# Patient Record
Sex: FEMALE | Race: BLACK OR AFRICAN AMERICAN | Employment: OTHER | URBAN - METROPOLITAN AREA
[De-identification: names, ages, dates, MRNs, and addresses within clinical notes are randomized per-mention and may not be internally consistent; named-entity substitution may affect disease eponyms.]

---

## 2019-03-13 ENCOUNTER — HOSPITAL ENCOUNTER (OUTPATIENT)
Age: 39
Discharge: HOME | End: 2019-03-15
Payer: COMMERCIAL

## 2019-03-13 ENCOUNTER — OFFICE VISIT (OUTPATIENT)
Dept: FAMILY MEDICINE CLINIC | Age: 39
End: 2019-03-13
Payer: COMMERCIAL

## 2019-03-13 VITALS
HEIGHT: 67 IN | HEART RATE: 74 BPM | BODY MASS INDEX: 24.48 KG/M2 | RESPIRATION RATE: 18 BRPM | TEMPERATURE: 97.8 F | SYSTOLIC BLOOD PRESSURE: 114 MMHG | DIASTOLIC BLOOD PRESSURE: 76 MMHG | OXYGEN SATURATION: 100 % | WEIGHT: 156 LBS

## 2019-03-13 DIAGNOSIS — H61.23 CERUMEN DEBRIS ON TYMPANIC MEMBRANE, BILATERAL: ICD-10-CM

## 2019-03-13 DIAGNOSIS — R53.83 FATIGUE, UNSPECIFIED TYPE: ICD-10-CM

## 2019-03-13 DIAGNOSIS — Z23 NEEDS FLU SHOT: ICD-10-CM

## 2019-03-13 DIAGNOSIS — M54.10 RADICULOPATHY WITH LOWER EXTREMITY SYMPTOMS: ICD-10-CM

## 2019-03-13 DIAGNOSIS — M54.10 RADICULOPATHY WITH LOWER EXTREMITY SYMPTOMS: Primary | ICD-10-CM

## 2019-03-13 PROCEDURE — 84443 ASSAY THYROID STIM HORMONE: CPT

## 2019-03-13 PROCEDURE — 90686 IIV4 VACC NO PRSV 0.5 ML IM: CPT

## 2019-03-13 PROCEDURE — 36415 COLL VENOUS BLD VENIPUNCTURE: CPT | Performed by: FAMILY MEDICINE

## 2019-03-13 PROCEDURE — G8427 DOCREV CUR MEDS BY ELIG CLIN: HCPCS | Performed by: FAMILY MEDICINE

## 2019-03-13 PROCEDURE — G8420 CALC BMI NORM PARAMETERS: HCPCS | Performed by: FAMILY MEDICINE

## 2019-03-13 PROCEDURE — 80048 BASIC METABOLIC PNL TOTAL CA: CPT

## 2019-03-13 PROCEDURE — G0008 ADMIN INFLUENZA VIRUS VAC: HCPCS

## 2019-03-13 PROCEDURE — 82607 VITAMIN B-12: CPT

## 2019-03-13 PROCEDURE — 6360000002 HC RX W HCPCS

## 2019-03-13 PROCEDURE — 1036F TOBACCO NON-USER: CPT | Performed by: FAMILY MEDICINE

## 2019-03-13 PROCEDURE — 82746 ASSAY OF FOLIC ACID SERUM: CPT

## 2019-03-13 PROCEDURE — 99212 OFFICE O/P EST SF 10 MIN: CPT | Performed by: STUDENT IN AN ORGANIZED HEALTH CARE EDUCATION/TRAINING PROGRAM

## 2019-03-13 PROCEDURE — 99213 OFFICE O/P EST LOW 20 MIN: CPT | Performed by: STUDENT IN AN ORGANIZED HEALTH CARE EDUCATION/TRAINING PROGRAM

## 2019-03-13 PROCEDURE — G8482 FLU IMMUNIZE ORDER/ADMIN: HCPCS | Performed by: FAMILY MEDICINE

## 2019-03-13 ASSESSMENT — PATIENT HEALTH QUESTIONNAIRE - PHQ9
SUM OF ALL RESPONSES TO PHQ QUESTIONS 1-9: 2
SUM OF ALL RESPONSES TO PHQ9 QUESTIONS 1 & 2: 2
2. FEELING DOWN, DEPRESSED OR HOPELESS: 1
SUM OF ALL RESPONSES TO PHQ QUESTIONS 1-9: 2
1. LITTLE INTEREST OR PLEASURE IN DOING THINGS: 1

## 2019-03-14 LAB
ANION GAP SERPL CALCULATED.3IONS-SCNC: 13 MMOL/L (ref 7–16)
BUN BLDV-MCNC: 9 MG/DL (ref 6–20)
CALCIUM SERPL-MCNC: 9.3 MG/DL (ref 8.6–10.2)
CHLORIDE BLD-SCNC: 102 MMOL/L (ref 98–107)
CO2: 26 MMOL/L (ref 22–29)
CREAT SERPL-MCNC: 0.8 MG/DL (ref 0.5–1)
FOLATE: 17.1 NG/ML (ref 4.8–24.2)
GFR AFRICAN AMERICAN: >60
GFR NON-AFRICAN AMERICAN: >60 ML/MIN/1.73
GLUCOSE BLD-MCNC: 77 MG/DL (ref 74–99)
POTASSIUM SERPL-SCNC: 4.8 MMOL/L (ref 3.5–5)
SODIUM BLD-SCNC: 141 MMOL/L (ref 132–146)
TSH SERPL DL<=0.05 MIU/L-ACNC: 1.26 UIU/ML (ref 0.27–4.2)
VITAMIN B-12: 399 PG/ML (ref 211–946)

## 2019-03-14 PROCEDURE — G0008 ADMIN INFLUENZA VIRUS VAC: HCPCS

## 2019-03-17 ENCOUNTER — HOSPITAL ENCOUNTER (OUTPATIENT)
Age: 39
Discharge: HOME | End: 2019-03-19
Payer: COMMERCIAL

## 2019-03-17 ENCOUNTER — HOSPITAL ENCOUNTER (OUTPATIENT)
Dept: GENERAL RADIOLOGY | Age: 39
Discharge: HOME | End: 2019-03-19
Payer: COMMERCIAL

## 2019-03-17 DIAGNOSIS — M54.10 RADICULOPATHY WITH LOWER EXTREMITY SYMPTOMS: ICD-10-CM

## 2019-03-17 PROCEDURE — 72100 X-RAY EXAM L-S SPINE 2/3 VWS: CPT

## 2019-03-21 ASSESSMENT — ENCOUNTER SYMPTOMS
BACK PAIN: 1
NAUSEA: 0
CONSTIPATION: 0
ABDOMINAL PAIN: 0

## 2019-03-22 ENCOUNTER — OFFICE VISIT (OUTPATIENT)
Dept: FAMILY MEDICINE CLINIC | Age: 39
End: 2019-03-22
Payer: COMMERCIAL

## 2019-03-22 ENCOUNTER — HOSPITAL ENCOUNTER (OUTPATIENT)
Age: 39
Discharge: HOME | End: 2019-03-24
Payer: COMMERCIAL

## 2019-03-22 VITALS
TEMPERATURE: 98 F | OXYGEN SATURATION: 100 % | BODY MASS INDEX: 24.34 KG/M2 | DIASTOLIC BLOOD PRESSURE: 70 MMHG | HEART RATE: 86 BPM | SYSTOLIC BLOOD PRESSURE: 104 MMHG | WEIGHT: 155.1 LBS | HEIGHT: 67 IN

## 2019-03-22 DIAGNOSIS — R20.0 SADDLE ANESTHESIA: ICD-10-CM

## 2019-03-22 DIAGNOSIS — G62.9 NEUROPATHY: Primary | ICD-10-CM

## 2019-03-22 DIAGNOSIS — H53.2 DOUBLE VISION: ICD-10-CM

## 2019-03-22 DIAGNOSIS — G62.9 NEUROPATHY: ICD-10-CM

## 2019-03-22 LAB
BASOPHILS ABSOLUTE: 0.07 E9/L (ref 0–0.2)
BASOPHILS RELATIVE PERCENT: 0.6 % (ref 0–2)
EOSINOPHILS ABSOLUTE: 0.09 E9/L (ref 0.05–0.5)
EOSINOPHILS RELATIVE PERCENT: 0.7 % (ref 0–6)
HCT VFR BLD CALC: 43.3 % (ref 34–48)
HEMOGLOBIN: 14.1 G/DL (ref 11.5–15.5)
IMMATURE GRANULOCYTES #: 0.03 E9/L
IMMATURE GRANULOCYTES %: 0.2 % (ref 0–5)
LYMPHOCYTES ABSOLUTE: 1.07 E9/L (ref 1.5–4)
LYMPHOCYTES RELATIVE PERCENT: 8.9 % (ref 20–42)
MCH RBC QN AUTO: 32.5 PG (ref 26–35)
MCHC RBC AUTO-ENTMCNC: 32.6 % (ref 32–34.5)
MCV RBC AUTO: 99.8 FL (ref 80–99.9)
MONOCYTES ABSOLUTE: 0.69 E9/L (ref 0.1–0.95)
MONOCYTES RELATIVE PERCENT: 5.7 % (ref 2–12)
NEUTROPHILS ABSOLUTE: 10.13 E9/L (ref 1.8–7.3)
NEUTROPHILS RELATIVE PERCENT: 83.9 % (ref 43–80)
PDW BLD-RTO: 12.8 FL (ref 11.5–15)
PLATELET # BLD: 407 E9/L (ref 130–450)
PMV BLD AUTO: 9.4 FL (ref 7–12)
RBC # BLD: 4.34 E12/L (ref 3.5–5.5)
WBC # BLD: 12.1 E9/L (ref 4.5–11.5)

## 2019-03-22 PROCEDURE — G8420 CALC BMI NORM PARAMETERS: HCPCS | Performed by: STUDENT IN AN ORGANIZED HEALTH CARE EDUCATION/TRAINING PROGRAM

## 2019-03-22 PROCEDURE — G8482 FLU IMMUNIZE ORDER/ADMIN: HCPCS | Performed by: STUDENT IN AN ORGANIZED HEALTH CARE EDUCATION/TRAINING PROGRAM

## 2019-03-22 PROCEDURE — 99212 OFFICE O/P EST SF 10 MIN: CPT | Performed by: STUDENT IN AN ORGANIZED HEALTH CARE EDUCATION/TRAINING PROGRAM

## 2019-03-22 PROCEDURE — G8427 DOCREV CUR MEDS BY ELIG CLIN: HCPCS | Performed by: STUDENT IN AN ORGANIZED HEALTH CARE EDUCATION/TRAINING PROGRAM

## 2019-03-22 PROCEDURE — 1036F TOBACCO NON-USER: CPT | Performed by: STUDENT IN AN ORGANIZED HEALTH CARE EDUCATION/TRAINING PROGRAM

## 2019-03-22 PROCEDURE — 36415 COLL VENOUS BLD VENIPUNCTURE: CPT | Performed by: FAMILY MEDICINE

## 2019-03-22 PROCEDURE — 99213 OFFICE O/P EST LOW 20 MIN: CPT | Performed by: STUDENT IN AN ORGANIZED HEALTH CARE EDUCATION/TRAINING PROGRAM

## 2019-03-22 PROCEDURE — 85025 COMPLETE CBC W/AUTO DIFF WBC: CPT

## 2019-03-25 ASSESSMENT — ENCOUNTER SYMPTOMS
DIARRHEA: 0
EYE ITCHING: 0
VOMITING: 0
COUGH: 0
RHINORRHEA: 0
NAUSEA: 0
WHEEZING: 0
SHORTNESS OF BREATH: 0
EYE DISCHARGE: 0
CONSTIPATION: 0
ABDOMINAL PAIN: 0
ABDOMINAL DISTENTION: 0
COLOR CHANGE: 0
SORE THROAT: 0
BACK PAIN: 0
EYE REDNESS: 0

## 2019-03-26 ENCOUNTER — TELEPHONE (OUTPATIENT)
Dept: FAMILY MEDICINE CLINIC | Age: 39
End: 2019-03-26

## 2019-04-08 ENCOUNTER — TELEPHONE (OUTPATIENT)
Dept: FAMILY MEDICINE CLINIC | Age: 39
End: 2019-04-08

## 2019-04-08 DIAGNOSIS — H53.2 DOUBLE VISION: ICD-10-CM

## 2019-04-08 DIAGNOSIS — R20.0 SADDLE ANESTHESIA: ICD-10-CM

## 2019-04-08 DIAGNOSIS — G62.9 NEUROPATHY: Primary | ICD-10-CM

## 2019-04-08 NOTE — TELEPHONE ENCOUNTER
Pt has MRI brain tomorrow  It needs ordered as MRI brain with and without contrast  Can a new order be placed   The patient goes tomorrow

## 2019-04-09 ENCOUNTER — HOSPITAL ENCOUNTER (OUTPATIENT)
Dept: MRI IMAGING | Age: 39
Discharge: HOME | End: 2019-04-11
Payer: COMMERCIAL

## 2019-04-09 DIAGNOSIS — R20.0 SADDLE ANESTHESIA: ICD-10-CM

## 2019-04-09 DIAGNOSIS — H53.2 DOUBLE VISION: ICD-10-CM

## 2019-04-09 DIAGNOSIS — G62.9 NEUROPATHY: ICD-10-CM

## 2019-04-09 PROCEDURE — 72157 MRI CHEST SPINE W/O & W/DYE: CPT

## 2019-04-09 PROCEDURE — 70553 MRI BRAIN STEM W/O & W/DYE: CPT

## 2019-04-09 PROCEDURE — 72158 MRI LUMBAR SPINE W/O & W/DYE: CPT

## 2019-04-09 PROCEDURE — A9579 GAD-BASE MR CONTRAST NOS,1ML: HCPCS | Performed by: RADIOLOGY

## 2019-04-09 PROCEDURE — 72156 MRI NECK SPINE W/O & W/DYE: CPT

## 2019-04-09 PROCEDURE — 6360000004 HC RX CONTRAST MEDICATION: Performed by: RADIOLOGY

## 2019-04-09 RX ADMIN — GADOTERIDOL 20 ML: 279.3 INJECTION, SOLUTION INTRAVENOUS at 09:32

## 2019-04-14 DIAGNOSIS — H53.2 DOUBLE VISION: ICD-10-CM

## 2019-04-14 DIAGNOSIS — R93.89 ABNORMAL MRI: Primary | ICD-10-CM

## 2019-04-14 DIAGNOSIS — M54.10 RADICULOPATHY WITH LOWER EXTREMITY SYMPTOMS: ICD-10-CM

## 2019-04-14 DIAGNOSIS — G62.9 NEUROPATHY: ICD-10-CM

## 2019-04-14 DIAGNOSIS — R20.0 SADDLE ANESTHESIA: ICD-10-CM

## 2019-04-14 NOTE — RESULT ENCOUNTER NOTE
Please call patient and let her know we are referring her to Neurology.      Electronically signed by Odalis Doan MD on 4/14/2019 at 8:56 AM

## 2019-04-23 ENCOUNTER — TELEPHONE (OUTPATIENT)
Dept: FAMILY MEDICINE CLINIC | Age: 39
End: 2019-04-23

## 2019-04-25 ENCOUNTER — TELEPHONE (OUTPATIENT)
Dept: FAMILY MEDICINE CLINIC | Age: 39
End: 2019-04-25

## 2019-04-26 ENCOUNTER — TELEPHONE (OUTPATIENT)
Dept: FAMILY MEDICINE CLINIC | Age: 39
End: 2019-04-26

## 2019-04-26 DIAGNOSIS — R93.89 ABNORMAL MRI: Primary | ICD-10-CM

## 2019-06-19 ENCOUNTER — HOSPITAL ENCOUNTER (OUTPATIENT)
Age: 39
Discharge: HOME | End: 2019-06-19
Payer: COMMERCIAL

## 2019-06-19 ENCOUNTER — OFFICE VISIT (OUTPATIENT)
Dept: NEUROLOGY | Age: 39
End: 2019-06-19
Payer: COMMERCIAL

## 2019-06-19 VITALS
HEIGHT: 67 IN | OXYGEN SATURATION: 99 % | RESPIRATION RATE: 17 BRPM | HEART RATE: 67 BPM | DIASTOLIC BLOOD PRESSURE: 64 MMHG | BODY MASS INDEX: 26.37 KG/M2 | SYSTOLIC BLOOD PRESSURE: 105 MMHG | WEIGHT: 168 LBS

## 2019-06-19 VITALS
SYSTOLIC BLOOD PRESSURE: 105 MMHG | BODY MASS INDEX: 26.37 KG/M2 | WEIGHT: 168 LBS | HEIGHT: 67 IN | DIASTOLIC BLOOD PRESSURE: 64 MMHG | HEART RATE: 67 BPM

## 2019-06-19 DIAGNOSIS — M21.372 BILATERAL FOOT-DROP: ICD-10-CM

## 2019-06-19 DIAGNOSIS — G35 MULTIPLE SCLEROSIS (HCC): ICD-10-CM

## 2019-06-19 DIAGNOSIS — R25.2 SPASTICITY: ICD-10-CM

## 2019-06-19 DIAGNOSIS — G35 MULTIPLE SCLEROSIS (HCC): Primary | ICD-10-CM

## 2019-06-19 DIAGNOSIS — M21.371 BILATERAL FOOT-DROP: ICD-10-CM

## 2019-06-19 LAB
ALBUMIN SERPL-MCNC: 4.7 G/DL (ref 3.5–5.2)
ALP BLD-CCNC: 48 U/L (ref 35–104)
ALT SERPL-CCNC: 12 U/L (ref 0–32)
ANION GAP SERPL CALCULATED.3IONS-SCNC: 14 MMOL/L (ref 7–16)
AST SERPL-CCNC: 17 U/L (ref 0–31)
BASOPHILS ABSOLUTE: 0.07 E9/L (ref 0–0.2)
BASOPHILS RELATIVE PERCENT: 0.9 % (ref 0–2)
BILIRUB SERPL-MCNC: 0.2 MG/DL (ref 0–1.2)
BUN BLDV-MCNC: 11 MG/DL (ref 6–20)
CALCIUM SERPL-MCNC: 9.3 MG/DL (ref 8.6–10.2)
CHLORIDE BLD-SCNC: 103 MMOL/L (ref 98–107)
CO2: 23 MMOL/L (ref 22–29)
CREAT SERPL-MCNC: 0.7 MG/DL (ref 0.5–1)
EOSINOPHILS ABSOLUTE: 0.13 E9/L (ref 0.05–0.5)
EOSINOPHILS RELATIVE PERCENT: 1.6 % (ref 0–6)
GFR AFRICAN AMERICAN: >60
GFR NON-AFRICAN AMERICAN: >60 ML/MIN/1.73
GLUCOSE BLD-MCNC: 90 MG/DL (ref 74–99)
HCT VFR BLD CALC: 40.2 % (ref 34–48)
HEMOGLOBIN: 13.3 G/DL (ref 11.5–15.5)
IMMATURE GRANULOCYTES #: 0.04 E9/L
IMMATURE GRANULOCYTES %: 0.5 % (ref 0–5)
LYMPHOCYTES ABSOLUTE: 1.8 E9/L (ref 1.5–4)
LYMPHOCYTES RELATIVE PERCENT: 22.3 % (ref 20–42)
MCH RBC QN AUTO: 32.7 PG (ref 26–35)
MCHC RBC AUTO-ENTMCNC: 33.1 % (ref 32–34.5)
MCV RBC AUTO: 98.8 FL (ref 80–99.9)
MONOCYTES ABSOLUTE: 0.74 E9/L (ref 0.1–0.95)
MONOCYTES RELATIVE PERCENT: 9.2 % (ref 2–12)
NEUTROPHILS ABSOLUTE: 5.3 E9/L (ref 1.8–7.3)
NEUTROPHILS RELATIVE PERCENT: 65.5 % (ref 43–80)
PDW BLD-RTO: 12.6 FL (ref 11.5–15)
PLATELET # BLD: 385 E9/L (ref 130–450)
PMV BLD AUTO: 9.2 FL (ref 7–12)
POTASSIUM SERPL-SCNC: 4.1 MMOL/L (ref 3.5–5)
RBC # BLD: 4.07 E12/L (ref 3.5–5.5)
SEDIMENTATION RATE, ERYTHROCYTE: 29 MM/HR (ref 0–20)
SODIUM BLD-SCNC: 140 MMOL/L (ref 132–146)
TOTAL PROTEIN: 7.7 G/DL (ref 6.4–8.3)
WBC # BLD: 8.1 E9/L (ref 4.5–11.5)

## 2019-06-19 PROCEDURE — 85651 RBC SED RATE NONAUTOMATED: CPT

## 2019-06-19 PROCEDURE — 36415 COLL VENOUS BLD VENIPUNCTURE: CPT

## 2019-06-19 PROCEDURE — 86038 ANTINUCLEAR ANTIBODIES: CPT

## 2019-06-19 PROCEDURE — G8419 CALC BMI OUT NRM PARAM NOF/U: HCPCS | Performed by: PHYSICAL MEDICINE & REHABILITATION

## 2019-06-19 PROCEDURE — 99205 OFFICE O/P NEW HI 60 MIN: CPT | Performed by: PSYCHIATRY & NEUROLOGY

## 2019-06-19 PROCEDURE — 99204 OFFICE O/P NEW MOD 45 MIN: CPT | Performed by: PHYSICAL MEDICINE & REHABILITATION

## 2019-06-19 PROCEDURE — G8427 DOCREV CUR MEDS BY ELIG CLIN: HCPCS | Performed by: PSYCHIATRY & NEUROLOGY

## 2019-06-19 PROCEDURE — 80074 ACUTE HEPATITIS PANEL: CPT

## 2019-06-19 PROCEDURE — 1036F TOBACCO NON-USER: CPT | Performed by: PHYSICAL MEDICINE & REHABILITATION

## 2019-06-19 PROCEDURE — 86703 HIV-1/HIV-2 1 RESULT ANTBDY: CPT

## 2019-06-19 PROCEDURE — G8419 CALC BMI OUT NRM PARAM NOF/U: HCPCS | Performed by: PSYCHIATRY & NEUROLOGY

## 2019-06-19 PROCEDURE — 85025 COMPLETE CBC W/AUTO DIFF WBC: CPT

## 2019-06-19 PROCEDURE — 80053 COMPREHEN METABOLIC PANEL: CPT

## 2019-06-19 PROCEDURE — 86592 SYPHILIS TEST NON-TREP QUAL: CPT

## 2019-06-19 PROCEDURE — G8427 DOCREV CUR MEDS BY ELIG CLIN: HCPCS | Performed by: PHYSICAL MEDICINE & REHABILITATION

## 2019-06-19 PROCEDURE — 99212 OFFICE O/P EST SF 10 MIN: CPT | Performed by: PSYCHIATRY & NEUROLOGY

## 2019-06-19 PROCEDURE — 86039 ANTINUCLEAR ANTIBODIES (ANA): CPT

## 2019-06-19 PROCEDURE — 86235 NUCLEAR ANTIGEN ANTIBODY: CPT

## 2019-06-19 RX ORDER — BACLOFEN 10 MG/1
TABLET ORAL
Qty: 60 TABLET | Refills: 3 | Status: SHIPPED | OUTPATIENT
Start: 2019-06-19 | End: 2019-10-16 | Stop reason: ALTCHOICE

## 2019-06-19 NOTE — PROGRESS NOTES
Diabetes Maternal Grandfather     Cancer Maternal Grandfather         lymphoma     Hypertension Brother     High Cholesterol Brother     Cancer Maternal Aunt         breast cancer diagnosed 43yo     No reported family history of MS    Social History:  Social History     Socioeconomic History    Marital status: Single     Spouse name: Not on file    Number of children: Not on file    Years of education: Not on file    Highest education level: Not on file   Occupational History    Occupation: unemployed   Social Needs    Financial resource strain: Not on file    Food insecurity:     Worry: Not on file     Inability: Not on file   Viadeo needs:     Medical: Not on file     Non-medical: Not on file   Tobacco Use    Smoking status: Never Smoker    Smokeless tobacco: Never Used   Substance and Sexual Activity    Alcohol use: No    Drug use: No    Sexual activity: Not Currently   Lifestyle    Physical activity:     Days per week: Not on file     Minutes per session: Not on file    Stress: Not on file   Relationships    Social connections:     Talks on phone: Not on file     Gets together: Not on file     Attends Anabaptist service: Not on file     Active member of club or organization: Not on file     Attends meetings of clubs or organizations: Not on file     Relationship status: Not on file    Intimate partner violence:     Fear of current or ex partner: Not on file     Emotionally abused: Not on file     Physically abused: Not on file     Forced sexual activity: Not on file   Other Topics Concern    Not on file   Social History Narrative    Not on file     Work history: Worked at [employer] until 2017, no longer working      FUNCTIONAL STATUS:   Patient is independent with mobility/ambulation, transfers, ADL's, IADL's.     Review of Systems:    Constitutional: Denies fevers, chills, night sweats, unintentional weight loss     Skin: Denies rash or skin changes     Eyes: Denies vision

## 2019-06-19 NOTE — PROGRESS NOTES
[facility]. Albino Waite M.D., F.A.C.P. Bentley Whitten, OLEG, APRN, CNS  Taylor Powers. Anastacio Heath, MSN, APRN-FNP-C  Benuel Angelucci MSN, APRN, FNP-C  Mariusz ADAN PA-C  [address] MSN, APRN, FNP-C  [address], [address] [place]   Phone: [phone]  Fax: [phone]      Ashley Blair is a 46 y.o. right handed woman who was referred for additional evaluation and management of recently diagnosed multiple sclerosis. The patient was a poor historian due to congenital cognitive deficits; her mother was a fair historian. Her medications included only Flonase. Her past medical history was not remarkable for hypertension, diabetes mellitus, strokes, seizures, heart disease, lung disorders, connective tissue disease, gastrointestinal issues, cancers, skin abnormalities or depression. She was reportedly treated for sexually transmitted diseases years ago---of uncertain etiology. Her mother also reported a right heel spur. Her only surgeries include a repair of the right heel spur. She had no known allergies and suffered no trauma. She is a native of the [facility]. She always lived with her mother. She was single with no children. She never smoked nor drank. She never abused illicit drugs. She previously worked in a sheltered setting. She attended special classes in high school. There were 4 siblings who are healthy. Her family history was remarkable for hypertension, hyperlipidemia, diabetes mellitus, lymphoma and breast cancer. .    Review of systems was otherwise unremarkable, except as noted below. She was referred to this clinic after recently being diagnosed with multiple sclerosis. Her mother noted she always walks slowly. However, she reported more difficulties with her gait over the past 6 months. Her legs felt numb 6 months ago and clumsier.   She also reported numbness around her buttocks and perineum. She denied any incontinence. The patient reported no involvement of her arms. She also denied more speech difficulties, headaches, visual problems, hearing loss, spinning sensations, swallowing or chewing abnormalities or loss of consciousness. She was the product of a normal pregnancy and delivery. However, she reached all her milestones late. She did not walk until she was 4years old. She began speaking afterwards. She was always slow and unable to keep up with her siblings and peers. She attended special classes throughout her life. There were never seizures. The patient and her mother denied other neurological or medical issues. Objective:     /64 (Site: Left Upper Arm, Position: Sitting, Cuff Size: Medium Adult)   Pulse 67   Resp 17   Ht 5' 7\" (1.702 m)   Wt 168 lb (76.2 kg)   SpO2 99%   BMI 26.31 kg/m²     General appearance: alert, appears stated age, cooperative and in no distress  Head: normocephalic--- with no macro or microcephaly, without obvious abnormality, atraumatic  Eyes: conjunctivae/corneas clear; no drainage  Neck: no adenopathy, no carotid bruit, supple, symmetrical, trachea midline, no tenderness/mass/nodules  Back: symmetric, no curvature.  ROM normal.   Lungs: clear to auscultation  Heart: regular rate and rhythm, S1, S2 normal, no murmur  Abdomen: soft, non-tender; bowel sounds normal; no masses,  no organomegaly  Extremities: normal, atraumatic, no cyanosis or edema  Pulses: 2+ and symmetric  Skin:  color, texture, turgor normal-----no rashes or lesions    Mental Status: alert and oriented x 3; she displayed fair insight into her disorder  She possessed a slight spastic, ataxic dysarthria  There were no aphasias but her speech was limited; with slow thinking    Cranial Nerves:  I: smell    II: visual acuity     II: visual fields Full to confrontation    II: pupils PERRL   III,VII: ptosis None   III,IV,VI: extraocular muscles  Left beating nystagmus with primary gaze   V: mastication Normal   V: facial light touch sensation  Normal   V,VII: corneal reflex     VII: facial muscle function - upper  Normal   VII: facial muscle function - lower Normal   VIII: hearing Normal   IX: soft palate elevation  Normal   IX,X: gag reflex    XI: trapezius strength  5/5   XI: sternocleidomastoid strength 5/5   XI: neck extension strength  5/5   XII: tongue strength  Normal     Motor:  5/5 in both arms  Moderate spasticity in both legs, with 5/5 strength as well  Normal bulk with no drift or abnormal movements. Sensory:   All sensory modalities were intact    Coordination:   Finger-to-nose testing was performed well; heel-to-shin was performed slowly but without lesions  Rapid alternating movements and fine finger movements are slightly decreased in both hands    Gait:  She displayed a spastic paraparetic gait with difficulties flexing her hip and with bilateral foot drop  Romberg's was unremarkable    DTR:   +2 in the arms and +3 in the legs  I found no pathological reflexes      Laboratory/Radiology:  ry/Radiology:     CBC:   Lab Results   Component Value Date    WBC 8.1 06/19/2019    RBC 4.07 06/19/2019    HGB 13.3 06/19/2019    HCT 40.2 06/19/2019    MCV 98.8 06/19/2019    MCH 32.7 06/19/2019    MCHC 33.1 06/19/2019    RDW 12.6 06/19/2019     06/19/2019    MPV 9.2 06/19/2019     CMP:    Lab Results   Component Value Date     03/13/2019    K 4.8 03/13/2019     03/13/2019    CO2 26 03/13/2019    BUN 9 03/13/2019    CREATININE 0.8 03/13/2019    GFRAA >60 03/13/2019    LABGLOM >60 03/13/2019    GLUCOSE 77 03/13/2019    CALCIUM 9.3 03/13/2019     TSH:    Lab Results   Component Value Date    TSH 1.260 03/13/2019   FOLATE:    Lab Results   Component Value Date    FOLATE 17.1 03/13/2019     MRI Brain with/without contrast 4/9/19 revealed moderate white matter disease in the periventricular regions and gray-white matter junctions, with no active lesions    MRI of her C-spine revealed abnormal signal in the upper cord without active disease    MRIs of her lumbosacral spine and thoracic spine displayed demyelinating lesions as well    Assessment: This individual presents with a spastic paraparetic gait--- 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 Fortunately, she has had no seizures. Unfortunately, her gait has worsened due to multiple sclerosis. She requires aggressive disease modifying therapy---either oral agents or Ocrevus. She also requires antispasmodics, gait evaluation and bilateral AFO bracing. She is stable medically. Plan:     Routine laboratory data as well as HIV, MARGE, sedimentation rate, RPR, HUGO virus assay and Sjogren's studies were ordered    Visual evoked potentials were also ordered    She and her mother will decide concerning therapies soon    Rehabilitation will follow closely    She will return in 3 to 4 months    I spent 80 minutes with the patient with over 50 % spent in counseling and disease management. All patient issues were addressed and all questions were answered.

## 2019-06-20 LAB
HAV IGM SER IA-ACNC: NORMAL
HEPATITIS B CORE IGM ANTIBODY: NORMAL
HEPATITIS B SURFACE ANTIGEN INTERPRETATION: NORMAL
HEPATITIS C ANTIBODY INTERPRETATION: NORMAL
HIV-1 AND HIV-2 ANTIBODIES: NORMAL
RPR: NORMAL

## 2019-06-21 LAB
ANA PATTERN: ABNORMAL
ANA TITER: ABNORMAL
ANTI-NUCLEAR ANTIBODY (ANA): POSITIVE
ENA TO SSA (RO) ANTIBODY: NEGATIVE
ENA TO SSB (LA) ANTIBODY: NEGATIVE

## 2019-06-25 ENCOUNTER — HOSPITAL ENCOUNTER (OUTPATIENT)
Dept: AUDIOLOGY | Age: 39
Discharge: HOME | End: 2019-06-25
Payer: COMMERCIAL

## 2019-06-25 PROCEDURE — 95930 VISUAL EP TEST CNS W/I&R: CPT | Performed by: PSYCHIATRY & NEUROLOGY

## 2019-06-25 PROCEDURE — 95930 VISUAL EP TEST CNS W/I&R: CPT | Performed by: AUDIOLOGIST

## 2019-07-09 DIAGNOSIS — G35 MULTIPLE SCLEROSIS (HCC): Primary | ICD-10-CM

## 2019-07-11 ENCOUNTER — HOSPITAL ENCOUNTER (OUTPATIENT)
Age: 39
Discharge: HOME | End: 2019-07-11
Payer: COMMERCIAL

## 2019-07-11 DIAGNOSIS — G35 MULTIPLE SCLEROSIS (HCC): ICD-10-CM

## 2019-07-11 PROCEDURE — 87798 DETECT AGENT NOS DNA AMP: CPT

## 2019-07-22 LAB
Lab: NORMAL
REPORT: NORMAL
THIS TEST SENT TO: NORMAL

## 2019-08-01 ENCOUNTER — TELEPHONE (OUTPATIENT)
Dept: NEUROLOGY | Age: 39
End: 2019-08-01

## 2019-08-15 NOTE — TELEPHONE ENCOUNTER
Pts mother, Fredy [address], called questioning/ confirming the Tecfidera dosing instructions she was given by Augie Mackay MA:  Starting Sunday 8/4: For 7 days take 1 120mg po BID with food  8/11: For 7 days Take 2  240mg po at dinner  8/18: For 7 days Take 1 120mg po with lunch  8/25: For 7 days Take 2  240mg po with dinner  Continuing forward Take 1 240mg BID    Tory was very frantic and upset, yelling that she was out of the 120mg tablets and asking if her daughter was going to die because she has been calling the office for 4 days and no one has returned her calls. She forgot to give the medication for 2 days and she doesn't know what to do now. She is very confused on the instructions given to her and is concerned her daughter is going to get sick. After calming patient's mother down. .  CMA read the instructions from the Tecfidera start form scanned into the  in [address] (There were no other order/instructions documented)  Week 1 For 7 days take 120mg po BID  Week 2-4 take 240mg po BID    CMA informed Fredy [address] that she would clarify the instructions with Dr Nicolas Mercado and call her back. Fredy [address] can be reached at [phone]. Message forwarded to Dr Nicolas Mercado and Augie Mackay MA for further instruction.     Electronically signed by Merari Tellez on 8/15/19 at 12:12 PM

## 2019-08-15 NOTE — TELEPHONE ENCOUNTER
Tecfidera Alternative start dosing was:     Week 1: 120 mg at dinner for 7 days  Week 2: 240 mg at dinner for 7 days  Week 3: 120 mg at at lunch and 240 mg at dinner for 7 days  Week 4: 240 mg at lunch and 240 mg at dinner for every day after    Patient's mother was given these instructions multiple times due to her being worried about patient starting tecfidera full on and getting sick.

## 2019-10-16 ENCOUNTER — OFFICE VISIT (OUTPATIENT)
Dept: NEUROLOGY | Age: 39
End: 2019-10-16
Payer: COMMERCIAL

## 2019-10-16 VITALS
HEIGHT: 67 IN | HEART RATE: 101 BPM | BODY MASS INDEX: 24.5 KG/M2 | SYSTOLIC BLOOD PRESSURE: 132 MMHG | WEIGHT: 156.1 LBS | DIASTOLIC BLOOD PRESSURE: 61 MMHG | OXYGEN SATURATION: 99 %

## 2019-10-16 VITALS
RESPIRATION RATE: 16 BRPM | HEIGHT: 67 IN | HEART RATE: 101 BPM | SYSTOLIC BLOOD PRESSURE: 132 MMHG | DIASTOLIC BLOOD PRESSURE: 61 MMHG | OXYGEN SATURATION: 99 % | BODY MASS INDEX: 24.48 KG/M2 | WEIGHT: 156 LBS

## 2019-10-16 DIAGNOSIS — R25.2 SPASTICITY: ICD-10-CM

## 2019-10-16 DIAGNOSIS — M21.371 BILATERAL FOOT-DROP: ICD-10-CM

## 2019-10-16 DIAGNOSIS — M21.372 BILATERAL FOOT-DROP: ICD-10-CM

## 2019-10-16 DIAGNOSIS — G35 MULTIPLE SCLEROSIS (HCC): Primary | ICD-10-CM

## 2019-10-16 PROCEDURE — G8427 DOCREV CUR MEDS BY ELIG CLIN: HCPCS | Performed by: PHYSICAL MEDICINE & REHABILITATION

## 2019-10-16 PROCEDURE — 99213 OFFICE O/P EST LOW 20 MIN: CPT | Performed by: PHYSICAL MEDICINE & REHABILITATION

## 2019-10-16 PROCEDURE — G8482 FLU IMMUNIZE ORDER/ADMIN: HCPCS | Performed by: PHYSICAL MEDICINE & REHABILITATION

## 2019-10-16 PROCEDURE — 1036F TOBACCO NON-USER: CPT | Performed by: PHYSICAL MEDICINE & REHABILITATION

## 2019-10-16 PROCEDURE — G8420 CALC BMI NORM PARAMETERS: HCPCS | Performed by: PHYSICAL MEDICINE & REHABILITATION

## 2019-10-16 PROCEDURE — G8427 DOCREV CUR MEDS BY ELIG CLIN: HCPCS | Performed by: PSYCHIATRY & NEUROLOGY

## 2019-10-16 PROCEDURE — G8420 CALC BMI NORM PARAMETERS: HCPCS | Performed by: PSYCHIATRY & NEUROLOGY

## 2019-10-16 PROCEDURE — G8484 FLU IMMUNIZE NO ADMIN: HCPCS | Performed by: PSYCHIATRY & NEUROLOGY

## 2019-10-16 PROCEDURE — 99215 OFFICE O/P EST HI 40 MIN: CPT | Performed by: PSYCHIATRY & NEUROLOGY

## 2019-10-16 PROCEDURE — 1036F TOBACCO NON-USER: CPT | Performed by: PSYCHIATRY & NEUROLOGY

## 2019-11-01 ENCOUNTER — HOSPITAL ENCOUNTER (OUTPATIENT)
Age: 39
Discharge: HOME | End: 2019-11-03
Payer: COMMERCIAL

## 2019-11-01 ENCOUNTER — OFFICE VISIT (OUTPATIENT)
Dept: FAMILY MEDICINE CLINIC | Age: 39
End: 2019-11-01
Payer: COMMERCIAL

## 2019-11-01 VITALS
DIASTOLIC BLOOD PRESSURE: 60 MMHG | BODY MASS INDEX: 24.01 KG/M2 | OXYGEN SATURATION: 99 % | HEIGHT: 67 IN | HEART RATE: 75 BPM | WEIGHT: 153 LBS | SYSTOLIC BLOOD PRESSURE: 101 MMHG | RESPIRATION RATE: 18 BRPM | TEMPERATURE: 98.2 F

## 2019-11-01 DIAGNOSIS — T50.905A ADVERSE EFFECT OF DRUG, INITIAL ENCOUNTER: Primary | ICD-10-CM

## 2019-11-01 DIAGNOSIS — G35 MULTIPLE SCLEROSIS (HCC): ICD-10-CM

## 2019-11-01 DIAGNOSIS — Z23 NEED FOR INFLUENZA VACCINATION: ICD-10-CM

## 2019-11-01 LAB
ALBUMIN SERPL-MCNC: 4.5 G/DL (ref 3.5–5.2)
ALP BLD-CCNC: 54 U/L (ref 35–104)
ALT SERPL-CCNC: 16 U/L (ref 0–32)
ANION GAP SERPL CALCULATED.3IONS-SCNC: 17 MMOL/L (ref 7–16)
AST SERPL-CCNC: 22 U/L (ref 0–31)
BASOPHILS ABSOLUTE: 0.04 E9/L (ref 0–0.2)
BASOPHILS RELATIVE PERCENT: 0.6 % (ref 0–2)
BILIRUB SERPL-MCNC: 0.3 MG/DL (ref 0–1.2)
BUN BLDV-MCNC: 13 MG/DL (ref 6–20)
CALCIUM SERPL-MCNC: 9.4 MG/DL (ref 8.6–10.2)
CHLORIDE BLD-SCNC: 102 MMOL/L (ref 98–107)
CO2: 22 MMOL/L (ref 22–29)
CREAT SERPL-MCNC: 0.7 MG/DL (ref 0.5–1)
EOSINOPHILS ABSOLUTE: 0.05 E9/L (ref 0.05–0.5)
EOSINOPHILS RELATIVE PERCENT: 0.7 % (ref 0–6)
GFR AFRICAN AMERICAN: >60
GFR NON-AFRICAN AMERICAN: >60 ML/MIN/1.73
GLUCOSE BLD-MCNC: 74 MG/DL (ref 74–99)
HCT VFR BLD CALC: 42 % (ref 34–48)
HEMOGLOBIN: 13.1 G/DL (ref 11.5–15.5)
IMMATURE GRANULOCYTES #: 0.02 E9/L
IMMATURE GRANULOCYTES %: 0.3 % (ref 0–5)
LYMPHOCYTES ABSOLUTE: 0.94 E9/L (ref 1.5–4)
LYMPHOCYTES RELATIVE PERCENT: 13.1 % (ref 20–42)
MCH RBC QN AUTO: 31.5 PG (ref 26–35)
MCHC RBC AUTO-ENTMCNC: 31.2 % (ref 32–34.5)
MCV RBC AUTO: 101 FL (ref 80–99.9)
MONOCYTES ABSOLUTE: 0.58 E9/L (ref 0.1–0.95)
MONOCYTES RELATIVE PERCENT: 8.1 % (ref 2–12)
NEUTROPHILS ABSOLUTE: 5.57 E9/L (ref 1.8–7.3)
NEUTROPHILS RELATIVE PERCENT: 77.2 % (ref 43–80)
PDW BLD-RTO: 12.7 FL (ref 11.5–15)
PLATELET # BLD: 416 E9/L (ref 130–450)
PMV BLD AUTO: 9.5 FL (ref 7–12)
POTASSIUM SERPL-SCNC: 4.1 MMOL/L (ref 3.5–5)
RBC # BLD: 4.16 E12/L (ref 3.5–5.5)
SODIUM BLD-SCNC: 141 MMOL/L (ref 132–146)
TOTAL PROTEIN: 7.6 G/DL (ref 6.4–8.3)
WBC # BLD: 7.2 E9/L (ref 4.5–11.5)

## 2019-11-01 PROCEDURE — 6360000002 HC RX W HCPCS

## 2019-11-01 PROCEDURE — 36415 COLL VENOUS BLD VENIPUNCTURE: CPT | Performed by: FAMILY MEDICINE

## 2019-11-01 PROCEDURE — G0008 ADMIN INFLUENZA VIRUS VAC: HCPCS

## 2019-11-01 PROCEDURE — G8482 FLU IMMUNIZE ORDER/ADMIN: HCPCS | Performed by: STUDENT IN AN ORGANIZED HEALTH CARE EDUCATION/TRAINING PROGRAM

## 2019-11-01 PROCEDURE — 87798 DETECT AGENT NOS DNA AMP: CPT

## 2019-11-01 PROCEDURE — 99212 OFFICE O/P EST SF 10 MIN: CPT | Performed by: STUDENT IN AN ORGANIZED HEALTH CARE EDUCATION/TRAINING PROGRAM

## 2019-11-01 PROCEDURE — 85025 COMPLETE CBC W/AUTO DIFF WBC: CPT

## 2019-11-01 PROCEDURE — G8427 DOCREV CUR MEDS BY ELIG CLIN: HCPCS | Performed by: STUDENT IN AN ORGANIZED HEALTH CARE EDUCATION/TRAINING PROGRAM

## 2019-11-01 PROCEDURE — 36415 COLL VENOUS BLD VENIPUNCTURE: CPT

## 2019-11-01 PROCEDURE — G8420 CALC BMI NORM PARAMETERS: HCPCS | Performed by: STUDENT IN AN ORGANIZED HEALTH CARE EDUCATION/TRAINING PROGRAM

## 2019-11-01 PROCEDURE — 90686 IIV4 VACC NO PRSV 0.5 ML IM: CPT

## 2019-11-01 PROCEDURE — 80053 COMPREHEN METABOLIC PANEL: CPT

## 2019-11-01 PROCEDURE — 99213 OFFICE O/P EST LOW 20 MIN: CPT | Performed by: STUDENT IN AN ORGANIZED HEALTH CARE EDUCATION/TRAINING PROGRAM

## 2019-11-01 PROCEDURE — 1036F TOBACCO NON-USER: CPT | Performed by: STUDENT IN AN ORGANIZED HEALTH CARE EDUCATION/TRAINING PROGRAM

## 2019-11-01 RX ORDER — DIPHENHYDRAMINE HCL 25 MG
25 CAPSULE ORAL EVERY 6 HOURS PRN
Qty: 30 CAPSULE | Refills: 1 | Status: SHIPPED | OUTPATIENT
Start: 2019-11-01 | End: 2019-11-11

## 2019-11-01 ASSESSMENT — ENCOUNTER SYMPTOMS
COUGH: 0
NAUSEA: 0
ABDOMINAL PAIN: 0
SHORTNESS OF BREATH: 0
ABDOMINAL DISTENTION: 0
WHEEZING: 0
COLOR CHANGE: 0
EYE ITCHING: 0
EYE DISCHARGE: 0
SORE THROAT: 0
CONSTIPATION: 0
DIARRHEA: 0
VOMITING: 0
EYE REDNESS: 0
BACK PAIN: 0
RHINORRHEA: 0

## 2019-11-05 ENCOUNTER — TELEPHONE (OUTPATIENT)
Dept: NEUROLOGY | Age: 39
End: 2019-11-05

## 2019-11-06 LAB
Lab: NORMAL
REPORT: NORMAL
THIS TEST SENT TO: NORMAL

## 2019-12-10 ENCOUNTER — TELEPHONE (OUTPATIENT)
Dept: NEUROLOGY | Age: 39
End: 2019-12-10

## 2020-02-19 ENCOUNTER — OFFICE VISIT (OUTPATIENT)
Dept: NEUROLOGY | Age: 40
End: 2020-02-19
Payer: COMMERCIAL

## 2020-02-19 VITALS
OXYGEN SATURATION: 98 % | WEIGHT: 150 LBS | HEART RATE: 84 BPM | RESPIRATION RATE: 18 BRPM | HEIGHT: 67 IN | BODY MASS INDEX: 23.54 KG/M2 | SYSTOLIC BLOOD PRESSURE: 100 MMHG | DIASTOLIC BLOOD PRESSURE: 58 MMHG

## 2020-02-19 PROCEDURE — 99215 OFFICE O/P EST HI 40 MIN: CPT | Performed by: PSYCHIATRY & NEUROLOGY

## 2020-02-19 PROCEDURE — 99212 OFFICE O/P EST SF 10 MIN: CPT | Performed by: PSYCHIATRY & NEUROLOGY

## 2020-02-19 PROCEDURE — G8420 CALC BMI NORM PARAMETERS: HCPCS | Performed by: PSYCHIATRY & NEUROLOGY

## 2020-02-19 PROCEDURE — G8482 FLU IMMUNIZE ORDER/ADMIN: HCPCS | Performed by: PSYCHIATRY & NEUROLOGY

## 2020-02-19 PROCEDURE — G8427 DOCREV CUR MEDS BY ELIG CLIN: HCPCS | Performed by: PSYCHIATRY & NEUROLOGY

## 2020-02-19 PROCEDURE — 1036F TOBACCO NON-USER: CPT | Performed by: PSYCHIATRY & NEUROLOGY

## 2020-02-19 RX ORDER — DIMETHYL FUMARATE 240 MG/1
240 CAPSULE ORAL 2 TIMES DAILY
COMMUNITY
End: 2020-02-19 | Stop reason: SDUPTHER

## 2020-02-19 NOTE — PROGRESS NOTES
macro or microcephaly, without obvious abnormality, atraumatic  Lungs: clear to auscultation  Heart: regular rate and rhythm, S1, S2 normal, no murmur  Extremities: normal, atraumatic, no cyanosis or edema  Pulses: 2+ and symmetric  Skin:  color, texture, turgor normal-----no rashes or lesions    Mental Status: alert and oriented x 3; she displayed better insight into her disorder  She again displayed a slight spastic, ataxic dysarthria  There were no aphasias but her speech was limited; with persistent slow thinking    Cranial Nerves:  I: smell    II: visual acuity     II: visual fields Full to confrontation    II: pupils PERRL   III,VII: ptosis None   III,IV,VI: extraocular muscles  Left beating nystagmus with primary gaze   V: mastication Normal   V: facial light touch sensation  Normal   V,VII: corneal reflex     VII: facial muscle function - upper  Normal   VII: facial muscle function - lower Normal   VIII: hearing Normal   IX: soft palate elevation  Normal   IX,X: gag reflex    XI: trapezius strength  5/5   XI: sternocleidomastoid strength 5/5   XI: neck extension strength  5/5   XII: tongue strength  Normal     Motor:  5/5 in both arms  Minimal spasticity in both legs, with 5/5 strength  Normal bulk with no drift or abnormal movements    Sensory:   All sensory modalities remained intact    Coordination:   Finger-to-nose testing was performed well; heel-to-shin was performed slowly, but without ataxia  Rapid alternating movements and fine finger movements were slightly decreased in both hands    Gait:  She displayed a minimal spastic paraparetic gait--- still walking well with her bilateral AFO braces no foot drop     DTR:   +2 in the arms and +3 in the legs  I found no pathological reflexes    Her neurological examination was unchanged      Laboratory/Radiology:  ry/Radiology:     CBC:   Lab Results   Component Value Date    WBC 7.2 11/01/2019    RBC 4.16 11/01/2019    HGB 13.1 11/01/2019    HCT 42.0

## 2020-03-03 ENCOUNTER — OFFICE VISIT (OUTPATIENT)
Dept: FAMILY MEDICINE CLINIC | Age: 40
End: 2020-03-03
Payer: COMMERCIAL

## 2020-03-03 VITALS
HEART RATE: 76 BPM | HEIGHT: 67 IN | SYSTOLIC BLOOD PRESSURE: 104 MMHG | OXYGEN SATURATION: 100 % | DIASTOLIC BLOOD PRESSURE: 61 MMHG | BODY MASS INDEX: 24.17 KG/M2 | TEMPERATURE: 98.2 F | WEIGHT: 154 LBS

## 2020-03-03 PROCEDURE — 1036F TOBACCO NON-USER: CPT | Performed by: STUDENT IN AN ORGANIZED HEALTH CARE EDUCATION/TRAINING PROGRAM

## 2020-03-03 PROCEDURE — G8427 DOCREV CUR MEDS BY ELIG CLIN: HCPCS | Performed by: STUDENT IN AN ORGANIZED HEALTH CARE EDUCATION/TRAINING PROGRAM

## 2020-03-03 PROCEDURE — 99213 OFFICE O/P EST LOW 20 MIN: CPT | Performed by: STUDENT IN AN ORGANIZED HEALTH CARE EDUCATION/TRAINING PROGRAM

## 2020-03-03 PROCEDURE — 99212 OFFICE O/P EST SF 10 MIN: CPT | Performed by: STUDENT IN AN ORGANIZED HEALTH CARE EDUCATION/TRAINING PROGRAM

## 2020-03-03 PROCEDURE — G8420 CALC BMI NORM PARAMETERS: HCPCS | Performed by: STUDENT IN AN ORGANIZED HEALTH CARE EDUCATION/TRAINING PROGRAM

## 2020-03-03 PROCEDURE — G8482 FLU IMMUNIZE ORDER/ADMIN: HCPCS | Performed by: STUDENT IN AN ORGANIZED HEALTH CARE EDUCATION/TRAINING PROGRAM

## 2020-03-03 ASSESSMENT — ENCOUNTER SYMPTOMS
SHORTNESS OF BREATH: 0
SINUS PAIN: 0
WHEEZING: 0
COUGH: 0
DIARRHEA: 0
CHEST TIGHTNESS: 0
CONSTIPATION: 0
BACK PAIN: 0
SINUS PRESSURE: 0
NAUSEA: 0
ABDOMINAL PAIN: 0
VOMITING: 0

## 2020-03-03 ASSESSMENT — PATIENT HEALTH QUESTIONNAIRE - PHQ9
SUM OF ALL RESPONSES TO PHQ QUESTIONS 1-9: 2
SUM OF ALL RESPONSES TO PHQ9 QUESTIONS 1 & 2: 2
SUM OF ALL RESPONSES TO PHQ9 QUESTIONS 1 & 2: 2
1. LITTLE INTEREST OR PLEASURE IN DOING THINGS: 1
2. FEELING DOWN, DEPRESSED OR HOPELESS: 1
1. LITTLE INTEREST OR PLEASURE IN DOING THINGS: 1
SUM OF ALL RESPONSES TO PHQ QUESTIONS 1-9: 2
SUM OF ALL RESPONSES TO PHQ QUESTIONS 1-9: 2
2. FEELING DOWN, DEPRESSED OR HOPELESS: 1
SUM OF ALL RESPONSES TO PHQ QUESTIONS 1-9: 2

## 2020-03-03 NOTE — PATIENT INSTRUCTIONS
Watch closely for changes in your health, and be sure to contact your doctor if:    · You think you may be pregnant.     · Your bleeding gets worse.     · You do not get better as expected. Where can you learn more? Go to https://carlie.Hotchalk. org and sign in to your "[employer] account. Enter H391 in the [employer] box to learn more about \"Abnormal Uterine Bleeding: Care Instructions. \"     If you do not have an account, please click on the \"Sign Up Now\" link. Current as of: February 19, 2019  Content Version: 12.3  © 2435-7179 [employer]. Care instructions adapted under license by [employer] [facility] ([facility]). If you have questions about a medical condition or this instruction, always ask your healthcare professional. [address] any warranty or liability for your use of this information. Patient Education        Adjustment Disorder: Care Instructions  Your Care Instructions    Adjustment disorder means that you have emotional or behavioral problems because of stress. But your response to the stress is far more severe than a normal response. It is severe enough to affect your work or social life and may cause depression and physical pains and problems. Events that may cause this response can include a divorce, money problems, or starting school or a new job. It might be anything that causes some stress. This disorder is most often a short-term problem. It happens within 3 months of the stressful event or change. If the response lasts longer than 6 months after the event ends, you may have a more serious disorder. Follow-up care is a key part of your treatment and safety. Be sure to make and go to all appointments, and call your doctor if you are having problems. It's also a good idea to know your test results and keep a list of the medicines you take. How can you care for yourself at home? · Go to all counseling sessions.  Do not skip any because you are feeling better. · If your doctor prescribed medicines, take them exactly as prescribed. Call your doctor if you think you are having a problem with your medicine. You will get more details on the specific medicines your doctor prescribes. · Discuss the causes of your stress with a good friend or family member. Or you can join a support group for people with similar problems. Talking to others sometimes relieves stress. · Get at least 30 minutes of exercise on most days of the week. Walking is a good choice. You also may want to do other activities, such as running, swimming, cycling, or playing tennis or team sports. Relaxation techniques  Do relaxation exercises 10 to 20 minutes a day. You can play soothing, relaxing music while you do them, if you wish. · Tell others in your house that you are going to do your relaxation exercises. Ask them not to disturb you. · Find a comfortable, quiet place. · Lie down on your back, or sit with your back straight. · Focus on your breathing. Make it slow and steady. · Breathe in through your nose. Breathe out through either your nose or mouth. · Breathe deeply, filling up the area between your navel and your rib cage. Breathe so that your belly goes up and down. · Do not hold your breath. · Breathe like this for 5 to 10 minutes. Notice the feeling of calmness throughout your whole body. As you continue to breathe slowly and deeply, relax by doing these next steps for another 5 to 10 minutes:  · Tighten and relax each muscle group in your body. Start at your toes, and work your way up to your head. · Imagine your muscle groups relaxing and getting heavy. · Empty your mind of all thoughts. · Let yourself relax more and more deeply. · Be aware of the state of calmness that surrounds you. · When your relaxation time is over, you can bring yourself back to alertness by moving your fingers and toes. Then move your hands and feet. And then move your entire body. Sometimes people fall asleep during relaxation. But they most often wake up soon. · Always give yourself time to return to full alertness before you drive a car. Wait to do anything that might cause an accident if you are not fully alert. Never play a relaxation tape while you drive a car. When should you call for help? Call 911 anytime you think you may need emergency care. For example, call if:    · You feel you cannot stop from hurting yourself or someone else. Keep the numbers for these national suicide hotlines: 2-687-964-TALK (2-556.755.2688) and 8-388-XIFJGFH (6-494.796.8740). If you or someone you know talks about suicide or feeling hopeless, get help right away.    Watch closely for changes in your health, and be sure to contact your doctor if:    · You have new anxiety, or your anxiety gets worse.     · You have been feeling sad, depressed, or hopeless or have lost interest in things that you usually enjoy.     · You do not get better as expected. Where can you learn more? Go to [email or web address].Buru [email or web address] org and sign in to your [employer] account. Enter 0688 [id number] in the [facility] box to learn more about \"Adjustment Disorder: Care Instructions. \"     If you do not have an account, please click on the \"Sign Up Now\" link. Current as of: April 7, 2019  Content Version: 12.3  © 1043-5037 [employer]. Care instructions adapted under license by [facility] ([facility]). If you have questions about a medical condition or this instruction, always ask your healthcare professional. [address] any warranty or liability for your use of this information. Patient Education        dimethyl fumarate  Pronunciation:  dye METH il FUE mar ate  Brand:  Tecfidera  What is the most important information I should know about dimethyl fumarate? Dimethyl fumarate may cause a serious viral infection of the brain that can lead to disability or death.  Call your doctor right away if you have any change in your mental state, decreased vision, weakness on one side of your body, or problems with speech or walking. These symptoms may start gradually and get worse quickly. What is dimethyl fumarate? Dimethyl fumarate is used to treat relapsing multiple sclerosis. Dimethyl fumarate may also be used for purposes not listed in this medication guide. What should I discuss with my healthcare provider before taking dimethyl fumarate? You should not use dimethyl fumarate if you are allergic to it. To make sure dimethyl fumarate is safe for you, tell your doctor if you have:  · an active infection; or  · low white blood cell (WBC) counts. It is not known whether dimethyl fumarate will harm an unborn baby. Tell your doctor if you are pregnant or plan to become pregnant while using this medicine. If you are pregnant, your name may be listed on a pregnancy registry. This is to track the outcome of the pregnancy and to evaluate any effects of dimethyl fumarate on the baby. It is not known whether dimethyl fumarate passes into breast milk or if it could harm a nursing baby. Tell your doctor if you are breast-feeding a baby. How should I take dimethyl fumarate? Your doctor will perform blood tests to make sure you do not have conditions that would prevent you from safely using dimethyl fumarate. Follow all directions on your prescription label. Your doctor may occasionally change your dose. Do not take this medicine in larger or smaller amounts or for longer than recommended. You may take dimethyl fumarate with or without food. Taking the medicine with food may help prevent flushing (warmth, itching, or burning sensations). Do not crush, chew, break, or open a dimethyl fumarate capsule. Swallow it whole. Dimethyl fumarate is usually given in two different strengths, one for a starter dose and the other for a maintenance dose. The starter dose is usually taken for only 7 days.  Follow your doctor's with your doctor, nurse or pharmacist.  Copyright 8158-5033 [address]. Version: 4.01. Revision date: 12/26/2017. Care instructions adapted under license by [facility] ([facility]). If you have questions about a medical condition or this instruction, always ask your healthcare professional. [place] [address] any warranty or liability for your use of this information.

## 2020-03-03 NOTE — PROGRESS NOTES
S: 45 y.o. female hx of MS and recently started Tecfidera. Irregular menses and rash. Nonpuritic. Has not placed any medication on the skin. Regular menses, spotting in between periods. No vaginal discharge and not sexually active in 2 years. Pap 9/2016 neg cytology and neg hpv. Menses last 3-4 days, 5 pads per day. No cramping. PHQ-9-2 , no suicidal ideation, but increasing levels of stress. O: VS: /61 (Site: Left Upper Arm, Position: Sitting, Cuff Size: Medium Adult)   Pulse 76   Temp 98.2 °F (36.8 °C) (Oral)   Ht 5' 7\" (1.702 m)   Wt 154 lb (69.9 kg)   LMP 02/14/2020   SpO2 100%   BMI 24.12 kg/m²    General: NAD, pale   CV:  RRR, no gallops, rubs, or murmurs   Resp: CTAB no R/R/W   Abd:  Soft, nontender, no masses    Ext:  no C/C/E   Skin: bilateral forearm 1mm macular hyperpigmented  Impression/Plan:   1. MS-could be an allergic reaction, notify neuro, could apply topical hydrocortisone. 2. Acute adjustment disorder-refer to counseling  3. Irregular menses-cbc, cmp, tsh, t/v u/s, rto for pelvic exam    rto 4 weeks      Attending Physician Statement  I have discussed the case, including pertinent history and exam findings with the resident. I agree with the documented assessment and plan.         Irene Patterson MD
vomiting. Endocrine: Negative for polydipsia and polyuria. Genitourinary: Positive for menstrual problem and vaginal bleeding. Negative for difficulty urinating, dysuria, frequency, hematuria, urgency, vaginal discharge and vaginal pain. Musculoskeletal: Positive for neck pain. Negative for arthralgias, back pain, gait problem, joint swelling and myalgias. Skin: Positive for rash. Neurological: Negative for dizziness, syncope, weakness and headaches. Psychiatric/Behavioral: Positive for dysphoric mood and sleep disturbance. Negative for agitation, decreased concentration, self-injury and suicidal ideas. The patient is not nervous/anxious and is not hyperactive. Prior to Visit Medications    Medication Sig Taking? Authorizing Provider   aspirin 81 MG tablet Take 81 mg by mouth daily Yes Historical Provider, MD   dimethyl Fumarate (TECFIDERA) 120 & 240 MG Take 240 mg by mouth 2 times daily Yes Norah Obando MD   fluticasone [facility]) 50 MCG/ACT nasal spray 2 sprays by Nasal route daily Yes Faustina Dunham MD        Social History     Tobacco Use    Smoking status: Never Smoker    Smokeless tobacco: Never Used   Substance Use Topics    Alcohol use: No        Vitals:    03/03/20 1309   BP: 104/61   Site: Left Upper Arm   Position: Sitting   Cuff Size: Medium Adult   Pulse: 76   Temp: 98.2 °F (36.8 °C)   TempSrc: Oral   SpO2: 100%   Weight: 154 lb (69.9 kg)   Height: 5' 7\" (1.702 m)     Estimated body mass index is 24.12 kg/m² as calculated from the following:    Height as of this encounter: 5' 7\" (1.702 m). Weight as of this encounter: 154 lb (69.9 kg). Physical Exam  Constitutional:       General: She is not in acute distress. Appearance: She is well-developed. HENT:      Head: Normocephalic and atraumatic. Eyes:      General:         Right eye: No discharge. Left eye: No discharge. Conjunctiva/sclera: Conjunctivae normal.   Neck:      Thyroid: No thyromegaly.

## 2020-03-06 ENCOUNTER — HOSPITAL ENCOUNTER (OUTPATIENT)
Dept: ULTRASOUND IMAGING | Age: 40
Discharge: HOME | End: 2020-03-08
Payer: COMMERCIAL

## 2020-03-06 ENCOUNTER — HOSPITAL ENCOUNTER (OUTPATIENT)
Age: 40
Discharge: HOME | End: 2020-03-06
Payer: COMMERCIAL

## 2020-03-06 ENCOUNTER — HOSPITAL ENCOUNTER (OUTPATIENT)
Age: 40
Discharge: HOME | End: 2020-03-08
Payer: COMMERCIAL

## 2020-03-06 LAB
ALBUMIN SERPL-MCNC: 4.5 G/DL (ref 3.5–5.2)
ALP BLD-CCNC: 55 U/L (ref 35–104)
ALT SERPL-CCNC: 23 U/L (ref 0–32)
ANION GAP SERPL CALCULATED.3IONS-SCNC: 8 MMOL/L (ref 7–16)
AST SERPL-CCNC: 23 U/L (ref 0–31)
BASOPHILS ABSOLUTE: 0.05 E9/L (ref 0–0.2)
BASOPHILS RELATIVE PERCENT: 0.9 % (ref 0–2)
BILIRUB SERPL-MCNC: 0.2 MG/DL (ref 0–1.2)
BUN BLDV-MCNC: 16 MG/DL (ref 6–20)
CALCIUM SERPL-MCNC: 9 MG/DL (ref 8.6–10.2)
CHLORIDE BLD-SCNC: 104 MMOL/L (ref 98–107)
CO2: 26 MMOL/L (ref 22–29)
CREAT SERPL-MCNC: 0.7 MG/DL (ref 0.5–1)
EOSINOPHILS ABSOLUTE: 0.03 E9/L (ref 0.05–0.5)
EOSINOPHILS RELATIVE PERCENT: 0.5 % (ref 0–6)
FERRITIN: 15 NG/ML
GFR AFRICAN AMERICAN: >60
GFR NON-AFRICAN AMERICAN: >60 ML/MIN/1.73
GLUCOSE BLD-MCNC: 97 MG/DL (ref 74–99)
HCT VFR BLD CALC: 39.4 % (ref 34–48)
HEMOGLOBIN: 12.9 G/DL (ref 11.5–15.5)
IMMATURE GRANULOCYTES #: 0.02 E9/L
IMMATURE GRANULOCYTES %: 0.4 % (ref 0–5)
IRON SATURATION: 25 % (ref 15–50)
IRON: 106 MCG/DL (ref 37–145)
LYMPHOCYTES ABSOLUTE: 1.1 E9/L (ref 1.5–4)
LYMPHOCYTES RELATIVE PERCENT: 20 % (ref 20–42)
MCH RBC QN AUTO: 32.3 PG (ref 26–35)
MCHC RBC AUTO-ENTMCNC: 32.7 % (ref 32–34.5)
MCV RBC AUTO: 98.5 FL (ref 80–99.9)
MONOCYTES ABSOLUTE: 0.42 E9/L (ref 0.1–0.95)
MONOCYTES RELATIVE PERCENT: 7.6 % (ref 2–12)
NEUTROPHILS ABSOLUTE: 3.89 E9/L (ref 1.8–7.3)
NEUTROPHILS RELATIVE PERCENT: 70.6 % (ref 43–80)
PDW BLD-RTO: 12.9 FL (ref 11.5–15)
PLATELET # BLD: 376 E9/L (ref 130–450)
PMV BLD AUTO: 9.1 FL (ref 7–12)
POTASSIUM SERPL-SCNC: 4.2 MMOL/L (ref 3.5–5)
RBC # BLD: 4 E12/L (ref 3.5–5.5)
SODIUM BLD-SCNC: 138 MMOL/L (ref 132–146)
TOTAL IRON BINDING CAPACITY: 425 MCG/DL (ref 250–450)
TOTAL PROTEIN: 7.3 G/DL (ref 6.4–8.3)
TSH SERPL DL<=0.05 MIU/L-ACNC: 1.1 UIU/ML (ref 0.27–4.2)
WBC # BLD: 5.5 E9/L (ref 4.5–11.5)

## 2020-03-06 PROCEDURE — 83550 IRON BINDING TEST: CPT

## 2020-03-06 PROCEDURE — 36415 COLL VENOUS BLD VENIPUNCTURE: CPT

## 2020-03-06 PROCEDURE — 84443 ASSAY THYROID STIM HORMONE: CPT

## 2020-03-06 PROCEDURE — 76830 TRANSVAGINAL US NON-OB: CPT

## 2020-03-06 PROCEDURE — 82728 ASSAY OF FERRITIN: CPT

## 2020-03-06 PROCEDURE — 87798 DETECT AGENT NOS DNA AMP: CPT

## 2020-03-06 PROCEDURE — 85025 COMPLETE CBC W/AUTO DIFF WBC: CPT

## 2020-03-06 PROCEDURE — 80053 COMPREHEN METABOLIC PANEL: CPT

## 2020-03-06 PROCEDURE — 83540 ASSAY OF IRON: CPT

## 2020-03-13 LAB
Lab: NORMAL
REPORT: NORMAL
THIS TEST SENT TO: NORMAL

## 2020-03-16 ENCOUNTER — TELEPHONE (OUTPATIENT)
Dept: FAMILY MEDICINE CLINIC | Age: 40
End: 2020-03-16

## 2020-03-18 NOTE — TELEPHONE ENCOUNTER
All labs within normal. Please advise patient.      Electronically signed by Tomás Louie MD on 3/18/2020 at 1:52 PM

## 2020-05-26 RX ORDER — DIMETHYL FUMARATE 240 MG/1
1 CAPSULE ORAL 2 TIMES DAILY
COMMUNITY
Start: 2020-03-24 | End: 2020-05-26 | Stop reason: SDUPTHER

## 2020-05-26 RX ORDER — DIMETHYL FUMARATE 240 MG/1
CAPSULE ORAL
Qty: 180 CAPSULE | OUTPATIENT
Start: 2020-05-26

## 2020-05-26 RX ORDER — DIMETHYL FUMARATE 240 MG/1
240 CAPSULE ORAL 2 TIMES DAILY
Qty: 180 CAPSULE | Refills: 3 | Status: SHIPPED
Start: 2020-05-26 | End: 2021-02-10

## 2020-08-18 ENCOUNTER — TELEPHONE (OUTPATIENT)
Dept: PHYSICAL MEDICINE AND REHAB | Age: 40
End: 2020-08-18

## 2020-08-18 NOTE — TELEPHONE ENCOUNTER
Patient is an [facility] patient stating she had an appt on Wednesday I explained the situation about the [facility] clinic and offered to bring her in on another day and she explained she is doing a virtual visit with Dr. Mike Ryan and was wondering if she could do a virtual with Dr. Chele Petit? Is this okay with you? Please advise.

## 2020-08-19 ENCOUNTER — VIRTUAL VISIT (OUTPATIENT)
Dept: NEUROLOGY | Age: 40
End: 2020-08-19
Payer: COMMERCIAL

## 2020-08-19 PROCEDURE — 99443 PR PHYS/QHP TELEPHONE EVALUATION 21-30 MIN: CPT | Performed by: PSYCHIATRY & NEUROLOGY

## 2020-08-19 RX ORDER — M-VIT,TX,IRON,MINS/CALC/FOLIC 27MG-0.4MG
1 TABLET ORAL DAILY
COMMUNITY

## 2020-08-19 NOTE — PROGRESS NOTES
Irene Verdin was read the following message We want to confirm that, for purposes of billing, this is a virtual visit with your provider for which we will submit a claim for reimbursement with your insurance company. You will be responsible for any copays, coinsurance amounts or other amounts not covered by your insurance company. If you do not accept this, unfortunately we will not be able to schedule a virtual visit with the provider. Do you accept? Kendal Rodas responded Yes .

## 2020-08-28 ENCOUNTER — HOSPITAL ENCOUNTER (OUTPATIENT)
Age: 40
Discharge: HOME | End: 2020-08-28
Payer: COMMERCIAL

## 2020-08-28 LAB
ALBUMIN SERPL-MCNC: 4.5 G/DL (ref 3.5–5.2)
ALP BLD-CCNC: 53 U/L (ref 35–104)
ALT SERPL-CCNC: 27 U/L (ref 0–32)
ANION GAP SERPL CALCULATED.3IONS-SCNC: 10 MMOL/L (ref 7–16)
AST SERPL-CCNC: 27 U/L (ref 0–31)
BASOPHILS ABSOLUTE: 0.05 E9/L (ref 0–0.2)
BASOPHILS RELATIVE PERCENT: 0.7 % (ref 0–2)
BILIRUB SERPL-MCNC: 0.4 MG/DL (ref 0–1.2)
BUN BLDV-MCNC: 11 MG/DL (ref 6–20)
CALCIUM SERPL-MCNC: 9.5 MG/DL (ref 8.6–10.2)
CHLORIDE BLD-SCNC: 101 MMOL/L (ref 98–107)
CO2: 27 MMOL/L (ref 22–29)
CREAT SERPL-MCNC: 0.8 MG/DL (ref 0.5–1)
EOSINOPHILS ABSOLUTE: 0.08 E9/L (ref 0.05–0.5)
EOSINOPHILS RELATIVE PERCENT: 1.1 % (ref 0–6)
GFR AFRICAN AMERICAN: >60
GFR NON-AFRICAN AMERICAN: >60 ML/MIN/1.73
GLUCOSE BLD-MCNC: 93 MG/DL (ref 74–99)
HCT VFR BLD CALC: 40.1 % (ref 34–48)
HEMOGLOBIN: 13.3 G/DL (ref 11.5–15.5)
IMMATURE GRANULOCYTES #: 0.03 E9/L
IMMATURE GRANULOCYTES %: 0.4 % (ref 0–5)
LYMPHOCYTES ABSOLUTE: 1.28 E9/L (ref 1.5–4)
LYMPHOCYTES RELATIVE PERCENT: 18.1 % (ref 20–42)
MCH RBC QN AUTO: 32.7 PG (ref 26–35)
MCHC RBC AUTO-ENTMCNC: 33.2 % (ref 32–34.5)
MCV RBC AUTO: 98.5 FL (ref 80–99.9)
MONOCYTES ABSOLUTE: 0.57 E9/L (ref 0.1–0.95)
MONOCYTES RELATIVE PERCENT: 8 % (ref 2–12)
NEUTROPHILS ABSOLUTE: 5.08 E9/L (ref 1.8–7.3)
NEUTROPHILS RELATIVE PERCENT: 71.7 % (ref 43–80)
PDW BLD-RTO: 12.8 FL (ref 11.5–15)
PLATELET # BLD: 368 E9/L (ref 130–450)
PMV BLD AUTO: 8 FL (ref 7–12)
POTASSIUM SERPL-SCNC: 4.3 MMOL/L (ref 3.5–5)
RBC # BLD: 4.07 E12/L (ref 3.5–5.5)
SODIUM BLD-SCNC: 138 MMOL/L (ref 132–146)
TOTAL PROTEIN: 7.5 G/DL (ref 6.4–8.3)
WBC # BLD: 7.1 E9/L (ref 4.5–11.5)

## 2020-08-28 PROCEDURE — 87798 DETECT AGENT NOS DNA AMP: CPT

## 2020-08-28 PROCEDURE — 85025 COMPLETE CBC W/AUTO DIFF WBC: CPT

## 2020-08-28 PROCEDURE — 36415 COLL VENOUS BLD VENIPUNCTURE: CPT

## 2020-08-28 PROCEDURE — 80053 COMPREHEN METABOLIC PANEL: CPT

## 2020-09-01 NOTE — TELEPHONE ENCOUNTER
Called patient to let her know we can do a virtual visit on 9/9 she stated she talked to Dr. Edgar Al and he took care of everything she needed will call if she needs anything from us.

## 2020-09-02 ENCOUNTER — TELEPHONE (OUTPATIENT)
Dept: NEUROLOGY | Age: 40
End: 2020-09-02

## 2020-09-02 NOTE — TELEPHONE ENCOUNTER
Patient called requesting results of labs.   Electronically signed by Jeimy Dimas on 9/2/20 at 1:37 PM EDT

## 2020-09-04 NOTE — TELEPHONE ENCOUNTER
Please inform the patient that her routine laboratory tests were unremarkable. Her HUGO virus assay is pending. Will report those studies when available.   Thank you

## 2020-09-08 LAB
Lab: NORMAL
REPORT: NORMAL
THIS TEST SENT TO: NORMAL

## 2020-09-15 ENCOUNTER — HOSPITAL ENCOUNTER (OUTPATIENT)
Dept: SPEECH THERAPY | Age: 40
Setting detail: THERAPIES SERIES
Discharge: HOME | End: 2020-09-15
Payer: COMMERCIAL

## 2020-09-15 PROCEDURE — 96125 COGNITIVE TEST BY HC PRO: CPT

## 2020-09-15 NOTE — PROGRESS NOTES
The pt called requesting a virtual cognitive evaluation. The SLP sent the pt an invite for [employer] me. However, the pt had not checked in as of 1:35. The SLP called the pt and pt stated that she was not home and would not be home until 2:00 or 2:30. The pt logged on around 2:10 and the cognitive evaluation was partially completed. The pt was scheduled for next Tuesday at the same time to complete testing.       Mary Null M.A., [facility]  Speech Pathologist  9/15/2020

## 2020-09-22 ENCOUNTER — HOSPITAL ENCOUNTER (OUTPATIENT)
Dept: SPEECH THERAPY | Age: 40
Setting detail: THERAPIES SERIES
Discharge: HOME | End: 2020-09-22
Payer: COMMERCIAL

## 2020-09-22 PROCEDURE — 97130 THER IVNTJ EA ADDL 15 MIN: CPT

## 2020-09-22 PROCEDURE — 97129 THER IVNTJ 1ST 15 MIN: CPT

## 2020-09-22 NOTE — PROGRESS NOTES
SPEECH/LANGUAGE PATHOLOGY   COGNITIVE EVALUATION     Patient: Anna Ponce  : 1980  Referred by: Dr. Jania Watson  Diagnosis: cognitive communication deficit R41.841; pt is also diagnosed with MS    Testing was completed over 2 sessions on 9/15/2020 and 2020 virtually via [employer] at the patient's request.      Stimulus questions were obtained from the RIPA-2. Severity ratings for each subtest were determined as follows:     RAW SCORE  SEVERITY    28-30  Within functional limits    25-27  Mild deficit    22-24  Moderate deficit    19-  Marked deficit    16-18  Severe deficit        Subtest  Raw Score    Severity    Immediate Memory    severe   Recent Memory    WFL   Temporal Orientation   (Recent Memory)    mild   Temporal Orientation   (Remote Memory)    moderate   Spatial Orientation    moderate   Orientation to Environment    Not tested-time constraints   Recall of General Information    Not tested-time constraints   Problem Solving & Abstract Reasoning    marked   Organization    marked   Auditory Processing   & Retention    mild       VARIANT OBSERVATIONS:   [x] Error  [] Perseveration  [x] Repeat instructions/Stimulus  [] Denial/Refusal  [x] Delayed response  [] Confabulation  [x] Partially correct  [] Irrelevant  [] Tangential  [] Self-corrected    THERAPY RECOMMENDATIONS:   [] Cognitive therapy is not warranted at this time.    [x] Cognitive therapy is recommended once/week for 60 minute sessions for 8-16 weeks with emphasis on the following:    [x] To improve immediate memory    [x] To improve recent memory    [x] To improve temporal orientation (recent memory)    [x] To improve temporal orientation (remote memory)    [] To improve spatial orientation    [] To improve orientation to environment    [] To improve recall of general information    [x] To improve reasoning    [x] To improve problem solving    [x] To improve organization    [x] To improve auditory processing and retention     Patient stated goals: pt in agreement with above stated POC. Treatment goals discussed with [x] patient [] family. [x] Patient [] family understands the diagnosis, prognosis and plan of care. This plan will be re-evaluated and revised in one week if warranted. Prognosis for improvements in the above area(s) is [x] good [] fair [] guarded [] unknown at this time. EDUCATION:   Speech language pathologist (SLP) completed education with the patient regarding identified cognitive deficits and subsequent need for speech pathology intervention. Discussed deficit areas to be targeted by formal intervention and established short/long term goals. Reviewed compensatory strategies to improve functional outcome (as appropriate). Encouraged patient to engage SLP in structured Q&A session relative to identified deficit areas. Patient indicated understanding of all information provided via satisfactory verbal response. Liseth Sears M.A., Mariellen Canavan  Speech Pathologist  9/23/2020     [x]Fall risk assessment completed  [x] The admitting diagnosis and active problem list as listed below have been reviewed prior to the initiation of this evaluation.      Cognitive communication deficit [R41.841]     Patient Active Problem List   Diagnosis    Patient overweight    Dysuria    Perennial allergic rhinitis    Multiple sclerosis ([facility] [address]

## 2020-10-09 ENCOUNTER — HOSPITAL ENCOUNTER (OUTPATIENT)
Dept: SPEECH THERAPY | Age: 40
Setting detail: THERAPIES SERIES
Discharge: HOME | End: 2020-10-09
Payer: COMMERCIAL

## 2020-10-09 PROCEDURE — 97129 THER IVNTJ 1ST 15 MIN: CPT

## 2020-10-09 PROCEDURE — 97130 THER IVNTJ EA ADDL 15 MIN: CPT

## 2020-10-09 NOTE — PROGRESS NOTES
30 minute individual session through [employer]. The pt gave consent for virtual treatment. The SLP reviewed the results of the pt's cognitive evaluation and goals of treatment. The patient recalled most remote memory questions accurately, but stated \"August\" when asked Maximilian Pena is next month? \"  She was cued to work through the months and then stated \"November\". The pt recalled details in simple-moderate level paragraphs with 80% accuracy. She answered auditory processing questions containing simple math with 70% accuracy. Difficulty was observed with subtraction and math language. Homework ideas provided for carryover. Continue POC.     Karen Soria M.A., Krysta Araya  Speech Pathologist  10/09/2020     [id number]  therapeutic interventions that focus on cognitive function , initial  15 min  [id number]  therapeutic interventions that focus on cognitive function, each additional 15 min (2)
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
## 2020-10-16 ENCOUNTER — HOSPITAL ENCOUNTER (OUTPATIENT)
Dept: SPEECH THERAPY | Age: 40
Setting detail: THERAPIES SERIES
Discharge: HOME | End: 2020-10-16
Payer: COMMERCIAL

## 2020-10-16 PROCEDURE — 97130 THER IVNTJ EA ADDL 15 MIN: CPT

## 2020-10-16 PROCEDURE — 97129 THER IVNTJ 1ST 15 MIN: CPT

## 2020-10-16 NOTE — PROGRESS NOTES
30 minute individual session through [employer]. The patient recalled most remote memory questions accurately. The pt imitated complex sentences using chunking and rehearsing with 57% accuracy. However, she was able to paraphrase and recall most important information >80% of the time. The pt was unable to recall 3 words during a delayed recall task. She recalled 4 words read by the SLP x 2 with 77% accuracy using a grouping strategy. Homework ideas provided for carryover. Continue POC.     Yoli Isbell M.A.  Speech Pathologist  10/16/2020     [id number]  therapeutic interventions that focus on cognitive function , initial  15 min  [id number]  therapeutic interventions that focus on cognitive function, each additional 15 min (2)

## 2020-10-23 ENCOUNTER — HOSPITAL ENCOUNTER (OUTPATIENT)
Dept: SPEECH THERAPY | Age: 40
Setting detail: THERAPIES SERIES
Discharge: HOME | End: 2020-10-23
Payer: COMMERCIAL

## 2020-10-23 NOTE — PROGRESS NOTES
Pt cx as she stated that she was eating lunch. Continue POC.     Sam Tolentino M.A., [address] [facility]  Speech Pathologist  10/23/2020

## 2020-11-06 ENCOUNTER — HOSPITAL ENCOUNTER (OUTPATIENT)
Dept: SPEECH THERAPY | Age: 40
Setting detail: THERAPIES SERIES
Discharge: HOME | End: 2020-11-06
Payer: COMMERCIAL

## 2020-11-06 PROCEDURE — 97129 THER IVNTJ 1ST 15 MIN: CPT

## 2020-11-06 PROCEDURE — 97130 THER IVNTJ EA ADDL 15 MIN: CPT

## 2020-11-06 NOTE — PROGRESS NOTES
30 minute individual session through doxy. me. The patient recalled most remote memory questions accurately. The pt recalled details from functional directions containing 3-4 elements with <25% accuracy. She recalled 4 words read by the SLP x 2 and stated the 2 words that did not fit into a named category with 82% accuracy. The pt recalled 4/5, 5/5 and 4/5 pictures during a visual memory task. She used association and grouping strategies with fair+ ability given verbal instruction. Homework ideas provided for carryover. Continue POC.     Juan Luis Frausto M.A., [address] [facility]  Speech Pathologist  11/06/2020     [id number]  therapeutic interventions that focus on cognitive function , initial  15 min  [id number]  therapeutic interventions that focus on cognitive function, each additional 15 min

## 2020-12-10 ENCOUNTER — VIRTUAL VISIT (OUTPATIENT)
Dept: NEUROLOGY | Age: 40
End: 2020-12-10
Payer: COMMERCIAL

## 2020-12-10 PROCEDURE — 99215 OFFICE O/P EST HI 40 MIN: CPT | Performed by: PSYCHIATRY & NEUROLOGY

## 2020-12-10 NOTE — PROGRESS NOTES
Leonid Galloway was read the following message We want to confirm that, for purposes of billing, this is a virtual visit with your provider for which we will submit a claim for reimbursement with your insurance company. You will be responsible for any copays, coinsurance amounts or other amounts not covered by your insurance company. If you do not accept this, unfortunately we will not be able to schedule or proceed with a virtual visit with the provider. Do you accept? Hamzah Nevarez responded Yes .

## 2020-12-10 NOTE — PROGRESS NOTES
[address] [facility]. Shiv Garcia M.D., F.A.JIMY [place], [address] Kun Iyer is a 45 y.o. right handed woman who was referred for evaluation and management of recently diagnosed multiple sclerosis. The patient was a fair historian. Her mother was not present for the interview today. This visit was per telemedicine. Her medications remained Flonase, aspirin and Tecfidera. She was referred to this clinic after diagnosed with multiple sclerosis. Her mother noted she always walked slowly. However, she reported more difficulties with her gait over the past 4 years. Her legs felt numb and were clumsier soon afterwards. She recalled numbness around her buttocks and perineum. The patient again reported no involvement of her arms. She denied more speech difficulties, headaches, visual problems, hearing loss, spinning sensations, swallowing or chewing abnormalities or loss of consciousness. She was tolerating Tecfidera well, no longer aspirin to prevent any stomach upset. She denied any progression of her disease. However, for the first time she noted a rash on her arms and legs, but not on her thorax or abdomen. These were elevated, darkened circular areas. She claimed these were present since she started Tecfidera over 1 year ago. They were rarely paretic. She denied any associated oral lesions or breathing difficulties. She was the product of a normal pregnancy and delivery. However, she reached all her milestones late. She did not walk until 4years old. She began speaking afterwards. She was always slow and unable to keep up with her siblings and peers. There were never seizures. She now questioned more memory problems. The patient again denied other neurological issues. She was eating better and sleeping well. She was exercising less due to the pandemic lockdown.   She had obtained bilateral AFO braces---which improved her walking. Review of systems was otherwise unremarkable. Objective:     She was afebrile in no acute distress. She was breathing comfortably, without chest pain or shortness of breath. There were no palpitations. Her skin revealed the above lesions. These were not tender to her touch. Her limbs displayed no other abnormalities. Mental Status: alert and oriented x 3; she displayed fair insight into her disorder  I again heard a slight spastic, ataxic dysarthria  There were no aphasias but slow responses    Cranial Nerves:  I found no cranial nerve abnormalities    Motor:  5/5 in both arms  Her legs felt tight still, but with intact strength    Sensory:  She appreciated light touch in all limbs    Coordination:   All movements remained slow but without ataxia    Gait:  She displayed a minimal spastic paraparetic gait again    Her neurological examination was unchanged. Laboratory/Radiology:  ry/Radiology:     CBC:   Lab Results   Component Value Date    WBC 7.1 08/28/2020    RBC 4.07 08/28/2020    HGB 13.3 08/28/2020    HCT 40.1 08/28/2020    MCV 98.5 08/28/2020    MCH 32.7 08/28/2020    MCHC 33.2 08/28/2020    RDW 12.8 08/28/2020     08/28/2020    MPV 8.0 08/28/2020     CMP:    Lab Results   Component Value Date     03/13/2019    K 4.8 03/13/2019     03/13/2019    CO2 26 03/13/2019    BUN 9 03/13/2019    CREATININE 0.8 03/13/2019    GFRAA >60 03/13/2019    LABGLOM >60 03/13/2019    GLUCOSE 77 03/13/2019    CALCIUM 9.3 03/13/2019     MRI Brain with/without contrast 4/9/19 revealed moderate white matter disease in the periventricular regions and gray-white matter junctions, with no active lesions. MRI of her C-spine revealed abnormal signal in the upper cord without active disease. MRIs of her lumbosacral spine and thoracic spine displayed demyelinating lesions as well. Recent visual evoked potentials were normal.    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 Fortunately, there were never seizures. Unfortunately, her gait worsened from multiple sclerosis. She remains on Tecfidera---without additional deficits---and improvement with her AFO braces. However, she now pointed to a rash, which I doubt is from [address] after using this drug for over a year. I recommended decreasing her dosing, but she refused. She does not require baclofen at this time; she will continue with her AFO braces. She remains stable medically. Plan:     Repeat CBC with differential, CMP and HUGO virus assays were ordered. She will continue with Tecfidera; if necessary, Amy Boss will be used. I will research persistent rashes with dimethyl fumarate. She will return in 6 months; she will call at any time if problems arise. I spent 40 minutes with the patient with over 50 % spent in counseling and disease management. All patient issues were addressed and all questions were answered. TeleMedicine Patient Consent    This visit was performed as a virtual video visit using a synchronous, two-way, audio-video telehealth technology platform. Patient identification was verified at the start of the visit, including the patient's telephone number and physical location. I discussed with the patient the nature of our telehealth visits, that:     1. Due to the nature of an audio- video modality, the only components of a physical exam that could be done are the elements supported by direct observation. 2. I would evaluate the patient and recommend diagnostics and treatments based on my assessment. 3. If it was felt that the patient should be evaluated in clinic or an emergency room setting, then they would be directed there. 4. Our sessions are not being recorded and that personal health information is protected.   5. Our team would provide follow up care in person if/when the patient needs it. The patient did agree to proceed with telemedicine consultation. This video was conducted at the patient's home. Again I spent 40 minutes with the patient. This visit was completed virtually using [employer].

## 2021-01-12 ENCOUNTER — HOSPITAL ENCOUNTER (OUTPATIENT)
Dept: SPEECH THERAPY | Age: 41
Setting detail: THERAPIES SERIES
Discharge: HOME | End: 2021-01-12
Payer: COMMERCIAL

## 2021-01-12 PROCEDURE — 97130 THER IVNTJ EA ADDL 15 MIN: CPT

## 2021-01-12 PROCEDURE — 97129 THER IVNTJ 1ST 15 MIN: CPT

## 2021-01-12 NOTE — PROGRESS NOTES
30 minute individual session through doxy. me. The patient recalled most remote memory questions accurately. The pt recalled  4 words read by the SLP x 2 using a grouping strategy during an immediate memory task with 77% accuracy. She used grouping strategies with fair+ ability given verbal instruction. The pt named items in categories with fair+ ability given mild cues to increase organization. Homework ideas provided for carryover. Continue POC.     Linda Barrera M.A., [address] [facility]  Speech Pathologist  1/12/2021     [id number]  therapeutic interventions that focus on cognitive function , initial  15 min  [id number]  therapeutic interventions that focus on cognitive function, each additional 15 min

## 2021-01-19 ENCOUNTER — HOSPITAL ENCOUNTER (OUTPATIENT)
Dept: SPEECH THERAPY | Age: 41
Setting detail: THERAPIES SERIES
Discharge: HOME | End: 2021-01-19
Payer: COMMERCIAL

## 2021-01-19 PROCEDURE — 97130 THER IVNTJ EA ADDL 15 MIN: CPT

## 2021-01-19 PROCEDURE — 97129 THER IVNTJ 1ST 15 MIN: CPT

## 2021-01-19 NOTE — PROGRESS NOTES
15 minute individual session through doxy. me. as the pt logged on late. The patient recalled most remote memory questions accurately. The pt recalled details in simple level paragraphs read by the SLP during an immediate memory task with 72% accuracy. She used rehearsing strategies with fair+ ability given verbal instruction. She recalled 3/3 related words after 10 minutes during divided attention tasks. Homework ideas provided for carryover. Continue POC.     Bryan Mercer M.A., [address] [facility]  Speech Pathologist  1/19/2021     [id number]  therapeutic interventions that focus on cognitive function , initial  15 min  [id number]  therapeutic interventions that focus on cognitive function, each additional 15 min

## 2021-01-26 ENCOUNTER — HOSPITAL ENCOUNTER (OUTPATIENT)
Dept: SPEECH THERAPY | Age: 41
Setting detail: THERAPIES SERIES
Discharge: HOME | End: 2021-01-26
Payer: COMMERCIAL

## 2021-01-26 PROCEDURE — 97129 THER IVNTJ 1ST 15 MIN: CPT

## 2021-01-26 PROCEDURE — 97130 THER IVNTJ EA ADDL 15 MIN: CPT

## 2021-01-26 NOTE — PROGRESS NOTES
23 minute individual session through [employer] me. as the pt logged on a few minutes late. The patient recalled most remote memory questions accurately. The pt recalled details in moderate level paragraphs read by the SLP during an immediate memory task with 66% accuracy. She used rehearsing strategies with fair+ ability given verbal instruction. She provided verbal answers during a word deduction task with 70% accuracy given increased time. Homework ideas provided for carryover. Continue POC.     Isabell Velazco M.A., [address] [place] [address]  Speech Pathologist  1/26/2021     [id number]  therapeutic interventions that focus on cognitive function , initial  15 min  [id number]  therapeutic interventions that focus on cognitive function, each additional 15 min

## 2021-01-29 ENCOUNTER — HOSPITAL ENCOUNTER (OUTPATIENT)
Dept: SPEECH THERAPY | Age: 41
Setting detail: THERAPIES SERIES
Discharge: HOME | End: 2021-01-29
Payer: COMMERCIAL

## 2021-02-02 ENCOUNTER — HOSPITAL ENCOUNTER (OUTPATIENT)
Dept: SPEECH THERAPY | Age: 41
Setting detail: THERAPIES SERIES
Discharge: HOME | End: 2021-02-02
Payer: COMMERCIAL

## 2021-02-02 PROCEDURE — 97129 THER IVNTJ 1ST 15 MIN: CPT

## 2021-02-02 PROCEDURE — 97130 THER IVNTJ EA ADDL 15 MIN: CPT

## 2021-02-02 NOTE — PROGRESS NOTES
23 minute individual session through [employer] me. as the pt logged on a few minutes late. The patient recalled most remote memory questions accurately. The pt recalled details in simple-moderate level paragraphs read by the SLP x 1 during an immediate memory task with 38% accuracy. She recalled details in paragraphs read x 2 by the SLP with >70% accuracy. She used rehearsing strategies with fair+ ability given verbal instruction. Homework ideas provided for carryover. Continue POC.     Geoff Bustos M.A. [facility]  Speech Pathologist  2/02/2021     [id number]  therapeutic interventions that focus on cognitive function , initial  15 min  [id number]  therapeutic interventions that focus on cognitive function, each additional 15 min

## 2021-02-09 ENCOUNTER — HOSPITAL ENCOUNTER (OUTPATIENT)
Dept: SPEECH THERAPY | Age: 41
Setting detail: THERAPIES SERIES
Discharge: HOME | End: 2021-02-09
Payer: COMMERCIAL

## 2021-02-09 PROCEDURE — 97129 THER IVNTJ 1ST 15 MIN: CPT

## 2021-02-09 PROCEDURE — 97130 THER IVNTJ EA ADDL 15 MIN: CPT

## 2021-02-09 NOTE — PROGRESS NOTES
23 minute individual session through doxy. me. as the pt logged on a few minutes late. The pt recalled details in simple level paragraphs read by the SLP x 1 during an immediate memory task with 75% accuracy. She used rehearsing strategies with fair+/good ability given verbal instruction. The pt named up to 12 items/category given 1 minute and was able to provide additional answers given verbal cues for use of sub-categories. Homework ideas provided for carryover. Continue POC.     Isabell Velazco M.A., [id number] [place] [address]  Speech Pathologist  2/09/2021     [id number]  therapeutic interventions that focus on cognitive function , initial  15 min  [id number]  therapeutic interventions that focus on cognitive function, each additional 15 min

## 2021-02-10 DIAGNOSIS — G35 MULTIPLE SCLEROSIS (HCC): ICD-10-CM

## 2021-02-10 RX ORDER — DIMETHYL FUMARATE 240 MG/1
CAPSULE ORAL
Qty: 180 CAPSULE | Refills: 3 | Status: SHIPPED
Start: 2021-02-10 | End: 2021-08-11 | Stop reason: SDUPTHER

## 2021-02-16 ENCOUNTER — HOSPITAL ENCOUNTER (OUTPATIENT)
Dept: SPEECH THERAPY | Age: 41
Setting detail: THERAPIES SERIES
Discharge: HOME | End: 2021-02-16
Payer: COMMERCIAL

## 2021-02-16 PROCEDURE — 97130 THER IVNTJ EA ADDL 15 MIN: CPT

## 2021-02-16 PROCEDURE — 97129 THER IVNTJ 1ST 15 MIN: CPT

## 2021-02-16 NOTE — PROGRESS NOTES
30 minute individual session through doxy. me. The pt recalled words in sequence given 4 words read by the SLP when asked, for example, \"what was the first word, third word\" with 47% accuracy. She used rehearsing strategies with fair+/good ability given verbal instruction. The pt answered auditory processing questions containing space and time concepts e.g. \"before/after\" with 55% accuracy. Homework ideas provided for carryover. Continue POC.     Pablito Peguero M.A., [id number] [place] [address]  Speech Pathologist  2/16/2021     [id number]  therapeutic interventions that focus on cognitive function , initial  15 min  [id number]  therapeutic interventions that focus on cognitive function, each additional 15 min

## 2021-02-23 ENCOUNTER — HOSPITAL ENCOUNTER (OUTPATIENT)
Dept: SPEECH THERAPY | Age: 41
Setting detail: THERAPIES SERIES
Discharge: HOME | End: 2021-02-23
Payer: COMMERCIAL

## 2021-02-23 PROCEDURE — 97129 THER IVNTJ 1ST 15 MIN: CPT

## 2021-02-23 PROCEDURE — 97130 THER IVNTJ EA ADDL 15 MIN: CPT

## 2021-02-23 NOTE — PROGRESS NOTES
30 minute individual session through doxy. me. The pt recalled details in simple paragraphs read x 1 by the SLP with 68% accuracy. She used rehearsing and visualization strategies given mild verbal cues. Homework ideas provided for carryover. Re-evaluate next session. Continue POC.     Rajwinder Green M.A., [id number] [place] [address]  Speech Pathologist  2/23/2021     [id number]  therapeutic interventions that focus on cognitive function , initial  15 min  [id number]  therapeutic interventions that focus on cognitive function, each additional 15 min

## 2021-03-02 ENCOUNTER — HOSPITAL ENCOUNTER (OUTPATIENT)
Dept: SPEECH THERAPY | Age: 41
Setting detail: THERAPIES SERIES
Discharge: HOME | End: 2021-03-02
Payer: COMMERCIAL

## 2021-03-02 PROCEDURE — 97130 THER IVNTJ EA ADDL 15 MIN: CPT

## 2021-03-02 PROCEDURE — 97129 THER IVNTJ 1ST 15 MIN: CPT

## 2021-03-02 NOTE — PROGRESS NOTES
30 minute individual session through doxy. me. The SLP started the pt's cognitive re-evaluation. Continue POC.     Malgorzata Augustin M.A. [facility]  Speech Pathologist  3/02/2021     [id number]  therapeutic interventions that focus on cognitive function , initial  15 min  24006  therapeutic interventions that focus on cognitive function, each additional 15 min

## 2021-03-16 ENCOUNTER — HOSPITAL ENCOUNTER (OUTPATIENT)
Dept: SPEECH THERAPY | Age: 41
Setting detail: THERAPIES SERIES
Discharge: HOME | End: 2021-03-16
Payer: COMMERCIAL

## 2021-03-16 PROCEDURE — 97129 THER IVNTJ 1ST 15 MIN: CPT

## 2021-03-16 PROCEDURE — 97130 THER IVNTJ EA ADDL 15 MIN: CPT

## 2021-03-16 NOTE — PROGRESS NOTES
30 minute individual session through doxy. me. The SLP completed the pt's cognitive re-evaluation. Full report to follow. Continue POC.     Randell Serrano M.A., [address] [facility]  Speech Pathologist  3/16/2021     [id number]  therapeutic interventions that focus on cognitive function , initial  15 min  55702  therapeutic interventions that focus on cognitive function, each additional 15 min

## 2021-03-19 NOTE — PROGRESS NOTES
SPEECH/LANGUAGE PATHOLOGY   COGNITIVE EVALUATION     Patient: Toshia Perez  : 1980  Referred by: Dr. Randy Best  Diagnosis: cognitive communication deficit R41.841; pt is also diagnosed with MS    Testing was completed over 2 sessions on 3/02/2021 and 3/16/2021 virtually via [employer] at the patient's request.      Stimulus questions were obtained from the RIPA-2. Severity ratings for each subtest were determined as follows:     RAW SCORE  SEVERITY    28-30  Within functional limits    25-27  Mild deficit    22-24  Moderate deficit    19-21  Marked deficit    16-18  Severe deficit        Subtest  Raw Score    Severity    Immediate Memory    severe   Recent Memory    WFL   Temporal Orientation   (Recent Memory)    WFL   Temporal Orientation   (Remote Memory)    WFL   Spatial Orientation    mild   Orientation to Environment    Not tested-time constraints   Recall of General Information    Not tested-time constraints   Problem Solving & Abstract Reasoning    moderate   Organization    moderate   Auditory Processing   & Retention    WFL       VARIANT OBSERVATIONS:   [x] Error  [] Perseveration  [x] Repeat instructions/Stimulus  [] Denial/Refusal  [x] Delayed response  [] Confabulation  [x] Partially correct  [] Irrelevant  [] Tangential  [x] Self-corrected    THERAPY RECOMMENDATIONS:   [] Cognitive therapy is not warranted at this time.    [x] Continued cognitive therapy is recommended once/week for 60 minute sessions for 8-16 weeks with emphasis on the following:    [x] To improve immediate memory    [x] To improve recent memory    [] To improve temporal orientation (recent memory)    [x] To improve temporal orientation (remote memory)    [] To improve spatial orientation    [] To improve orientation to environment    [] To improve recall of general information    [x] To improve reasoning    [x] To improve problem solving    [x] To improve organization    [x] To improve auditory processing and retention     Comments: The pt demonstrated improvements in the following areas: immediate memory, temporal orientation (recent memory), temporal orientation (remote memory), spatial orientation, problem-solving and abstract reasoning, organization and auditory processing and retention. It is felt that she will continue to progress given consistent, weekly treatment. Patient stated goals: pt in agreement with above stated POC. Treatment goals discussed with [x] patient [] family. [x] Patient [] family understands the diagnosis, prognosis and plan of care. This plan will be re-evaluated and revised in one week if warranted. Prognosis for improvements in the above area(s) is [x] good [] fair [] guarded [] unknown at this time. EDUCATION:   Speech language pathologist (SLP) completed education with the patient regarding identified cognitive deficits and subsequent need for continued speech pathology intervention. Discussed deficit areas to be targeted by formal intervention and established short/long term goals. Reviewed compensatory strategies to improve functional outcome (as appropriate). Encouraged patient to engage SLP in structured Q&A session relative to identified deficit areas. Patient indicated understanding of all information provided via satisfactory verbal response.     Burna Babinski M.A., [address] [facility]  Speech Pathologist  3/19/2021     Patient Active Problem List   Diagnosis    Patient overweight    Dysuria    Perennial allergic rhinitis    Multiple sclerosis ([facility] [address]

## 2021-03-23 ENCOUNTER — HOSPITAL ENCOUNTER (OUTPATIENT)
Dept: SPEECH THERAPY | Age: 41
Setting detail: THERAPIES SERIES
Discharge: HOME | End: 2021-03-23
Payer: COMMERCIAL

## 2021-03-23 PROCEDURE — 97129 THER IVNTJ 1ST 15 MIN: CPT

## 2021-03-23 PROCEDURE — 97130 THER IVNTJ EA ADDL 15 MIN: CPT

## 2021-03-23 NOTE — PROGRESS NOTES
30 minute individual session through doxy. me. The pt recalled details in simple paragraphs read x 1 by the SLP with <40% accuracy. She used rehearsing and visualization strategies given mild verbal cues. The pt recalled 4 words by pairing words with 100% accuracy during an immediate memory task. She recalled 4 words by grouping into 2 categories and when asked to state, for example, the \"light\" vs.\"heavy\" items with 60% accuracy. Homework ideas provided for carryover. Continue POC.     Randell Serrano M.A., [id number] [place] [address]  Speech Pathologist  3/23/2021     [id number]  therapeutic interventions that focus on cognitive function , initial  15 min  [id number]  therapeutic interventions that focus on cognitive function, each additional 15 min

## 2021-03-30 ENCOUNTER — HOSPITAL ENCOUNTER (OUTPATIENT)
Dept: SPEECH THERAPY | Age: 41
Setting detail: THERAPIES SERIES
Discharge: HOME | End: 2021-03-30
Payer: COMMERCIAL

## 2021-03-30 PROCEDURE — 97129 THER IVNTJ 1ST 15 MIN: CPT

## 2021-03-30 PROCEDURE — 97130 THER IVNTJ EA ADDL 15 MIN: CPT

## 2021-03-30 NOTE — PROGRESS NOTES
30 minute individual session through doxy. me. The pt recalled details in simple-moderate paragraphs read x 1 by the SLP with 67% accuracy. She used rehearsing strategies given mild verbal cues. The pt recalled 4 numbers using rehearsing with 80% accuracy latently during an immediate memory task. Homework ideas provided for carryover. Continue POC.     Celia Verma M.A., [address] [facility]  Speech Pathologist  3/30/2021     [id number]  therapeutic interventions that focus on cognitive function , initial  15 min  [id number]  therapeutic interventions that focus on cognitive function, each additional 15 min

## 2021-04-06 ENCOUNTER — HOSPITAL ENCOUNTER (OUTPATIENT)
Dept: SPEECH THERAPY | Age: 41
Setting detail: THERAPIES SERIES
Discharge: HOME | End: 2021-04-06
Payer: COMMERCIAL

## 2021-04-06 PROCEDURE — 97129 THER IVNTJ 1ST 15 MIN: CPT

## 2021-04-06 PROCEDURE — 97130 THER IVNTJ EA ADDL 15 MIN: CPT

## 2021-04-06 NOTE — PROGRESS NOTES
30 minute individual session through doxy. me. The pt recalled details in simple-moderate paragraphs read x 1 by the SLP with 75% accuracy. She used rehearsing strategies as she does not feel that visualization strategies are as helpful. The pt recalled 4 numbers using rehearsing with 80% accuracy latently during an immediate memory task. She recalled 5 words using rehearsing and chunking strategies with 80% accuracy. Homework ideas provided for carryover. Continue POC.     Julieann Pallas M.A., [id number] [place] [address]  Speech Pathologist  4/06/2021     [id number]  therapeutic interventions that focus on cognitive function , initial  15 min  [id number]  therapeutic interventions that focus on cognitive function, each additional 15 min

## 2021-04-13 ENCOUNTER — APPOINTMENT (OUTPATIENT)
Dept: SPEECH THERAPY | Age: 41
End: 2021-04-13
Payer: COMMERCIAL

## 2021-04-20 ENCOUNTER — HOSPITAL ENCOUNTER (OUTPATIENT)
Dept: SPEECH THERAPY | Age: 41
Setting detail: THERAPIES SERIES
Discharge: HOME | End: 2021-04-20
Payer: COMMERCIAL

## 2021-04-20 PROCEDURE — 97129 THER IVNTJ 1ST 15 MIN: CPT

## 2021-04-20 PROCEDURE — 97130 THER IVNTJ EA ADDL 15 MIN: CPT

## 2021-04-27 ENCOUNTER — HOSPITAL ENCOUNTER (OUTPATIENT)
Dept: SPEECH THERAPY | Age: 41
Setting detail: THERAPIES SERIES
Discharge: HOME | End: 2021-04-27
Payer: COMMERCIAL

## 2021-04-27 PROCEDURE — 97130 THER IVNTJ EA ADDL 15 MIN: CPT

## 2021-04-27 PROCEDURE — 97129 THER IVNTJ 1ST 15 MIN: CPT

## 2021-05-04 ENCOUNTER — HOSPITAL ENCOUNTER (OUTPATIENT)
Dept: SPEECH THERAPY | Age: 41
Setting detail: THERAPIES SERIES
Discharge: HOME | End: 2021-05-04
Payer: COMMERCIAL

## 2021-05-04 PROCEDURE — 97130 THER IVNTJ EA ADDL 15 MIN: CPT

## 2021-05-04 PROCEDURE — 97129 THER IVNTJ 1ST 15 MIN: CPT

## 2021-05-04 NOTE — PROGRESS NOTES
Patient in office today with her mother for OV, brought Covid -23 vaccination record. Added vaccine to immunizations and scanned to chart.

## 2021-05-04 NOTE — PROGRESS NOTES
30 minute individual session through [employer] me. The pt recalled complex, functional directions read x 2 by the SLP with fair- accuracy. She used rehearsing strategies, but required max cues to use visualization strategies. The pt recalled 4 words read by the SLP x 1 given 2 categories with 75% accuracy. Homework ideas provided for carryover. Continue POC.     Enedelia Yeung M.A. [facility]  Speech Pathologist  5/04/2021     [id number]  therapeutic interventions that focus on cognitive function , initial  15 min  [id number]  therapeutic interventions that focus on cognitive function, each additional 15 min

## 2021-05-05 ENCOUNTER — APPOINTMENT (OUTPATIENT)
Dept: SPEECH THERAPY | Age: 41
End: 2021-05-05
Payer: COMMERCIAL

## 2021-05-18 ENCOUNTER — HOSPITAL ENCOUNTER (OUTPATIENT)
Dept: SPEECH THERAPY | Age: 41
Setting detail: THERAPIES SERIES
Discharge: HOME | End: 2021-05-18
Payer: COMMERCIAL

## 2021-05-18 PROCEDURE — 97130 THER IVNTJ EA ADDL 15 MIN: CPT

## 2021-05-18 PROCEDURE — 97129 THER IVNTJ 1ST 15 MIN: CPT

## 2021-05-25 ENCOUNTER — HOSPITAL ENCOUNTER (OUTPATIENT)
Dept: SPEECH THERAPY | Age: 41
Setting detail: THERAPIES SERIES
Discharge: HOME | End: 2021-05-25
Payer: COMMERCIAL

## 2021-05-25 PROCEDURE — 97130 THER IVNTJ EA ADDL 15 MIN: CPT

## 2021-05-25 PROCEDURE — 97129 THER IVNTJ 1ST 15 MIN: CPT

## 2021-05-25 NOTE — PROGRESS NOTES
30 minute individual session through [employer] me. with student intern present to observe. The pt recalled complex, functional directions read x 1 by the SLP with fair- accuracy. The pt recalled words from a field of 4 during a category exclusion task with <40% accuracy. Accuracy slightly improved with repetition. However, the virtual connection was fair today and the pt may have missed a few words read by the SLP. The pt recalled 3 words in the order in which they would normally occur with 70% accuracy given increased time. She used rehearsing strategies, but required max cues to use visualization strategies. Homework ideas provided for carryover. Continue POC.     Gissel Bess M.A.  Speech Pathologist  5/25/2021     [id number]  therapeutic interventions that focus on cognitive function , initial  15 min  12983  therapeutic interventions that focus on cognitive function, each additional 15 min

## 2021-06-01 ENCOUNTER — HOSPITAL ENCOUNTER (OUTPATIENT)
Dept: SPEECH THERAPY | Age: 41
Setting detail: THERAPIES SERIES
Discharge: HOME | End: 2021-06-01
Payer: COMMERCIAL

## 2021-06-01 PROCEDURE — 97129 THER IVNTJ 1ST 15 MIN: CPT

## 2021-06-01 PROCEDURE — 97130 THER IVNTJ EA ADDL 15 MIN: CPT

## 2021-06-02 ENCOUNTER — VIRTUAL VISIT (OUTPATIENT)
Dept: NEUROLOGY | Age: 41
End: 2021-06-02
Payer: COMMERCIAL

## 2021-06-02 ENCOUNTER — HOSPITAL ENCOUNTER (OUTPATIENT)
Age: 41
Discharge: HOME | End: 2021-06-02
Payer: COMMERCIAL

## 2021-06-02 DIAGNOSIS — G35 MULTIPLE SCLEROSIS (HCC): ICD-10-CM

## 2021-06-02 LAB
ALBUMIN SERPL-MCNC: 4.2 G/DL (ref 3.5–5.2)
ALP BLD-CCNC: 49 U/L (ref 35–104)
ALT SERPL-CCNC: 17 U/L (ref 0–32)
ANION GAP SERPL CALCULATED.3IONS-SCNC: 8 MMOL/L (ref 7–16)
AST SERPL-CCNC: 21 U/L (ref 0–31)
BASOPHILS ABSOLUTE: 0.07 E9/L (ref 0–0.2)
BASOPHILS RELATIVE PERCENT: 0.9 % (ref 0–2)
BILIRUB SERPL-MCNC: 0.2 MG/DL (ref 0–1.2)
BUN BLDV-MCNC: 14 MG/DL (ref 6–20)
CALCIUM SERPL-MCNC: 9.3 MG/DL (ref 8.6–10.2)
CHLORIDE BLD-SCNC: 104 MMOL/L (ref 98–107)
CO2: 26 MMOL/L (ref 22–29)
CREAT SERPL-MCNC: 0.8 MG/DL (ref 0.5–1)
EOSINOPHILS ABSOLUTE: 0.07 E9/L (ref 0.05–0.5)
EOSINOPHILS RELATIVE PERCENT: 0.9 % (ref 0–6)
GFR AFRICAN AMERICAN: >60
GFR NON-AFRICAN AMERICAN: >60 ML/MIN/1.73
GLUCOSE BLD-MCNC: 99 MG/DL (ref 74–99)
HCT VFR BLD CALC: 41.4 % (ref 34–48)
HEMOGLOBIN: 14.2 G/DL (ref 11.5–15.5)
IMMATURE GRANULOCYTES #: 0.03 E9/L
IMMATURE GRANULOCYTES %: 0.4 % (ref 0–5)
LYMPHOCYTES ABSOLUTE: 1.34 E9/L (ref 1.5–4)
LYMPHOCYTES RELATIVE PERCENT: 17 % (ref 20–42)
MCH RBC QN AUTO: 33.5 PG (ref 26–35)
MCHC RBC AUTO-ENTMCNC: 34.3 % (ref 32–34.5)
MCV RBC AUTO: 97.6 FL (ref 80–99.9)
MONOCYTES ABSOLUTE: 0.78 E9/L (ref 0.1–0.95)
MONOCYTES RELATIVE PERCENT: 9.9 % (ref 2–12)
NEUTROPHILS ABSOLUTE: 5.59 E9/L (ref 1.8–7.3)
NEUTROPHILS RELATIVE PERCENT: 70.9 % (ref 43–80)
PDW BLD-RTO: 12.9 FL (ref 11.5–15)
PLATELET # BLD: 344 E9/L (ref 130–450)
PMV BLD AUTO: 8.6 FL (ref 7–12)
POTASSIUM SERPL-SCNC: 4.7 MMOL/L (ref 3.5–5)
RBC # BLD: 4.24 E12/L (ref 3.5–5.5)
SODIUM BLD-SCNC: 138 MMOL/L (ref 132–146)
TOTAL PROTEIN: 7.2 G/DL (ref 6.4–8.3)
WBC # BLD: 7.9 E9/L (ref 4.5–11.5)

## 2021-06-02 PROCEDURE — 80053 COMPREHEN METABOLIC PANEL: CPT

## 2021-06-02 PROCEDURE — 99443 PR PHYS/QHP TELEPHONE EVALUATION 21-30 MIN: CPT | Performed by: PSYCHIATRY & NEUROLOGY

## 2021-06-02 PROCEDURE — 87798 DETECT AGENT NOS DNA AMP: CPT

## 2021-06-02 PROCEDURE — 85025 COMPLETE CBC W/AUTO DIFF WBC: CPT

## 2021-06-02 PROCEDURE — 36415 COLL VENOUS BLD VENIPUNCTURE: CPT

## 2021-06-02 NOTE — PROGRESS NOTES
Status: alert and oriented x 3; she displayed fair insight into her disorder  I again heard a slight spastic, ataxic dysarthria  There were no aphasias but slow responses    Cranial Nerves:  I found no cranial nerve abnormalities    Motor:  5/5 in both arms  Her legs felt tight still, but with intact strength    Sensory:  She appreciated light touch in all limbs    Coordination:   All movements remained slow without ataxia    Gait:  She displayed a minimal spastic paraparetic gait again    Her neurological examination was reportedly unchanged. Laboratory/Radiology:  ry/Radiology:     Her recent CBC with differential was unremarkable. CMP was also unrevealing. MRI brain with/without contrast 4/9/19 revealed moderate white matter disease in the periventricular regions and gray-white matter junctions, with no active lesions. MRI of her cervical spine revealed abnormal signal in the upper cord without active disease. MRIs of her lumbosacral spine and thoracic spine displayed demyelinating lesions as well. Recent visual evoked potentials were normal.  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 There were never seizures. Her gait worsened from multiple sclerosis. She remains on dimethyl fumarate without additional deficitsand improvement with her AFO braces. There was no additional rash. She does not require baclofen; she will continue with her AFO braces. She remains stable medically. Plan:     She will continue with dimethyl fumarate; if necessary, ocrelizumab will be used. Her rash was not related to dimethyl fumarate. She will return in 6 months to the office; she will call at any time if problems arise. I spent 30 minutes with the patient with over 50 % spent in counseling and disease management.   All patient issues were addressed and all questions were answered. Luke Mcdermott was a 37year old individual who was evaluated via telephone on 6/2/2021. Consent:  She and/or health care decision maker is aware that that she may receive a bill for this telephone service, depending on her insurance coverage, and has provided verbal consent to proceed--- yes. Documentation:  I communicated with the patient and/or health care decision maker about her multiple sclerosis. Details of this discussion including any medical advice provided per telephone. I affirmed this is a Patient Initiated Episode with an Established Patient who has not had a related appointment within my department in the past 7 days or scheduled within the next 24 hours. Again I spent 30 minutes with the patient.       Alfredo Youngblood MD

## 2021-06-02 NOTE — PROGRESS NOTES
Fay Ryan was read the following message We want to confirm that, for purposes of billing, this is a virtual visit with your provider for which we will submit a claim for reimbursement with your insurance company. You will be responsible for any copays, coinsurance amounts or other amounts not covered by your insurance company. If you do not accept this, unfortunately we will not be able to schedule or proceed with a virtual visit with the provider. Do you accept? Sharri Mancilla responded Yes .

## 2021-06-07 NOTE — PROGRESS NOTES
15 minute individual session through [employer]. as the pt logged on late. The pt recalled complex, functional directions read x 1 by the SLP with fair- accuracy. She used rehearsing strategies, but required max cues to use visualization strategies. The pt recalled 5 numbers read by the SLP x 1 given chunking strategies with 60% accuracy. Homework ideas provided for carryover. Continue POC.     Roman Pantoja M.A., [address] [place] [address] Pathologist  5/18/2021     [id number]  therapeutic interventions that focus on cognitive function , initial  15 min  01906  therapeutic interventions that focus on cognitive function, each additional 15 min Patient refill request        Requested Prescriptions     Pending Prescriptions Disp Refills    cyanocobalamin (Vitamin B-12) 500 mcg tablet 90 Tablet 3     Sig: Take 1 Tablet by mouth daily.

## 2021-06-08 ENCOUNTER — HOSPITAL ENCOUNTER (OUTPATIENT)
Dept: SPEECH THERAPY | Age: 41
Setting detail: THERAPIES SERIES
Discharge: HOME | End: 2021-06-08
Payer: COMMERCIAL

## 2021-06-08 PROCEDURE — 97129 THER IVNTJ 1ST 15 MIN: CPT

## 2021-06-08 PROCEDURE — 97130 THER IVNTJ EA ADDL 15 MIN: CPT

## 2021-06-08 NOTE — PROGRESS NOTES
30 minute individual session through do[employer]. me. with student intern present to observe. The pt used visualization strategies to recall complex sentences with 77% accuracy. The pt recalled specific word types in moderate to complex paragraphs given cues to listen for names, places, etc with 84% accuracy. She has been using rehearsing strategies with good ability at home. Homework ideas provided for carryover. Continue POC.     Troy Dubon M.A. [facility]  Speech Pathologist  6/8/2021     [id number]  therapeutic interventions that focus on cognitive function , initial  15 min  [id number]  therapeutic interventions that focus on cognitive function, each additional 15 min

## 2021-06-10 LAB
Lab: NORMAL
REPORT: NORMAL
THIS TEST SENT TO: NORMAL

## 2021-06-15 ENCOUNTER — HOSPITAL ENCOUNTER (OUTPATIENT)
Dept: SPEECH THERAPY | Age: 41
Setting detail: THERAPIES SERIES
Discharge: HOME | End: 2021-06-15
Payer: COMMERCIAL

## 2021-06-15 PROCEDURE — 97130 THER IVNTJ EA ADDL 15 MIN: CPT

## 2021-06-15 PROCEDURE — 97129 THER IVNTJ 1ST 15 MIN: CPT

## 2021-06-15 NOTE — PROGRESS NOTES
30 minute individual session through do[employer]. me. with student intern present to observe. The SLP started the pt's cognitive re-evaluation this session. Continue POC.     Emily Locke M.A., [address] [facility]  Speech Pathologist  6/15/2021     [id number]  therapeutic interventions that focus on cognitive function , initial  15 min  [id number]  therapeutic interventions that focus on cognitive function, each additional 15 min

## 2021-06-29 ENCOUNTER — HOSPITAL ENCOUNTER (OUTPATIENT)
Dept: SPEECH THERAPY | Age: 41
Setting detail: THERAPIES SERIES
Discharge: HOME | End: 2021-06-29
Payer: COMMERCIAL

## 2021-06-29 PROCEDURE — 97130 THER IVNTJ EA ADDL 15 MIN: CPT

## 2021-06-29 PROCEDURE — 97129 THER IVNTJ 1ST 15 MIN: CPT

## 2021-06-29 NOTE — PROGRESS NOTES
Filomena Blount [address] [place] [address]  Outpatient Speech Therapy  Phone: [phone] Fax: [phone]           SPEECH/LANGUAGE PATHOLOGY  SPEECH/LANGUAGE/COGNITIVE RE-EVALUATION   AND UPDATED PLAN OF CARE      PATIENT NAME:  Donta Hitchcock  (female)     MRN:  [id number]    :  1980  (37 y.o.)  STATUS:  Outpatient    TODAY'S DATE:  2021  REFERRING PROVIDER:    Dr. Tim Catalan   SPECIFIC PROVIDER ORDER: SLP cognitive language evaluation  Date of order:  2020  REASON FOR REFERRAL: cognitive communication deficit R41.841; pt is also diagnosed with MS    REFERRING DIAGNOSIS: Cognitive communication deficit [R41.841]    Testing was completed for the pt's re-evaluation over 2 sessions on 6/15/2021 and 2021. The has been seen virtually via doxy. me at her request.    SPEECH THERAPY  PLAN OF CARE   The speech therapy  POC is established based on physician order, speech pathology diagnosis and results of clinical assessment    Speech Pathology intervention is recommended 1-2 times/week for 6-12 weeks with emphasis on the following:      SPEECH PATHOLOGY DIAGNOSIS:    Moderate-marked cognitive deficit    Conditions Requiring Skilled Therapeutic Intervention for speech, language and/or cognition    Cognitive linguistic impairment  Decreased short term memory  Decreased problem solving skills   Decreased thought organization    Specific Speech Therapy Interventions to Include:   Compensatory strategy training  Home exercise program  Therapeutic tasks for Cognition    Specific instructions for next treatment:  immediate memory tasks with training in use of compensatory strategies    SHORT/LONG TERM GOALS  LTG: To improve memory and thought organization to complete functional tasks at home including recall of dr appointments, taking medications, etc 90% of the time. ST.  To improve immediate and recent memory to >80% accuracy using

## 2021-07-02 NOTE — PROGRESS NOTES
30 minute individual virtual session through [employer]. The pt recalled details from 2-sentence stories read by the SLP with 70% accuracy while using rehearsing strategies. She recalled the same information after 5-10 minute delay with distractions with 95% accuracy. The pt described an event e.g. packing for a trip to a big city with fair ability and moderate verbal cues. Continue POC.

## 2021-07-06 ENCOUNTER — HOSPITAL ENCOUNTER (OUTPATIENT)
Dept: SPEECH THERAPY | Age: 41
Setting detail: THERAPIES SERIES
Discharge: HOME | End: 2021-07-06
Payer: COMMERCIAL

## 2021-07-06 PROCEDURE — 97130 THER IVNTJ EA ADDL 15 MIN: CPT

## 2021-07-06 PROCEDURE — 97129 THER IVNTJ 1ST 15 MIN: CPT

## 2021-07-13 ENCOUNTER — HOSPITAL ENCOUNTER (OUTPATIENT)
Dept: SPEECH THERAPY | Age: 41
Setting detail: THERAPIES SERIES
Discharge: HOME | End: 2021-07-13
Payer: COMMERCIAL

## 2021-07-13 PROCEDURE — 97130 THER IVNTJ EA ADDL 15 MIN: CPT

## 2021-07-13 PROCEDURE — 97129 THER IVNTJ 1ST 15 MIN: CPT

## 2021-07-13 NOTE — PROGRESS NOTES
15 minute individual virtual session through [employer]. as the pt was late logging on. The pt problem-solved given various scenarios with 80% accuracy. The pt recalled details from short stories read by the SLP with fair+/good accuracy while using rehearsing strategies. Continue POC.     Lencho Carlos M.A., [address] [place] [address]  Speech Pathologist  7/13/2021

## 2021-07-20 ENCOUNTER — HOSPITAL ENCOUNTER (OUTPATIENT)
Dept: SPEECH THERAPY | Age: 41
Setting detail: THERAPIES SERIES
Discharge: HOME | End: 2021-07-20
Payer: COMMERCIAL

## 2021-07-20 PROCEDURE — 97130 THER IVNTJ EA ADDL 15 MIN: CPT

## 2021-07-20 PROCEDURE — 97129 THER IVNTJ 1ST 15 MIN: CPT

## 2021-07-20 NOTE — PROGRESS NOTES
23 minute individual virtual session through [employer]. as the pt was late logging on we had some problems with the connection. The pt stated items in specific categories e.g. 5 foods that begin with \"m\" with <50% accuracy. The SLP provided verbal encouragement and noted that the specificity made the task more difficult. The pt recalled up to 6 related words read by the SLP, but demonstrated difficulty when 2 additional words were added. The pt used rehearsing strategies with good ability. Continue POC.     Lencho Carlos M.A., [address] [facility]  Speech Pathologist  7/20/2021     [id number]  therapeutic interventions that focus on cognitive function , initial  15 min  [id number]  therapeutic interventions that focus on cognitive function, each additional 15 min

## 2021-07-27 ENCOUNTER — HOSPITAL ENCOUNTER (OUTPATIENT)
Dept: SPEECH THERAPY | Age: 41
Setting detail: THERAPIES SERIES
Discharge: HOME | End: 2021-07-27
Payer: COMMERCIAL

## 2021-07-27 NOTE — PROGRESS NOTES
The SLP called the pt as she was not logged on for her virtual speech session. Pt reported that she was not home and would need to reschedule for next week. Continue POC.     Hilliard Goodpasture M.A., Brenna Ravi  Speech Pathologist  7/27/2021

## 2021-08-03 ENCOUNTER — HOSPITAL ENCOUNTER (OUTPATIENT)
Dept: SPEECH THERAPY | Age: 41
Setting detail: THERAPIES SERIES
Discharge: HOME | End: 2021-08-03
Payer: COMMERCIAL

## 2021-08-03 PROCEDURE — 97129 THER IVNTJ 1ST 15 MIN: CPT

## 2021-08-03 PROCEDURE — 97130 THER IVNTJ EA ADDL 15 MIN: CPT

## 2021-08-03 NOTE — PROGRESS NOTES
35 minute individual virtual session through [employer]. The pt was pleasant and attentive with moderate background distractions. The pt recalled details in moderate-complex paragraphs with poor accuracy. The pt recalled details in 2-sentence paragraphs with 50% accuracy. The pt used rehearsing strategies with good ability. Continue POC.     Denice Benson M.A. [facility]  Speech Pathologist  8/03/2021     [id number]  therapeutic interventions that focus on cognitive function , initial  15 min  [id number]  therapeutic interventions that focus on cognitive function, each additional 15 min

## 2021-08-10 ENCOUNTER — HOSPITAL ENCOUNTER (OUTPATIENT)
Dept: SPEECH THERAPY | Age: 41
Setting detail: THERAPIES SERIES
Discharge: HOME | End: 2021-08-10
Payer: COMMERCIAL

## 2021-08-10 PROCEDURE — 97130 THER IVNTJ EA ADDL 15 MIN: CPT

## 2021-08-10 PROCEDURE — 97129 THER IVNTJ 1ST 15 MIN: CPT

## 2021-08-10 NOTE — PROGRESS NOTES
30 minute individual virtual session through [employer]. The pt was pleasant and attentive and engaged in conversational speech. The pt recalled details in 2-sentence paragraphs read by the SLP x 1 with 66% accuracy. The pt recalled 4 words by grouping/categorizing with 80% accuracy. The pt used rehearsing and grouping strategies with good ability during this task. Continue POC.     Jennie Snider M.A., [address] [place] [address]  Speech Pathologist  8/10/2021     [id number]  therapeutic interventions that focus on cognitive function , initial  15 min  [id number]  therapeutic interventions that focus on cognitive function, each additional 15 min

## 2021-08-11 DIAGNOSIS — G35 MULTIPLE SCLEROSIS (HCC): ICD-10-CM

## 2021-08-12 RX ORDER — DIMETHYL FUMARATE 240 MG/1
CAPSULE ORAL
Qty: 180 CAPSULE | Refills: 3 | Status: SHIPPED
Start: 2021-08-12 | End: 2022-04-21

## 2021-08-17 ENCOUNTER — HOSPITAL ENCOUNTER (OUTPATIENT)
Dept: SPEECH THERAPY | Age: 41
Setting detail: THERAPIES SERIES
Discharge: HOME | End: 2021-08-17
Payer: COMMERCIAL

## 2021-08-17 PROCEDURE — 97129 THER IVNTJ 1ST 15 MIN: CPT

## 2021-08-17 PROCEDURE — 97130 THER IVNTJ EA ADDL 15 MIN: CPT

## 2021-08-17 NOTE — PROGRESS NOTES
30 minute individual virtual session through [employer]. The pt was pleasant and attentive and engaged in conversational speech. The pt recalled 4 words by grouping/categorizing with 75% accuracy. The pt used rehearsing and grouping strategies with good ability during this task. The pt recalled 4 numbers with 100% accuracy and recalled 5 numbers with <40% accuracy using rehearsing and chunking strategies. Continue POC.     Ministerio Wells M.A., [address] [facility]  Speech Pathologist  8/17/2021     [id number]  therapeutic interventions that focus on cognitive function , initial  15 min  [id number]  therapeutic interventions that focus on cognitive function, each additional 15 min

## 2021-08-24 ENCOUNTER — HOSPITAL ENCOUNTER (OUTPATIENT)
Dept: SPEECH THERAPY | Age: 41
Setting detail: THERAPIES SERIES
Discharge: HOME | End: 2021-08-24
Payer: COMMERCIAL

## 2021-08-24 PROCEDURE — 97130 THER IVNTJ EA ADDL 15 MIN: CPT

## 2021-08-24 PROCEDURE — 97129 THER IVNTJ 1ST 15 MIN: CPT

## 2021-08-24 NOTE — PROGRESS NOTES
30 minute individual virtual session through [employer]. The pt was pleasant and attentive and engaged in conversational speech. She answered questions pertaining to temporal orientation with >90% accuracy. The pt recalled 5 numbers with 50% accuracy using rehearsing and chunking strategies. The pt answered questions given story problems to increase auditory processing with 50% accuracy. Repetition of some questions was required. Continue POC.     Val Carvajal M.A.  Speech Pathologist  8/24/2021     [id number]  therapeutic interventions that focus on cognitive function , initial  15 min  [id number]  therapeutic interventions that focus on cognitive function, each additional 15 min

## 2021-09-07 ENCOUNTER — HOSPITAL ENCOUNTER (OUTPATIENT)
Dept: SPEECH THERAPY | Age: 41
Setting detail: THERAPIES SERIES
Discharge: HOME | End: 2021-09-07
Payer: COMMERCIAL

## 2021-09-07 PROCEDURE — 97130 THER IVNTJ EA ADDL 15 MIN: CPT

## 2021-09-07 PROCEDURE — 97129 THER IVNTJ 1ST 15 MIN: CPT

## 2021-09-07 NOTE — PROGRESS NOTES
30 minute individual virtual session through [employer]. The pt was pleasant and attentive and engaged in conversational speech. She answered questions pertaining to temporal orientation with >90% accuracy. The pt recalled details in complex sentences read by the SLP with 72% accuracy given cues to use rehearsing and visualization. Repetition of some questions was required. Continue POC.     Mendy Guerra M.A., [address] [facility]  Speech Pathologist  9/7/2021     [id number]  therapeutic interventions that focus on cognitive function , initial  15 min  87068  therapeutic interventions that focus on cognitive function, each additional 15 min

## 2021-09-14 ENCOUNTER — HOSPITAL ENCOUNTER (OUTPATIENT)
Dept: SPEECH THERAPY | Age: 41
Setting detail: THERAPIES SERIES
Discharge: HOME | End: 2021-09-14
Payer: COMMERCIAL

## 2021-09-14 PROCEDURE — 97129 THER IVNTJ 1ST 15 MIN: CPT

## 2021-09-14 PROCEDURE — 97130 THER IVNTJ EA ADDL 15 MIN: CPT

## 2021-09-14 NOTE — PROGRESS NOTES
30 minute individual virtual session through [employer]. The pt was pleasant and attentive and engaged in conversational speech. She answered questions pertaining to temporal orientation with >90% accuracy. The pt recalled details in moderate level paragraphs read by the SLP with 85% accuracy given cues to use rehearsing and visualization. She recalled at least 3/4 words on most occasions when given 4 words to categorize into 2 groups. She used rehearsing strategies with minimal verbal cues. Continue POC.     Aaron Tamayo M.A. [facility]  Speech Pathologist  9/14/2021     16977  therapeutic interventions that focus on cognitive function , initial  15 min  80098  therapeutic interventions that focus on cognitive function, each additional 15 min

## 2021-09-21 ENCOUNTER — HOSPITAL ENCOUNTER (OUTPATIENT)
Dept: SPEECH THERAPY | Age: 41
Setting detail: THERAPIES SERIES
Discharge: HOME | End: 2021-09-21
Payer: COMMERCIAL

## 2021-09-21 PROCEDURE — 97129 THER IVNTJ 1ST 15 MIN: CPT

## 2021-09-21 PROCEDURE — 97130 THER IVNTJ EA ADDL 15 MIN: CPT

## 2021-09-21 NOTE — PROGRESS NOTES
30 minute individual virtual session through [employer]. The pt was pleasant and attentive and engaged in conversational speech. The pt recalled details in moderate-complex level paragraphs read by the SLP x 1 with 62% accuracy given cues to use rehearsing and visualization. She recalled at least 3/4 words on most occasions when given 4 words to categorize into 2 groups. She used rehearsing strategies with minimal verbal cues. Re-evaluate next session. Continue POC.     Nemo Hairston M.A., [id number] [place] [address]  Speech Pathologist  9/21/2021     [id number]  therapeutic interventions that focus on cognitive function , initial  15 min  47307  therapeutic interventions that focus on cognitive function, each additional 15 min

## 2021-09-28 ENCOUNTER — HOSPITAL ENCOUNTER (OUTPATIENT)
Dept: SPEECH THERAPY | Age: 41
Setting detail: THERAPIES SERIES
Discharge: HOME | End: 2021-09-28
Payer: COMMERCIAL

## 2021-09-28 PROCEDURE — 97129 THER IVNTJ 1ST 15 MIN: CPT

## 2021-09-28 PROCEDURE — 97130 THER IVNTJ EA ADDL 15 MIN: CPT

## 2021-09-29 ENCOUNTER — APPOINTMENT (OUTPATIENT)
Dept: SPEECH THERAPY | Age: 41
End: 2021-09-29
Payer: COMMERCIAL

## 2021-10-05 ENCOUNTER — HOSPITAL ENCOUNTER (OUTPATIENT)
Dept: SPEECH THERAPY | Age: 41
Setting detail: THERAPIES SERIES
Discharge: HOME | End: 2021-10-05
Payer: COMMERCIAL

## 2021-10-05 PROCEDURE — 97129 THER IVNTJ 1ST 15 MIN: CPT

## 2021-10-05 PROCEDURE — 97130 THER IVNTJ EA ADDL 15 MIN: CPT

## 2021-10-05 NOTE — PROGRESS NOTES
30 minute individual virtual session through [employer]. The pt was pleasant and attentive and engaged in conversational speech. The SLP continued and completed testing for the pt's cognitive re-evaluation via the RIPA-2. Full report to follow.     Tuan Taylor M.A., [facility]  Speech Pathologist  10/05/2021     90076  therapeutic interventions that focus on cognitive function , initial  15 min  [id number]  therapeutic interventions that focus on cognitive function, each additional 15 min

## 2021-10-05 NOTE — PROGRESS NOTES
Nik العلي [address] [place] [address]  Outpatient Speech Therapy  Phone: [phone] Fax: [phone]           SPEECH/LANGUAGE PATHOLOGY  SPEECH/LANGUAGE/COGNITIVE RE-EVALUATION   AND DISCHARGE SUMMARY      PATIENT NAME:  Mary Stern  (female)     MRN:  [id number]    :  1980  (37 y.o.)  STATUS:  Outpatient    TODAY'S DATE:  10/5/2021  REFERRING PROVIDER:    Dr. Nyasia Perez   SPECIFIC PROVIDER ORDER: SLP cognitive language evaluation  Date of order:  2020  REASON FOR REFERRAL: cognitive communication deficit R41.841; pt is also diagnosed with MS    REFERRING DIAGNOSIS: Cognitive communication deficit [R41.841]    Testing was completed for the pt's re-evaluation over 2 sessions on 2021 and 10/5/2021. The has been seen virtually via doxy. me at her request.    SPEECH THERAPY  PLAN OF CARE   The speech therapy  POC is established based on physician order, speech pathology diagnosis and results of clinical assessment    Continued speech pathology intervention is not recommended as it is felt that the pt's progress has reached a plateau. This was discussed with the patient. A packet of memory exercises will be sent to the patient. If you have any questions, please do not hesitate to call me at [phone]. SPEECH PATHOLOGY DIAGNOSIS:    Moderate-marked cognitive deficit    Conditions Requiring Skilled Therapeutic Intervention for speech, language and/or cognition  N/A    Specific Speech Therapy Interventions to Include:              N/A      Specific instructions for next treatment: N/A    Stimulus questions were obtained from the RIPA-2.  Severity ratings for each subtest were determined as follows:         Subtest Raw Score Percentile Standard Score Severity          Immediate Memory   11 5 5 severe   Recent Memory   26 63 11 moderate   Temporal Orientation  (Recent Memory) 29 84 13 moderate   Temporal Orientation  (Remote Memory) 26 48 10 marked   Spatial Orientation   24 37 9 marked   Orientation to Environment Not tested- for hospital ADL's N/A N/A N/A   Recall of General Information Not tested N/A N/A N/A   Problem Solving & Abstract Reasoning 23 50 10 marked   Organization   22 63 11 moderate   Auditory Processing & Retention 25 50 10 marked       VARIANT OBSERVATIONS     Present Absent   Error (e) x    Perseveration (p) x    Repeat Instructions/Stimulus (r) x    Denial/Refusal (d)  x   Delayed Response (dl) x    Confabulation (c)  x   Partially Correct (pc) x    Irrelevant (i)  x   Tangential (t)  x   Self-corrected (sc) x          Patient stated goals: Agreed with above                   CLINICAL OBSERVATIONS NOTED DURING THE EVALUATION  Latent responses, Perseveration errors, Anomic errors, and Cueing was required                  EDUCATION:   The Speech Language Pathologist (SLP) completed education regarding results of evaluation and that intervention is warranted at this time. Learner: Patient  Education: Reviewed results and recommendations of this evaluation  Evaluation of Education:  Yoselyn Kraus understanding    This plan may be re-evaluated and revised as warranted. Treatment goals discussed with Patient   The Patient understand(s) the diagnosis, prognosis and plan of care     Evaluation Time includes thorough review of current medical information, gathering information on past medical history/social history and prior level of function, completion of standardized testing/informal observation of tasks, assessment of data and education on plan of care and goals. CPT code:    71913  therapeutic interventions that focus on cognitive function , initial  15 min  28029  therapeutic interventions that focus on cognitive function, each additional 15 min     The admitting diagnosis and active problem list, as listed below have been reviewed prior to initiation of this evaluation.         ACTIVE PROBLEM LIST:   Patient Active Problem List   Diagnosis    Patient overweight    Dysuria    Perennial allergic rhinitis    Multiple sclerosis ([facility] [address]       Severa Magnus M.A., [address] [facility]  Speech Pathologist  10/6/2021

## 2021-10-12 ENCOUNTER — TELEPHONE (OUTPATIENT)
Dept: NEUROLOGY | Age: 41
End: 2021-10-12

## 2021-10-12 DIAGNOSIS — R41.841 COGNITIVE COMMUNICATION DEFICIT: Primary | ICD-10-CM

## 2021-10-12 NOTE — TELEPHONE ENCOUNTER
Speech therapy need a new Rx for patient. Please send Epic.   Electronically signed by Ewa Doran MA on 10/12/21 at 1:19 PM EDT

## 2021-10-19 ENCOUNTER — APPOINTMENT (OUTPATIENT)
Dept: SPEECH THERAPY | Age: 41
End: 2021-10-19
Payer: COMMERCIAL

## 2021-10-26 ENCOUNTER — APPOINTMENT (OUTPATIENT)
Dept: SPEECH THERAPY | Age: 41
End: 2021-10-26
Payer: COMMERCIAL

## 2021-11-11 ENCOUNTER — OFFICE VISIT (OUTPATIENT)
Dept: FAMILY MEDICINE CLINIC | Age: 41
End: 2021-11-11
Payer: COMMERCIAL

## 2021-11-11 VITALS
OXYGEN SATURATION: 99 % | WEIGHT: 164 LBS | HEART RATE: 66 BPM | HEIGHT: 67 IN | DIASTOLIC BLOOD PRESSURE: 62 MMHG | RESPIRATION RATE: 16 BRPM | TEMPERATURE: 98.2 F | SYSTOLIC BLOOD PRESSURE: 95 MMHG | BODY MASS INDEX: 25.74 KG/M2

## 2021-11-11 DIAGNOSIS — Z00.00 HEALTHCARE MAINTENANCE: ICD-10-CM

## 2021-11-11 DIAGNOSIS — L68.0 HIRSUTISM: ICD-10-CM

## 2021-11-11 DIAGNOSIS — E66.3 PATIENT OVERWEIGHT: ICD-10-CM

## 2021-11-11 DIAGNOSIS — Z23 NEED FOR INFLUENZA VACCINATION: Primary | ICD-10-CM

## 2021-11-11 DIAGNOSIS — J30.89 PERENNIAL ALLERGIC RHINITIS: ICD-10-CM

## 2021-11-11 PROCEDURE — G8427 DOCREV CUR MEDS BY ELIG CLIN: HCPCS | Performed by: FAMILY MEDICINE

## 2021-11-11 PROCEDURE — 99212 OFFICE O/P EST SF 10 MIN: CPT | Performed by: FAMILY MEDICINE

## 2021-11-11 PROCEDURE — 1036F TOBACCO NON-USER: CPT | Performed by: FAMILY MEDICINE

## 2021-11-11 PROCEDURE — G8482 FLU IMMUNIZE ORDER/ADMIN: HCPCS | Performed by: FAMILY MEDICINE

## 2021-11-11 PROCEDURE — G8419 CALC BMI OUT NRM PARAM NOF/U: HCPCS | Performed by: FAMILY MEDICINE

## 2021-11-11 PROCEDURE — 99213 OFFICE O/P EST LOW 20 MIN: CPT | Performed by: FAMILY MEDICINE

## 2021-11-11 RX ORDER — FLUTICASONE PROPIONATE 50 MCG
2 SPRAY, SUSPENSION (ML) NASAL DAILY
Qty: 1 EACH | Refills: 2 | Status: SHIPPED | OUTPATIENT
Start: 2021-11-11

## 2021-11-11 SDOH — ECONOMIC STABILITY: FOOD INSECURITY: WITHIN THE PAST 12 MONTHS, THE FOOD YOU BOUGHT JUST DIDN'T LAST AND YOU DIDN'T HAVE MONEY TO GET MORE.: NEVER TRUE

## 2021-11-11 SDOH — ECONOMIC STABILITY: FOOD INSECURITY: WITHIN THE PAST 12 MONTHS, YOU WORRIED THAT YOUR FOOD WOULD RUN OUT BEFORE YOU GOT MONEY TO BUY MORE.: NEVER TRUE

## 2021-11-11 ASSESSMENT — PATIENT HEALTH QUESTIONNAIRE - PHQ9
4. FEELING TIRED OR HAVING LITTLE ENERGY: 1
5. POOR APPETITE OR OVEREATING: 0
2. FEELING DOWN, DEPRESSED OR HOPELESS: 3
1. LITTLE INTEREST OR PLEASURE IN DOING THINGS: 0
10. IF YOU CHECKED OFF ANY PROBLEMS, HOW DIFFICULT HAVE THESE PROBLEMS MADE IT FOR YOU TO DO YOUR WORK, TAKE CARE OF THINGS AT HOME, OR GET ALONG WITH OTHER PEOPLE: 1
SUM OF ALL RESPONSES TO PHQ QUESTIONS 1-9: 5
9. THOUGHTS THAT YOU WOULD BE BETTER OFF DEAD, OR OF HURTING YOURSELF: 0
SUM OF ALL RESPONSES TO PHQ9 QUESTIONS 1 & 2: 3
8. MOVING OR SPEAKING SO SLOWLY THAT OTHER PEOPLE COULD HAVE NOTICED. OR THE OPPOSITE, BEING SO FIGETY OR RESTLESS THAT YOU HAVE BEEN MOVING AROUND A LOT MORE THAN USUAL: 0
6. FEELING BAD ABOUT YOURSELF - OR THAT YOU ARE A FAILURE OR HAVE LET YOURSELF OR YOUR FAMILY DOWN: 1
SUM OF ALL RESPONSES TO PHQ QUESTIONS 1-9: 5
3. TROUBLE FALLING OR STAYING ASLEEP: 0
SUM OF ALL RESPONSES TO PHQ QUESTIONS 1-9: 5
7. TROUBLE CONCENTRATING ON THINGS, SUCH AS READING THE NEWSPAPER OR WATCHING TELEVISION: 0

## 2021-11-11 ASSESSMENT — SOCIAL DETERMINANTS OF HEALTH (SDOH): HOW HARD IS IT FOR YOU TO PAY FOR THE VERY BASICS LIKE FOOD, HOUSING, MEDICAL CARE, AND HEATING?: NOT HARD AT ALL

## 2021-11-11 NOTE — PATIENT INSTRUCTIONS
[facility]  [address] [facility], [address] [place] [address]  [phone]  Call and make an appointment with a female doctor

## 2021-11-11 NOTE — PROGRESS NOTES
Vaccine Information Sheet, \"Influenza - Inactivated\"  given to Alison Titus, or parent/legal guardian of  Alison Titus and verbalized understanding. Patient responses:    Have you ever had a reaction to a flu vaccine? No  Do you have any current illness? No  Have you ever had Guillian Lindale Syndrome? No  Do you have a serious allergy to any of the follow: Neomycin, Polymyxin, Thimerosal, eggs or egg products? No    Flu vaccine given per order. Please see immunization tab. Risks and benefits explained. Current VIS given.

## 2021-11-11 NOTE — PROGRESS NOTES
45-year-old female patient, history of MS followed by neurology, developmental delay. In office today for general checkup, complaint of ear pain/blood. Ear pain/bloodpatient stated she first noticed it yesterday, she does insert items and objects into the ear canal such as Q-tips. Denies any change in hearing, tinnitus, balance issues. Denies any regular drainage from the ear. Patient currently denies alcohol use, illicit drug usage marijuana or cocaine. Is not sexually active. History of MS followed by neurology, maintained on Tecfidera. Did also note hirsutism on exam.  Patient did endorse regular periods last LMP 1 month prior. Notes her menses Occurs every 28 to 30 days. She endorsed hirsutism located on the chin and upper lip. Also like to be referred to OB/GYN. Unsure if she has previously endorses complaint, labs at that time WNL including TSH, CBC, CMP. Blood pressure 95/62, pulse 66, temperature 98.2 °F (36.8 °C), temperature source Temporal, resp. rate 16, height 5' 7\" (1.702 m), weight 164 lb (74.4 kg), last menstrual period 10/11/2021, SpO2 99 %, not currently breastfeeding. HEENT WNL     Heart regular    Lungs clear    abd non-tender      No edema    Pulses intact    A/P  Ear pain/most likely secondary to irritation due to recurrent use of Q-tips. Advised patient to refrain from inserting any objects into the ears. Follow-up closely, advised patient to return if new symptoms develop such as hearing changes, tinnitus or balance issues. MScontinue to follow-up with neurology    Hirsutismconsider endocrine abnormalities and work-up. Has previously been worked up for similar findings, with CBC, CMP C, TSH.  TSH at that time is within normal limits. We will put in for repeat TSH draw  A.m. cortisol. Flu vaccine today    Provided information for OB/GYN.     Attending Physician Statement  I have discussed the case, including pertinent history and exam findings with the resident. I agree with the documented assessment and plan.

## 2021-11-12 ENCOUNTER — TELEPHONE (OUTPATIENT)
Dept: FAMILY MEDICINE CLINIC | Age: 41
End: 2021-11-12

## 2021-11-12 NOTE — TELEPHONE ENCOUNTER
Jos Longo called in and is stating that Nai Box told her that you were going to sed her in some sort of powder for her bowels she only received flonase.     Please advise   thank you

## 2021-11-14 ASSESSMENT — ENCOUNTER SYMPTOMS
ABDOMINAL PAIN: 0
COLOR CHANGE: 0
VOMITING: 0
COUGH: 0
WHEEZING: 0
DIARRHEA: 0
BACK PAIN: 0
SHORTNESS OF BREATH: 0

## 2021-11-14 NOTE — PROGRESS NOTES
[facility] [facility]  Department of Family Medicine  Family Medicine Residency Program      Patient:  Ursula Pinedo 37 y.o. female     Date of Service: 11/14/21      Chiefcomplaint:   Chief Complaint   Patient presents with    Otalgia     bilateral     Multiple Sclerosis    Annual Exam         History of Present Illness     45-year-old female patient, history of MS followed by neurology, developmental delay. In office today for general checkup, complaint of ear pain/blood.     Ear pain/bloodpatient stated she first noticed it yesterday, she does insert items and objects into the ear canal such as Q-tips. Denies any change in hearing, tinnitus, balance issues. Denies any regular drainage from the ear.     Patient currently denies alcohol use, illicit drug usage marijuana or cocaine. Is not sexually active. History of MS followed by neurology, maintained on Tecfidera.     Did also note hirsutism on exam.  Patient did endorse regular periods last LMP 1 month prior. Notes her menses Occurs every 28 to 30 days. She endorsed hirsutism located on the chin and upper lip. Also like to be referred to OB/GYN. Unsure if she has previously endorses complaint, labs at that time WNL including TSH, CBC, CMP. Past Medical History:      Diagnosis Date    Chronic back pain     Delayed growth and development     Heel spur     Right    STD (female)     unknown as per pt; at Dr Audra Rutherford       Past Surgical History:        Procedure Laterality Date    FOOT SURGERY      bone spur, Dr Gillian Nunes:    Patient has no known allergies.     Social History:   Social History     Socioeconomic History    Marital status: Single     Spouse name: Not on file    Number of children: Not on file    Years of education: Not on file    Highest education level: Not on file   Occupational History    Occupation: unemployed   Tobacco Use    Smoking status: Never Smoker    Smokeless tobacco: Never Used Vaping Use    Vaping Use: Never used   Substance and Sexual Activity    Alcohol use: No    Drug use: No    Sexual activity: Not Currently   Other Topics Concern    Not on file   Social History Narrative    Not on file     Social Determinants of Health     Financial Resource Strain: Low Risk     Difficulty of Paying Living Expenses: Not hard at all   Food Insecurity: No Food Insecurity    Worried About Running Out of Food in the Last Year: Never true    Prasanth of Food in the Last Year: Never true   Transportation Needs:     Lack of Transportation (Medical): Not on file    Lack of Transportation (Non-Medical):  Not on file   Physical Activity:     Days of Exercise per Week: Not on file    Minutes of Exercise per Session: Not on file   Stress:     Feeling of Stress : Not on file   Social Connections:     Frequency of Communication with Friends and Family: Not on file    Frequency of Social Gatherings with Friends and Family: Not on file    Attends Judaism Services: Not on file    Active Member of [address] [place] [address] or Organizations: Not on file    Attends Club or Organization Meetings: Not on file    Marital Status: Not on file   Intimate Partner Violence:     Fear of Current or Ex-Partner: Not on file    Emotionally Abused: Not on file    Physically Abused: Not on file    Sexually Abused: Not on file   Housing Stability:     Unable to Pay for Housing in the Last Year: Not on file    Number of [place] in the Last Year: Not on file    Unstable Housing in the Last Year: Not on file        Family History:       Problem Relation Age of Onset    Diabetes Mother     Hypertension Mother     High Cholesterol Mother     Diabetes Maternal Grandmother     Diabetes Maternal Grandfather     Cancer Maternal Grandfather         lymphoma     Hypertension Brother     High Cholesterol Brother     Cancer Maternal Aunt         breast cancer diagnosed 41yo       Review of Systems:   Review of Systems Constitutional: Negative for chills and fever. Respiratory: Negative for cough, shortness of breath and wheezing. Cardiovascular: Negative for chest pain. Gastrointestinal: Negative for abdominal pain, diarrhea and vomiting. Musculoskeletal: Negative for back pain and neck pain. Skin: Negative for color change. Neurological: Negative for dizziness, tremors, weakness and light-headedness. Medication List:    Current Outpatient Medications   Medication Sig Dispense Refill    fluticasone (FLONASE) 50 MCG/ACT nasal spray 2 sprays by Nasal route daily 1 each 2    TECFIDERA 240 MG delayed release capsule TAKE 1 CAPSULE BY MOUTH  TWICE DAILY 180 capsule 3    Multiple Vitamins-Minerals (THERAPEUTIC MULTIVITAMIN-MINERALS) tablet Take 1 tablet by mouth daily      aspirin 81 MG tablet Take 81 mg by mouth daily        No current facility-administered medications for this visit. Physical Exam   Physical Exam  Constitutional:       Appearance: She is well-developed. HENT:      Head: Normocephalic. Right Ear: External ear normal.      Left Ear: External ear normal.      Ears:      Comments: Minimal cerumen bilaterally  Dry skin throughout canals bilaterally       Mouth/Throat:      Comments: Hirsutism on chin, upper lips  Eyes:      Conjunctiva/sclera: Conjunctivae normal.      Pupils: Pupils are equal, round, and reactive to light. Cardiovascular:      Rate and Rhythm: Normal rate and regular rhythm. Heart sounds: Normal heart sounds. No murmur heard. Pulmonary:      Effort: Pulmonary effort is normal. No respiratory distress. Breath sounds: Normal breath sounds. No wheezing or rales. Abdominal:      General: There is no distension. Palpations: Abdomen is soft. Tenderness: There is no abdominal tenderness. There is no guarding or rebound. Musculoskeletal:         General: Normal range of motion. Cervical back: Normal range of motion.    Skin:     General: Skin is warm. Findings: No erythema. Neurological:      Mental Status: She is alert and oriented to person, place, and time. Psychiatric:         Behavior: Behavior normal.         Vitals:    11/11/21 0929   BP: 95/62   Pulse: 66   Resp: 16   Temp: 98.2 °F (36.8 °C)   SpO2: 99%         Assessment and Plan     Ear pain/most likely secondary to irritation due to recurrent use of Q-tips. Advised patient to refrain from inserting any objects into the ears. Follow-up closely, advised patient to return if new symptoms develop such as hearing changes, tinnitus or balance issues.     MScontinue to follow-up with neurology     [facility] and [facility]-up. Has previously been worked up for similar findings, with CBC, CMP C, TSH.  TSH at that time is within normal limits. We will put in for repeat TSH draw  We will plan for 8 AM cortisol draw  Also consider endocrinology referral       Flu vaccine today     Provided information for OB/GYN.     RTO 1 month  Case discussed with Dr. Lon Arguello

## 2021-11-16 RX ORDER — POLYETHYLENE GLYCOL 3350 17 G/17G
17 POWDER, FOR SOLUTION ORAL DAILY
Qty: 510 G | Refills: 0 | Status: SHIPPED | OUTPATIENT
Start: 2021-11-16 | End: 2021-12-16

## 2021-11-19 ENCOUNTER — NURSE ONLY (OUTPATIENT)
Dept: FAMILY MEDICINE CLINIC | Age: 41
End: 2021-11-19
Payer: COMMERCIAL

## 2021-11-19 DIAGNOSIS — E66.3 PATIENT OVERWEIGHT: ICD-10-CM

## 2021-11-19 DIAGNOSIS — Z00.00 HEALTHCARE MAINTENANCE: ICD-10-CM

## 2021-11-19 DIAGNOSIS — L68.0 HIRSUTISM: ICD-10-CM

## 2021-11-19 LAB
CHOLESTEROL, TOTAL: 203 MG/DL (ref 0–199)
CORTISOL TOTAL: 14.59 MCG/DL (ref 2.68–18.4)
HDLC SERPL-MCNC: 104 MG/DL
LDL CHOLESTEROL CALCULATED: 92 MG/DL (ref 0–99)
TRIGL SERPL-MCNC: 35 MG/DL (ref 0–149)
VLDLC SERPL CALC-MCNC: 7 MG/DL

## 2021-11-19 PROCEDURE — 36415 COLL VENOUS BLD VENIPUNCTURE: CPT | Performed by: FAMILY MEDICINE

## 2021-11-22 ENCOUNTER — TELEPHONE (OUTPATIENT)
Dept: FAMILY MEDICINE CLINIC | Age: 41
End: 2021-11-22

## 2021-11-22 DIAGNOSIS — E66.3 PATIENT OVERWEIGHT: Primary | ICD-10-CM

## 2021-11-22 DIAGNOSIS — E78.00 ELEVATED LDL CHOLESTEROL LEVEL: ICD-10-CM

## 2021-11-22 NOTE — TELEPHONE ENCOUNTER
I called patient regarding lipid panel. Discussed behavior modifications, exercising, cutting down carbohydrates to help with cholesterol reduction.     Asked for referral to nutritionist

## 2021-11-30 ENCOUNTER — APPOINTMENT (OUTPATIENT)
Dept: SPEECH THERAPY | Age: 41
End: 2021-11-30
Payer: COMMERCIAL

## 2021-12-03 ENCOUNTER — IMMUNIZATION (OUTPATIENT)
Dept: PRIMARY CARE CLINIC | Age: 41
End: 2021-12-03
Payer: COMMERCIAL

## 2021-12-03 PROCEDURE — 91300 COVID-19, PFIZER VACCINE 30MCG/0.3ML DOSE: CPT | Performed by: NURSE PRACTITIONER

## 2021-12-03 PROCEDURE — 0004A COVID-19, PFIZER VACCINE 30MCG/0.3ML DOSE: CPT | Performed by: NURSE PRACTITIONER

## 2021-12-07 ENCOUNTER — HOSPITAL ENCOUNTER (OUTPATIENT)
Dept: SPEECH THERAPY | Age: 41
Setting detail: THERAPIES SERIES
Discharge: HOME | End: 2021-12-07
Payer: COMMERCIAL

## 2021-12-07 PROCEDURE — 96125 COGNITIVE TEST BY HC PRO: CPT

## 2021-12-07 NOTE — PROGRESS NOTES
[address] [place] [address]  Outpatient Speech Therapy  Phone: [phone] Fax: [phone]       SPEECH/LANGUAGE PATHOLOGY  SPEECH/LANGUAGE/COGNITIVE EVALUATION         PATIENT NAME:  Patti Barrera  (female)     MRN:  [id number]    :  1980  (37 y.o.)  STATUS:  Outpatient    TODAY'S DATE:  2021  REFERRING PROVIDER:    Dr. Izaiah Daugherty   SPECIFIC PROVIDER ORDER: SLP cognitive language evaluation  Date of order:  2021  REASON FOR REFERRAL: cognitive communication deficit R41.841; pt is also diagnosed with MS    REFERRING DIAGNOSIS: Cognitive communication deficit [R41.841]    The pt was seen for a cognitive evaluation following a referral from Dr. Izaiah Daugherty. The evaluation was completed virtually via doxy. me at her request.  She was previously seen for cognitive therapy virtually through [facility] from 2020 until 10/5/2021.       SPEECH THERAPY  PLAN OF CARE   The speech therapy  POC is established based on physician order, speech pathology diagnosis and results of clinical assessment    Speech Pathology intervention is recommended 1 time/week for 6-12 weeks with emphasis on the following:      SPEECH PATHOLOGY DIAGNOSIS:    Moderate-marked cognitive deficit    Conditions Requiring Skilled Therapeutic Intervention for speech, language and/or cognition    Cognitive linguistic impairment  Decreased short term memory  Decreased problem solving skills   Decreased thought organization  Decreased auditory processing    Specific Speech Therapy Interventions to Include:   Compensatory strategy training  Home exercise program  Therapeutic tasks for Cognition    Specific instructions for next treatment:  immediate memory tasks with training in use of compensatory strategies    SHORT/LONG TERM GOALS  LTG: To improve memory, thought organization and problem-solving to complete functional tasks including recall of dr appointments, taking medications, etc 80% of the time. ST. To improve immediate and recent memory to >80% accuracy using compensatory strategies such as rehearsing, grouping and chunking   To improve temporal orientation (remote memory) to >80% accuracy using sequencing strategies. To improve reasoning/problem-solving to >80% accuracy    To improve problem-solving and thought organization for daily tasks to >80% accuracy   To improve auditory processing and retention to >80% accuracy. To provide patient education and tasks to increase carryover to home once/week. Stimulus questions were obtained from the RIPA-2. Severity ratings for each subtest were determined as follows:         Subtest Raw Score Percentile Standard Score Severity          Immediate Memory   12 5 5 severe   Recent Memory   28 84 13 moderate   Temporal Orientation  (Recent Memory) 30 91 14 Mild-WFL   Temporal Orientation  (Remote Memory) 25 50 10 marked   Spatial Orientation   26 37 9 marked   Orientation to Environment Not tested- for hospital ADL's N/A N/A N/A   Recall of General Information Not tested N/A N/A N/A   Problem Solving & Abstract Reasoning 18 25 8 severe   Organization   25 84 13 moderate   Auditory Processing & Retention 28 75 12 moderate       VARIANT OBSERVATIONS     Present Absent   Error (e) x    Perseveration (p) x    Repeat Instructions/Stimulus (r) x    Denial/Refusal (d)  x   Delayed Response (dl) x    Confabulation (c)  x   Partially Correct (pc) x    Irrelevant (i)  x   Tangential (t) x    Self-corrected (sc) x          Patient stated goals: Agreed with above,     Rehabilitation Potential/Prognosis: good                  CLINICAL OBSERVATIONS NOTED DURING THE EVALUATION  Latent responses, Perseveration errors, Anomic errors, and Cueing was required                  EDUCATION:   The Speech Language Pathologist (SLP) completed education regarding results of evaluation and that intervention is warranted at this time.   Learner: Patient  Education: Reviewed results and recommendations of this evaluation  Evaluation of Education:  Lieutenant Granados understanding    This plan may be re-evaluated and revised as warranted. Treatment goals discussed with Patient   The Patient understand(s) the diagnosis, prognosis and plan of care     Evaluation Time includes thorough review of current medical information, gathering information on past medical history/social history and prior level of function, completion of standardized testing/informal observation of tasks, assessment of data and education on plan of care and goals. CPT code:    75783  eval speech sound lang comprehension      The admitting diagnosis and active problem list, as listed below have been reviewed prior to initiation of this evaluation. ACTIVE PROBLEM LIST:   Patient Active Problem List   Diagnosis    Patient overweight    Dysuria    Perennial allergic rhinitis    Multiple sclerosis ([facility]       Areli Mack M.A., [address] [place] [address] Pathologist  12/8/2021     Pastor [address].     Phone: [phone]     If you have any questions or concerns, please don't hesitate to call. Thank you for your referral.    Physician/Provider Signature:________________________________Date:__________________  By signing above, the therapists plan is approved by the physician/provider.

## 2022-01-11 NOTE — PROGRESS NOTES
45 minute individual session through doxy. me. with student intern present to observe. The pt recalled 4 words for placement e.g \"what was the 3rd word, 4th word\" with 40% accuracy. Accuracy slightly improved with repetition. The pt also recalled 3 words by placement with 40% accuracy. She recalled 4/4 related words with 35% accuracy, but recalled at least 3/4 words on most occasions. She recalled 3/3 items during a delayed recall task after 10 minutes and after 20 minutes. She named at least 15 items in categories with mild cues given increased time. She used rehearsing strategies, but required max cues to use visualization or association strategies. Homework ideas provided for carryover. Continue POC.     Allison Galdamez M.A., [address] [facility]  Speech Pathologist  6/1/2021     [id number]  therapeutic interventions that focus on cognitive function , initial  15 min  95266  therapeutic interventions that focus on cognitive function, each additional 15 min  (2)
54

## 2022-02-08 ENCOUNTER — HOSPITAL ENCOUNTER (OUTPATIENT)
Dept: SPEECH THERAPY | Age: 42
Setting detail: THERAPIES SERIES
Discharge: HOME | End: 2022-02-08
Payer: COMMERCIAL

## 2022-02-08 PROCEDURE — 97129 THER IVNTJ 1ST 15 MIN: CPT

## 2022-02-08 PROCEDURE — 97130 THER IVNTJ EA ADDL 15 MIN: CPT

## 2022-02-15 ENCOUNTER — HOSPITAL ENCOUNTER (OUTPATIENT)
Dept: SPEECH THERAPY | Age: 42
Setting detail: THERAPIES SERIES
Discharge: HOME | End: 2022-02-15
Payer: COMMERCIAL

## 2022-02-15 PROCEDURE — 97129 THER IVNTJ 1ST 15 MIN: CPT

## 2022-02-15 PROCEDURE — 97130 THER IVNTJ EA ADDL 15 MIN: CPT

## 2022-02-15 NOTE — PROGRESS NOTES
30 minute individual virtual session through [employer]. The pt was pleasant and attentive and engaged in conversational speech. She recalled details from 9-word sentences with 64% accuracy given cues to rehearse and to concentrate on most important details. She recalled 3 words during a word placement task with 70% accuracy. She used rehearsing strategies with minimal verbal cues. Continue POC.     Kinjal Hedrick M.A. [facility]  Speech Pathologist  2/15/2022    [id number]  therapeutic interventions that focus on cognitive function , initial  15 min  74597  therapeutic interventions that focus on cognitive function, each additional 15 min

## 2022-02-22 ENCOUNTER — HOSPITAL ENCOUNTER (OUTPATIENT)
Dept: SPEECH THERAPY | Age: 42
Setting detail: THERAPIES SERIES
Discharge: HOME | End: 2022-02-22
Payer: COMMERCIAL

## 2022-02-22 NOTE — PROGRESS NOTES
The SLP called the pt as she did not show for her virtual speech session, but pt's voice mail was not set up. Continue POC.     Valerio Young M.A., [place] [facility]  Speech Pathologist  2/22/2022

## 2022-03-01 ENCOUNTER — HOSPITAL ENCOUNTER (OUTPATIENT)
Dept: SPEECH THERAPY | Age: 42
Setting detail: THERAPIES SERIES
Discharge: HOME | End: 2022-03-01
Payer: COMMERCIAL

## 2022-03-01 PROCEDURE — 97129 THER IVNTJ 1ST 15 MIN: CPT

## 2022-03-01 PROCEDURE — 97130 THER IVNTJ EA ADDL 15 MIN: CPT

## 2022-03-01 NOTE — PROGRESS NOTES
30 minute individual virtual session through [employer]. The pt was pleasant and attentive and engaged in conversational speech. Given 4 words read by the SLP, the pt recalled the word described e.g. \"which one is the softest?\" with 70% accuracy. She used rehearsing strategies with minimal verbal cues, but reported that she used a categorization strategy which helped her recall the words. The patient recalled details in moderately complex paragraphs read by the SLP x 1 with 78% accuracy. Continue POC.     Judy Curry M.A., [id number] [place] [address]  Speech Pathologist  3/01/2022    09263  therapeutic interventions that focus on cognitive function , initial  15 min  14975  therapeutic interventions that focus on cognitive function, each additional 15 min

## 2022-03-08 ENCOUNTER — HOSPITAL ENCOUNTER (OUTPATIENT)
Dept: SPEECH THERAPY | Age: 42
Setting detail: THERAPIES SERIES
Discharge: HOME | End: 2022-03-08
Payer: COMMERCIAL

## 2022-03-08 PROCEDURE — 97129 THER IVNTJ 1ST 15 MIN: CPT

## 2022-03-08 PROCEDURE — 97130 THER IVNTJ EA ADDL 15 MIN: CPT

## 2022-03-08 NOTE — PROGRESS NOTES
30 minute individual virtual session through [employer]. The pt was pleasant and attentive and engaged in conversational speech. Given 4 words read by the SLP, the pt recalled the word described e.g. \"which one is the softest?\" with 75% accuracy. She used rehearsing strategies with minimal verbal cues, but reported that she used a categorization strategy which helped her recall the words. The patient recalled details in moderately complex paragraphs read by the SLP x 1 with 67% accuracy using rehearsing. Continue POC.     Jung Rodriguez M.A., [address] [place] [address]  Speech Pathologist  3/08/2022    67567  therapeutic interventions that focus on cognitive function , initial  15 min  47075  therapeutic interventions that focus on cognitive function, each additional 15 min

## 2022-03-15 ENCOUNTER — HOSPITAL ENCOUNTER (OUTPATIENT)
Dept: SPEECH THERAPY | Age: 42
Setting detail: THERAPIES SERIES
Discharge: HOME | End: 2022-03-15
Payer: COMMERCIAL

## 2022-03-15 PROCEDURE — 97130 THER IVNTJ EA ADDL 15 MIN: CPT

## 2022-03-15 PROCEDURE — 97129 THER IVNTJ 1ST 15 MIN: CPT

## 2022-03-15 NOTE — PROGRESS NOTES
Lay Mcadams evaluated through a synchronous (real-time) audio-video encounter. The patient (or guardian if applicable) is aware that this is a billable service, which includes applicable co-pays. This virtual visit was conducted with patient's (and/or legal guardian's) consent. The visit was conducted pursuant to the emergency declaration under the [address] [place] [address] and the [place] [employer] and [place] General Act. Patient identification was verified, and a caregiver was present when appropriate. The patient was located in a state where the provider was licensed to provide care. 30 minute individual virtual session through [employer]. The pt was pleasant and attentive and engaged in conversational speech. She used rehearsing strategies with minimal verbal cues. The patient recalled details in moderately complex paragraphs read by the SLP x 1 with 63% accuracy using rehearsing. She sequenced 3 words in the order in which they would normally occur with 75% accuracy. Continue POC.     Rebeca Benitez M.A., [address] [facility]  Speech Pathologist  3/15/2022    22204  therapeutic interventions that focus on cognitive function , initial  15 min  09526  therapeutic interventions that focus on cognitive function, each additional 15 min

## 2022-03-22 ENCOUNTER — HOSPITAL ENCOUNTER (OUTPATIENT)
Dept: SPEECH THERAPY | Age: 42
Setting detail: THERAPIES SERIES
Discharge: HOME | End: 2022-03-22
Payer: COMMERCIAL

## 2022-03-22 PROCEDURE — 97129 THER IVNTJ 1ST 15 MIN: CPT

## 2022-03-22 PROCEDURE — 97130 THER IVNTJ EA ADDL 15 MIN: CPT

## 2022-03-22 NOTE — PROGRESS NOTES
Lay Mcadams evaluated through a synchronous (real-time) audio-video encounter. The patient (or guardian if applicable) is aware that this is a billable service, which includes applicable co-pays. This virtual visit was conducted with patient's (and/or legal guardian's) consent. The visit was conducted pursuant to the emergency declaration under the [address] [place] [address] and the [place] [employer] and [employer] General Act. Patient identification was verified, and a caregiver was present when appropriate. The patient was located in a state where the provider was licensed to provide care. 30 minute individual virtual session through [employer]. The pt was pleasant and attentive and engaged in conversational speech. She used rehearsing strategies with minimal verbal cues. She sequenced 3 words in the order in which they would normally occur with 80% accuracy. She sequenced 4 words in the order in which they would normally occur with poor accuracy on first attempt, but with repetition then accuracy was improved. The pt recalled 4 words read by the SLP x 1 by grouping into 2 categories with 60% accuracy. Continue POC.     Troy Romano M.A. [facility]  Speech Pathologist  3/22/2022    01438  therapeutic interventions that focus on cognitive function , initial  15 min  32488  therapeutic interventions that focus on cognitive function, each additional 15 min

## 2022-03-29 ENCOUNTER — HOSPITAL ENCOUNTER (OUTPATIENT)
Dept: SPEECH THERAPY | Age: 42
Setting detail: THERAPIES SERIES
Discharge: HOME | End: 2022-03-29
Payer: COMMERCIAL

## 2022-03-29 PROCEDURE — 97130 THER IVNTJ EA ADDL 15 MIN: CPT

## 2022-03-29 PROCEDURE — 97129 THER IVNTJ 1ST 15 MIN: CPT

## 2022-03-29 NOTE — PROGRESS NOTES
Lay Mcadams evaluated through a synchronous (real-time) audio-video encounter. The patient (or guardian if applicable) is aware that this is a billable service, which includes applicable co-pays. This virtual visit was conducted with patient's (and/or legal guardian's) consent. The visit was conducted pursuant to the emergency declaration under the [address] [place] [address] and the [place] [employer] and [employer] General Act. Patient identification was verified, and a caregiver was present when appropriate. The patient was located in a state where the provider was licensed to provide care. 30 minute individual virtual session through [employer]. The pt was pleasant and attentive and engaged in conversational speech. She used rehearsing strategies with minimal verbal cues. She recalled 4/4 words in order with poor accuracy on first attempt, but with repetition was able to recall the words out of sequence with 94% accuracy. The pt recalled complex directions containing 3-4 elements read x 2 by the SLP with 25% accuracy. Continue POC.     Mini Martinez M.A., [address] [facility]  Speech Pathologist  3/29/2022    21602  therapeutic interventions that focus on cognitive function , initial  15 min  54661  therapeutic interventions that focus on cognitive function, each additional 15 min

## 2022-04-05 ENCOUNTER — HOSPITAL ENCOUNTER (OUTPATIENT)
Dept: SPEECH THERAPY | Age: 42
Setting detail: THERAPIES SERIES
Discharge: HOME | End: 2022-04-05
Payer: COMMERCIAL

## 2022-04-05 PROCEDURE — 97130 THER IVNTJ EA ADDL 15 MIN: CPT

## 2022-04-05 PROCEDURE — 97129 THER IVNTJ 1ST 15 MIN: CPT

## 2022-04-05 NOTE — PROGRESS NOTES
Lay Mcadams evaluated through a synchronous (real-time) audio-video encounter. The patient (or guardian if applicable) is aware that this is a billable service, which includes applicable co-pays. This virtual visit was conducted with patient's (and/or legal guardian's) consent. The visit was conducted pursuant to the emergency declaration under the [address] [place] [address] and the [place] [employer] and [employer] General Act. Patient identification was verified, and a caregiver was present when appropriate. The patient was located in a state where the provider was licensed to provide care. 30 minute individual virtual session through [employer]. The pt was pleasant and attentive and engaged in conversational speech. She used rehearsing strategies with minimal verbal cues. She used categorization and associations with good ability independently. She recalled 4/4 words in order with poor accuracy, but with repetition was able to recall the words out of sequence with 60% accuracy. The pt recalled 3/3 related words with >90% accuracy during an immediate memory task . She recalled 3/3 words during delayed recall tasks on 2/2 occasions. The pt recalled complex sentences with 40% accuracy. Continue POC.     Ck Choe M.A.[facility]  Speech Pathologist  4/05/2022    27601  therapeutic interventions that focus on cognitive function , initial  15 min  55959  therapeutic interventions that focus on cognitive function, each additional 15 min

## 2022-04-07 ENCOUNTER — OFFICE VISIT (OUTPATIENT)
Dept: FAMILY MEDICINE CLINIC | Age: 42
End: 2022-04-07
Payer: COMMERCIAL

## 2022-04-07 VITALS
HEART RATE: 72 BPM | SYSTOLIC BLOOD PRESSURE: 102 MMHG | RESPIRATION RATE: 18 BRPM | OXYGEN SATURATION: 100 % | WEIGHT: 165.8 LBS | BODY MASS INDEX: 26.02 KG/M2 | DIASTOLIC BLOOD PRESSURE: 68 MMHG | TEMPERATURE: 98.2 F | HEIGHT: 67 IN

## 2022-04-07 DIAGNOSIS — R53.83 OTHER FATIGUE: ICD-10-CM

## 2022-04-07 DIAGNOSIS — L68.0 HIRSUTISM: ICD-10-CM

## 2022-04-07 DIAGNOSIS — L68.0 HIRSUTISM: Primary | ICD-10-CM

## 2022-04-07 DIAGNOSIS — G35 MULTIPLE SCLEROSIS (HCC): ICD-10-CM

## 2022-04-07 DIAGNOSIS — R45.86 MOOD SWING: ICD-10-CM

## 2022-04-07 LAB
ALBUMIN SERPL-MCNC: 4.3 G/DL (ref 3.5–5.2)
ALP BLD-CCNC: 50 U/L (ref 35–104)
ALT SERPL-CCNC: 9 U/L (ref 0–32)
ANION GAP SERPL CALCULATED.3IONS-SCNC: 9 MMOL/L (ref 7–16)
AST SERPL-CCNC: 16 U/L (ref 0–31)
BASOPHILS ABSOLUTE: 0.04 E9/L (ref 0–0.2)
BASOPHILS RELATIVE PERCENT: 0.5 % (ref 0–2)
BILIRUB SERPL-MCNC: 0.3 MG/DL (ref 0–1.2)
BUN BLDV-MCNC: 9 MG/DL (ref 6–20)
CALCIUM SERPL-MCNC: 9.1 MG/DL (ref 8.6–10.2)
CHLORIDE BLD-SCNC: 104 MMOL/L (ref 98–107)
CO2: 25 MMOL/L (ref 22–29)
CREAT SERPL-MCNC: 0.7 MG/DL (ref 0.5–1)
EOSINOPHILS ABSOLUTE: 0.07 E9/L (ref 0.05–0.5)
EOSINOPHILS RELATIVE PERCENT: 0.9 % (ref 0–6)
GFR AFRICAN AMERICAN: >60
GFR NON-AFRICAN AMERICAN: >60 ML/MIN/1.73
GLUCOSE BLD-MCNC: 78 MG/DL (ref 74–99)
HCT VFR BLD CALC: 40.4 % (ref 34–48)
HEMOGLOBIN: 13 G/DL (ref 11.5–15.5)
IMMATURE GRANULOCYTES #: 0.03 E9/L
IMMATURE GRANULOCYTES %: 0.4 % (ref 0–5)
LYMPHOCYTES ABSOLUTE: 1.09 E9/L (ref 1.5–4)
LYMPHOCYTES RELATIVE PERCENT: 14.5 % (ref 20–42)
MCH RBC QN AUTO: 32.7 PG (ref 26–35)
MCHC RBC AUTO-ENTMCNC: 32.2 % (ref 32–34.5)
MCV RBC AUTO: 101.8 FL (ref 80–99.9)
MONOCYTES ABSOLUTE: 0.66 E9/L (ref 0.1–0.95)
MONOCYTES RELATIVE PERCENT: 8.8 % (ref 2–12)
NEUTROPHILS ABSOLUTE: 5.64 E9/L (ref 1.8–7.3)
NEUTROPHILS RELATIVE PERCENT: 74.9 % (ref 43–80)
PDW BLD-RTO: 13 FL (ref 11.5–15)
PLATELET # BLD: 349 E9/L (ref 130–450)
PMV BLD AUTO: 9.6 FL (ref 7–12)
POTASSIUM SERPL-SCNC: 4.8 MMOL/L (ref 3.5–5)
RBC # BLD: 3.97 E12/L (ref 3.5–5.5)
SODIUM BLD-SCNC: 138 MMOL/L (ref 132–146)
TOTAL PROTEIN: 7.3 G/DL (ref 6.4–8.3)
TSH SERPL DL<=0.05 MIU/L-ACNC: 0.8 UIU/ML (ref 0.27–4.2)
WBC # BLD: 7.5 E9/L (ref 4.5–11.5)

## 2022-04-07 PROCEDURE — 99213 OFFICE O/P EST LOW 20 MIN: CPT | Performed by: FAMILY MEDICINE

## 2022-04-07 PROCEDURE — 36415 COLL VENOUS BLD VENIPUNCTURE: CPT | Performed by: FAMILY MEDICINE

## 2022-04-07 PROCEDURE — G8419 CALC BMI OUT NRM PARAM NOF/U: HCPCS | Performed by: FAMILY MEDICINE

## 2022-04-07 PROCEDURE — G8427 DOCREV CUR MEDS BY ELIG CLIN: HCPCS | Performed by: FAMILY MEDICINE

## 2022-04-07 PROCEDURE — 1036F TOBACCO NON-USER: CPT | Performed by: FAMILY MEDICINE

## 2022-04-07 PROCEDURE — 99212 OFFICE O/P EST SF 10 MIN: CPT | Performed by: FAMILY MEDICINE

## 2022-04-07 RX ORDER — SERTRALINE HYDROCHLORIDE 25 MG/1
25 TABLET, FILM COATED ORAL DAILY
Qty: 30 TABLET | Refills: 1 | Status: SHIPPED
Start: 2022-04-07 | End: 2022-08-01

## 2022-04-07 ASSESSMENT — LIFESTYLE VARIABLES: HOW OFTEN DO YOU HAVE A DRINK CONTAINING ALCOHOL: NEVER

## 2022-04-07 ASSESSMENT — PATIENT HEALTH QUESTIONNAIRE - PHQ9
2. FEELING DOWN, DEPRESSED OR HOPELESS: 0
SUM OF ALL RESPONSES TO PHQ QUESTIONS 1-9: 0
1. LITTLE INTEREST OR PLEASURE IN DOING THINGS: 0
SUM OF ALL RESPONSES TO PHQ9 QUESTIONS 1 & 2: 0
SUM OF ALL RESPONSES TO PHQ QUESTIONS 1-9: 0

## 2022-04-07 NOTE — PROGRESS NOTES
[facility]  Department of Family Medicine  Family Medicine Residency Program      Patient:  Danny Plascencia 40 y.o. female     Date of Service: 4/7/22      Chiefcomplaint:   Chief Complaint   Patient presents with    Check-Up     R hand shakes, mood swings         History of Present Illness     This is a 29-year-old female in office regarding complaints of mood swings. Patient has a history of MS diagnosis in 2019. She follows with neurology and is currently maintained on Tecfidera. Patient notes is mostly symptoms present for several years. She does not recall any inciting events originally to be precipitated these events. She does not recall any events mostly that worsened this with signs or symptoms. She currently does deny history of anxiety or depression. She maintains good sleep hygiene, sleeps adequately per night. Maintains interest in her activities. No anhedonia. No SI/HI. Does endorse occasional anxiety which she worries about multiple things. It does not keep her awake at night. She does no regular menstrual periods, LMP 1 month ago. She notes her mood seems to get worse around the time of her menstruation.     No known previous history of thyroid disease per patient. She currently denies any changes in skin, nails, new rashes. She does endorse hair thinning over the past 2 years. No change in bowel or bladder habits. No change in swallowing. Allergies:    Patient has no known allergies.     Medication List:    Current Outpatient Medications   Medication Sig Dispense Refill    sertraline (ZOLOFT) 25 MG tablet Take 1 tablet by mouth daily 30 tablet 1    fluticasone (FLONASE) 50 MCG/ACT nasal spray 2 sprays by Nasal route daily 1 each 2    TECFIDERA 240 MG delayed release capsule TAKE 1 CAPSULE BY MOUTH  TWICE DAILY 180 capsule 3    Multiple Vitamins-Minerals (THERAPEUTIC MULTIVITAMIN-MINERALS) tablet Take 1 tablet by mouth daily      aspirin 81 MG tablet Take 81 mg by mouth daily        No current facility-administered medications for this visit. Physical Exam   Physical Exam  Constitutional:       Appearance: She is well-developed. HENT:      Head: Normocephalic. Right Ear: External ear normal.      Left Ear: External ear normal.   Eyes:      Conjunctiva/sclera: Conjunctivae normal.      Pupils: Pupils are equal, round, and reactive to light. Neck:      Comments: No thyromegaly, nodules, goiter. Cardiovascular:      Rate and Rhythm: Normal rate and regular rhythm. Heart sounds: Normal heart sounds. No murmur heard. Pulmonary:      Effort: Pulmonary effort is normal. No respiratory distress. Breath sounds: Normal breath sounds. No wheezing or rales. Abdominal:      General: There is no distension. Palpations: Abdomen is soft. Tenderness: There is no abdominal tenderness. There is no guarding or rebound. Musculoskeletal:         General: Normal range of motion. Cervical back: Normal range of motion. Skin:     General: Skin is warm. Findings: No erythema. Neurological:      Mental Status: She is alert and oriented to person, place, and time. Psychiatric:         Behavior: Behavior normal.         Vitals:    04/07/22 1047   BP: 102/68   Pulse: 72   Resp: 18   Temp: 98.2 °F (36.8 °C)   SpO2: 100%         Assessment and Plan     1. Mood swings  -Differential diagnosis the current time, given history of hair thinning, mood swings consider thyroid etiology. As patient does also have a history of MS, signs of anxiety. It is possible that she may have underlying depression/anxiety. May benefit from SSRI medication, psychotherapy.  -Discussed addition of SSRI with patient such as Zoloft and adequate trials for 6 weeks.   Patient is agreeable at this time to begin medication.  -Advised to follow-up with counseling/CBT for further care as well and to provide additional augmentation.  -We will obtain labs including CBC, CMP, TSH to rule out medical or physiological etiology.     Health maintenance  -Due for Pap smear, patient to schedule.       RTO 1 month  Case discussed with Dr. Juan C Nielson

## 2022-04-07 NOTE — PROGRESS NOTES
This is a 52-year-old female in office regarding complaints of mood swings. Patient has a history of MS diagnosis in 2019. She follows with neurology and is currently maintained on Tecfidera. Patient notes is mostly symptoms present for several years. She does not recall any inciting events originally to be precipitated these events. She does not recall any events mostly that worsened this with signs or symptoms. She currently does deny history of anxiety or depression. She maintains good sleep hygiene, sleeps adequately per night. Maintains interest in her activities. No anhedonia. No SI/HI. Does endorse occasional anxiety which she worries about multiple things. It does not keep her awake at night. She does no regular menstrual periods, LMP 1 month ago. She notes her mood seems to get worse around the time of her menstruation. No known previous history of thyroid disease per patient. She currently denies any changes in skin, nails, new rashes. She does endorse hair thinning over the past 2 years. No change in bowel or bladder habits. No change in swallowing. Blood pressure 102/68, pulse 72, temperature 98.2 °F (36.8 °C), temperature source Temporal, resp. rate 18, height 5' 7\" (1.702 m), weight 165 lb 12.8 oz (75.2 kg), last menstrual period 03/22/2022, SpO2 100 %, not currently breastfeeding. HEENT WNL     Heart regular    Lungs clear    abd non-tender      No edema    Pulses intact   No thyromegaly, nodules, goiter. A/P    Mood swings  -Differential diagnosis the current time, given history of hair thinning, mood swings consider thyroid etiology. As patient does also have a history of MS, signs of anxiety. It is possible that she may have underlying depression/anxiety. May benefit from SSRI medication, psychotherapy.  -Discussed addition of SSRI with patient such as Zoloft and adequate trials for 6 weeks.   Patient is agreeable at this time to begin medication.  -Advised to follow-up with counseling/CBT for further care as well and to provide additional augmentation.  -We will obtain labs including CBC, CMP, TSH to rule out medical or physiological etiology. Health maintenance  -Due for Pap smear, patient to schedule. Attending Physician Statement  I have discussed the case, including pertinent history and exam findings with the resident. I agree with the documented assessment and plan.

## 2022-04-12 ENCOUNTER — HOSPITAL ENCOUNTER (OUTPATIENT)
Dept: SPEECH THERAPY | Age: 42
Setting detail: THERAPIES SERIES
Discharge: HOME | End: 2022-04-12
Payer: COMMERCIAL

## 2022-04-12 PROCEDURE — 97129 THER IVNTJ 1ST 15 MIN: CPT

## 2022-04-12 NOTE — PROGRESS NOTES
Lay Mcadams evaluated through a synchronous (real-time) audio-video encounter. The patient (or guardian if applicable) is aware that this is a billable service, which includes applicable co-pays. This virtual visit was conducted with patient's (and/or legal guardian's) consent. The visit was conducted pursuant to the emergency declaration under the [address] [place] [address] authority and the [employer] and [facility] General Act. Patient identification was verified, and a caregiver was present when appropriate. The patient was located in a state where the provider was licensed to provide care. 20 minute individual virtual session as the pt was a few minutes late logging on. The session was completed through [employer]. We attempted to complete the session via the [facility] dmaien, however, the pt did not have sound. The pt was pleasant and attentive and engaged in conversational speech. The SLP started testing for the pt's cognitive re-evaluation via the RIPA-2. Continue POC.     Shailesh Goncalves M.A., [address] [facility]  Speech Pathologist  4/12/2022    [id number]  therapeutic interventions that focus on cognitive function , initial  15 min  11050  therapeutic interventions that focus on cognitive function, each additional 15 min

## 2022-04-19 ENCOUNTER — HOSPITAL ENCOUNTER (OUTPATIENT)
Dept: SPEECH THERAPY | Age: 42
Setting detail: THERAPIES SERIES
Discharge: HOME | End: 2022-04-19
Payer: COMMERCIAL

## 2022-04-19 PROCEDURE — 97129 THER IVNTJ 1ST 15 MIN: CPT

## 2022-04-19 PROCEDURE — 97130 THER IVNTJ EA ADDL 15 MIN: CPT

## 2022-04-19 NOTE — PROGRESS NOTES
Lay Mcadams evaluated through a synchronous (real-time) audio-video encounter. The patient (or guardian if applicable) is aware that this is a billable service, which includes applicable co-pays. This virtual visit was conducted with patient's (and/or legal guardian's) consent. The visit was conducted pursuant to the emergency declaration under the [address] [place] [address] and the [employer] and [employer] General Act. Patient identification was verified, and a caregiver was present when appropriate. The patient was located in a state where the provider was licensed to provide care. 30 minute individual virtual session. The session was completed through the [facility] damien. The pt was pleasant and attentive and engaged in conversational speech. The SLP continued and completed testing for the pt's cognitive re-evaluation via the RIPA-2. Full report to follow. Continue POC.     Nancy Jerry M.A., [address] [facility]  Speech Pathologist  4/19/2022    19917  therapeutic interventions that focus on cognitive function , initial  15 min  20607  therapeutic interventions that focus on cognitive function, each additional 15 min

## 2022-04-20 DIAGNOSIS — G35 MULTIPLE SCLEROSIS (HCC): ICD-10-CM

## 2022-04-20 NOTE — PROGRESS NOTES
memory, thought organization and problem-solving to complete functional tasks including recall of dr appointments, taking medications, etc 80% of the time. ST. To improve immediate and recent memory to >80% accuracy using compensatory strategies such as rehearsing, grouping and chunking   To improve temporal orientation (remote memory) to >80% accuracy using sequencing strategies. To improve reasoning/problem-solving to >80% accuracy    To improve problem-solving and thought organization for daily tasks to >80% accuracy   To improve auditory processing and retention to >80% accuracy. To provide patient education and tasks to increase carryover to home once/week. Stimulus questions were obtained from the RIPA-2. Severity ratings for each subtest were determined as follows:         Subtest Raw Score Percentile Standard Score Severity          Immediate Memory   14 9 6 severe   Recent Memory   28 84 13 moderate   Temporal Orientation  (Recent Memory) 30 75 12 Moderate   Temporal Orientation  (Remote Memory) 26 50 10 marked   Spatial Orientation   22 25 8 Severe   Orientation to Environment Not tested- for hospital ADL's N/A N/A N/A   Recall of General Information Not tested N/A N/A N/A   Problem Solving & Abstract Reasoning 20 37 9 Marked   Organization   25 84 13 moderate   Auditory Processing & Retention 24 37 9 marked       VARIANT OBSERVATIONS     Present Absent   Error (e) x    Perseveration (p) x    Repeat Instructions/Stimulus (r) x    Denial/Refusal (d)  x   Delayed Response (dl) x    Confabulation (c)  x   Partially Correct (pc) x    Irrelevant (i)  x   Tangential (t) x    Self-corrected (sc) x      Comments: The pt has demonstrated mildly improved ability to implement rehearsing, visualizing and grouping strategies during therapy sessions. She reports that she is beginning to use these memory strategies with tasks of daily living.   It is felt that the pt will benefit from continued cognitive therapy. Patient stated goals: Agreed with above     Rehabilitation Potential/Prognosis: good                  CLINICAL OBSERVATIONS NOTED DURING THE EVALUATION  Latent responses, Perseveration errors, Anomic errors, and Cueing was required                  EDUCATION:   The Speech Language Pathologist (SLP) completed education regarding results of evaluation and that intervention is warranted at this time. Learner: Patient  Education: Reviewed results and recommendations of this evaluation  Evaluation of Education:  Gillian Castaneda understanding    This plan may be re-evaluated and revised as warranted. Treatment goals discussed with Patient   The Patient understand(s) the diagnosis, prognosis and plan of care     Evaluation Time includes thorough review of current medical information, gathering information on past medical history/social history and prior level of function, completion of standardized testing/informal observation of tasks, assessment of data and education on plan of care and goals. CPT code:    86460  eval speech sound lang comprehension      The admitting diagnosis and active problem list, as listed below have been reviewed prior to initiation of this evaluation. ACTIVE PROBLEM LIST:   Patient Active Problem List   Diagnosis    Patient overweight    Dysuria    Perennial allergic rhinitis    [facility]       Lencho Carlos M.A. [facility]-SL[facility]  Speech Pathologist  4/20/2022     [address]     Phone: [phone]     If you have any questions or concerns, please don't hesitate to call. Thank you for your referral.    Physician/Provider Signature:________________________________Date:__________________  By signing above, the therapists plan is approved by the physician/provider.

## 2022-04-21 RX ORDER — DIMETHYL FUMARATE 240 MG/1
CAPSULE ORAL
Qty: 180 CAPSULE | Refills: 3 | Status: ON HOLD
Start: 2022-04-21 | End: 2022-07-28 | Stop reason: SDUPTHER

## 2022-04-26 ENCOUNTER — HOSPITAL ENCOUNTER (OUTPATIENT)
Dept: SPEECH THERAPY | Age: 42
Setting detail: THERAPIES SERIES
Discharge: HOME | End: 2022-04-26
Payer: COMMERCIAL

## 2022-04-26 PROCEDURE — 97129 THER IVNTJ 1ST 15 MIN: CPT

## 2022-04-26 PROCEDURE — 97130 THER IVNTJ EA ADDL 15 MIN: CPT

## 2022-04-26 NOTE — PROGRESS NOTES
Lay Mcadams evaluated through a synchronous (real-time) audio-video encounter. The patient (or guardian if applicable) is aware that this is a billable service, which includes applicable co-pays. This virtual visit was conducted with patient's (and/or legal guardian's) consent. The visit was conducted pursuant to the emergency declaration under the [address] [place] [address] and the [place] and [employer] General Act. Patient identification was verified, and a caregiver was present when appropriate. The patient was located in a state where the provider was licensed to provide care. 30 minute individual virtual session through [employer]. The pt was pleasant and attentive and engaged in conversational speech. The patient recalled details in moderate-complex paragraphs read x 1 by the SLP with >70% accuracy. She used rehearsing strategies with minimal verbal cues during this task. She required moderate verbal cues to name items in categories. However, given sub-categories, she was able to name 14 items in 1 minute. Ideas for carryover to home were provided. Continue POC.     Linnea Hua M.A., [address] [facility]  Speech Pathologist  4/26/2022    60184  therapeutic interventions that focus on cognitive function , initial  15 min  24883  therapeutic interventions that focus on cognitive function, each additional 15 min

## 2022-05-02 ENCOUNTER — HOSPITAL ENCOUNTER (OUTPATIENT)
Dept: SPEECH THERAPY | Age: 42
Setting detail: THERAPIES SERIES
Discharge: HOME | End: 2022-05-02
Payer: COMMERCIAL

## 2022-05-02 PROCEDURE — 97129 THER IVNTJ 1ST 15 MIN: CPT

## 2022-05-02 NOTE — PROGRESS NOTES
Lay Mcadams evaluated through a synchronous (real-time) audio-video encounter. The patient (or guardian if applicable) is aware that this is a billable service, which includes applicable co-pays. This virtual visit was conducted with patient's (and/or legal guardian's) consent. The visit was conducted pursuant to the emergency declaration under the [address] [place] [address] and the [employer] and [employer] General Act. Patient identification was verified, and a caregiver was present when appropriate. The patient was located in a state where the provider was licensed to provide care. 20 minute individual virtual session through [employer]. Pt was a few minutes late logging on for the session. The pt was pleasant and attentive and engaged in conversational speech. The patient recalled details in intermediate level paragraphs read x 1 by the SLP with 60% accuracy. She used rehearsing strategies with minimal verbal cues during this task. Ideas for carryover to home were provided. Continue POC.     Indy Garcia M.A., [address] [facility]  Speech Pathologist  5/02/2022    78645  therapeutic interventions that focus on cognitive function , initial  15 min

## 2022-05-17 ENCOUNTER — HOSPITAL ENCOUNTER (OUTPATIENT)
Dept: SPEECH THERAPY | Age: 42
Setting detail: THERAPIES SERIES
Discharge: HOME | End: 2022-05-17
Payer: COMMERCIAL

## 2022-05-17 PROCEDURE — 97129 THER IVNTJ 1ST 15 MIN: CPT

## 2022-05-17 PROCEDURE — 97130 THER IVNTJ EA ADDL 15 MIN: CPT

## 2022-05-17 NOTE — PROGRESS NOTES
Lay Mcadams evaluated through a synchronous (real-time) audio-video encounter. The patient (or guardian if applicable) is aware that this is a billable service, which includes applicable co-pays. This virtual visit was conducted with patient's (and/or legal guardian's) consent. The visit was conducted pursuant to the emergency declaration under the [address] [place] [address] and the [place] [employer] and [employer] General Act. Patient identification was verified, and a caregiver was present when appropriate. The patient was located in a state where the provider was licensed to provide care. 23 minute individual virtual session through [employer]. The pt was pleasant and attentive and engaged in conversational speech. The patient recalled details in intermediate level paragraphs read x 1 by the SLP with 67% accuracy. She recalled information about the same paragraphs during a recent memory task with 100% accuracy. She used rehearsing strategies with minimal verbal cues during this task. Ideas for carryover to home were provided. Continue POC.     Lencho Carlos M.A., [address] [facility]  Speech Pathologist  5/17/2022    86422  therapeutic interventions that focus on cognitive function , initial  15 min  90904  therapeutic interventions that focus on cognitive function, each additional 15 min

## 2022-05-24 ENCOUNTER — HOSPITAL ENCOUNTER (OUTPATIENT)
Dept: SPEECH THERAPY | Age: 42
Setting detail: THERAPIES SERIES
Discharge: HOME | End: 2022-05-24
Payer: COMMERCIAL

## 2022-05-24 PROCEDURE — 97129 THER IVNTJ 1ST 15 MIN: CPT

## 2022-05-24 PROCEDURE — 97130 THER IVNTJ EA ADDL 15 MIN: CPT

## 2022-05-24 NOTE — PROGRESS NOTES
Lay Mcadams evaluated through a synchronous (real-time) audio-video encounter. The patient (or guardian if applicable) is aware that this is a billable service, which includes applicable co-pays. This virtual visit was conducted with patient's (and/or legal guardian's) consent. The visit was conducted pursuant to the emergency declaration under the [address] [place] [address] and the [place] [employer] and [employer] General Act. Patient identification was verified, and a caregiver was present when appropriate. The patient was located in a state where the provider was licensed to provide care. 30 minute individual virtual session through [employer]. Student SLP present. The pt was pleasant and attentive and engaged in conversational speech. The patient recalled details in simple paragraphs with 78% and follow-up questions after a short delay with 100% accuracy. She recalled details in intermediate level paragraphs read x 1 by the SLP with 75% accuracy and follow-up questions after a short delay with 78% accuracy. She recalled 4 words using grouping and rehearsing with >75% accuracy. She used rehearsing strategies with minimal verbal cues during this task. Ideas for carryover to home were provided. Continue POC.     Karen Soria M.A., [address] [facility]  Speech Pathologist  5/24/2022    90140  therapeutic interventions that focus on cognitive function , initial  15 min  87490  therapeutic interventions that focus on cognitive function, each additional 15 min

## 2022-05-31 PROCEDURE — 97130 THER IVNTJ EA ADDL 15 MIN: CPT

## 2022-05-31 PROCEDURE — 97129 THER IVNTJ 1ST 15 MIN: CPT

## 2022-05-31 NOTE — PROGRESS NOTES
This visit was completed virtually using My Chart. 30 minute individual virtual session through [employer]. The pt was pleasant and attentive and engaged in conversational speech. The patient recalled details in complex sentences read by the SLP with 86% accuracy using rehearsing. She recalled 4 words using grouping and rehearsing with fair+ accuracy. The pt answered temporal orientation questions with good ability. She recalled 3/3 details after a 15 minute delay. The pt provided solutions to various problem-solving scenarios with 5/5 correct. Ideas for carryover to home were provided. Continue POC.     Adrian Fisher M.A.  Speech Pathologist  6/01/2022    84052  therapeutic interventions that focus on cognitive function , initial  15 min  39649  therapeutic interventions that focus on cognitive function, each additional 15 min

## 2022-06-01 ENCOUNTER — HOSPITAL ENCOUNTER (OUTPATIENT)
Dept: SPEECH THERAPY | Age: 42
Setting detail: THERAPIES SERIES
Discharge: HOME | End: 2022-05-31
Payer: COMMERCIAL

## 2022-06-07 NOTE — PROGRESS NOTES
This visit was completed virtually using doxy as we could not connect to WrnchElizabeth Mcadams evaluated through a synchronous (real-time) audio-video encounter. The patient (or guardian if applicable) is aware that this is a billable service, which includes applicable co-pays. This virtual visit was conducted with patient's (and/or legal guardian's) consent. The visit was conducted pursuant to the emergency declaration under the [address] [place] [address] authority and the [employer] and [employer] General Act. Patient identification was verified, and a caregiver was present when appropriate. The patient was located in a state where the provider was licensed to provide care. 30 minute individual virtual session. The pt was pleasant and attentive and engaged in conversational speech. The patient recalled details in simple paragraphs read by the SLP with 77% accuracy using rehearsing. The pt answered auditory processing questions containing temporal and sequential concepts with 60% accuracy. Strategies to organize the information before responding seemed to be helpful. The pt provided solutions to various problem-solving scenarios with 5/5 correct. Ideas for carryover to home were provided. Continue POC.     Hawk Cody M.A., [address] [facility]  Speech Pathologist  6/8/2022    22335  therapeutic interventions that focus on cognitive function , initial  15 min  25260  therapeutic interventions that focus on cognitive function, each additional 15 min

## 2022-06-08 ENCOUNTER — HOSPITAL ENCOUNTER (OUTPATIENT)
Dept: SPEECH THERAPY | Age: 42
Setting detail: THERAPIES SERIES
Discharge: HOME | End: 2022-06-08
Payer: COMMERCIAL

## 2022-06-08 PROCEDURE — 97130 THER IVNTJ EA ADDL 15 MIN: CPT

## 2022-06-08 PROCEDURE — 97129 THER IVNTJ 1ST 15 MIN: CPT

## 2022-06-14 NOTE — PROGRESS NOTES
This visit was completed virtually using doxy as we could not connect to [employer]. 30 minute individual session conducted by  clinician. The pt appeared attentive and engaged in conversational speech. Pt demonstrated accurate use of memory pegs when presented with a group of three words that had a common feature. Pt was able to provide a specific category for each group of words and was later able to recall these words with minimal support with 90% accuracy. Pt was presented with a new task of unscrambling groups of four words to create a coherent phrase with moderate support and 50% accuracy. Pt was encouraged to repeat the groups of words in addition to writing them down in order to have a visual aid for this task. Continue POC.     Radha Luciano, SLP  Clinician    Dat Juan M.A.  Speech Pathologist  6/15/2022    61351  therapeutic interventions that focus on cognitive function , initial  15 min  67253  therapeutic interventions that focus on cognitive function, each additional 15 min

## 2022-06-15 ENCOUNTER — HOSPITAL ENCOUNTER (OUTPATIENT)
Dept: SPEECH THERAPY | Age: 42
Setting detail: THERAPIES SERIES
Discharge: HOME | End: 2022-06-15
Payer: COMMERCIAL

## 2022-06-15 PROCEDURE — 97130 THER IVNTJ EA ADDL 15 MIN: CPT

## 2022-06-15 PROCEDURE — 97129 THER IVNTJ 1ST 15 MIN: CPT

## 2022-06-21 ENCOUNTER — HOSPITAL ENCOUNTER (OUTPATIENT)
Dept: SPEECH THERAPY | Age: 42
Setting detail: THERAPIES SERIES
Discharge: HOME | End: 2022-06-21
Payer: COMMERCIAL

## 2022-06-21 PROCEDURE — 97129 THER IVNTJ 1ST 15 MIN: CPT

## 2022-06-21 NOTE — PROGRESS NOTES
Chhaya Khan was treated through a synchronous (real-time) audio-video encounter. The patient (or guardian if applicable) is aware that this is a billable service, which includes applicable co-pays. This virtual visit was conducted with patient's (and/or legal guardian's) consent. The visit was conducted pursuant to the emergency declaration under the [facility], [address] [place] [address] authority and the [employer] and [employer] General Act. Patient identification was verified, and a caregiver was present when appropriate. The patient was located in a state where the provider was licensed to provide care. This visit was completed virtually using doxy as we could not connect to [employer]. 15 minute individual session as the pt was a few minutes late. The pt was attentive and engaged easily in conversational speech. She named 3 additional items in categories given 3 items with >75% accuracy. The SLP read 5 words and the pt recalled words that did not fit into a category with >85% accuracy. Pt continues to make progress in therapy. Continue POC.     Bony Iverson M.A., [address] [facility]  Speech Pathologist  6/21/2022    [address]3886  therapeutic interventions that focus on cognitive function , initial  15 min

## 2022-06-28 ENCOUNTER — APPOINTMENT (OUTPATIENT)
Dept: SPEECH THERAPY | Age: 42
End: 2022-06-28
Payer: COMMERCIAL

## 2022-06-28 ENCOUNTER — HOSPITAL ENCOUNTER (OUTPATIENT)
Dept: SPEECH THERAPY | Age: 42
Setting detail: THERAPIES SERIES
Discharge: HOME | End: 2022-06-28
Payer: COMMERCIAL

## 2022-06-28 PROCEDURE — 97130 THER IVNTJ EA ADDL 15 MIN: CPT

## 2022-06-28 PROCEDURE — 97129 THER IVNTJ 1ST 15 MIN: CPT

## 2022-06-28 NOTE — PROGRESS NOTES
Alyssa Kinsey was treated through a synchronous (real-time) audio-video encounter. The patient (or guardian if applicable) is aware that this is a billable service, which includes applicable co-pays. This virtual visit was conducted with patient's (and/or legal guardian's) consent. The visit was conducted pursuant to the emergency declaration under the [facility], [id number] waiver authority and the [facility] Act. Patient identification was verified, and a caregiver was present when appropriate. The patient was located in a state where the provider was licensed to provide care. This visit was completed virtually using doxy as we could not connect to Q-Bot. 23 minute individual session as the pt was a few minutes late. The pt was attentive and engaged easily in conversational speech. The SLP read 5 words and the pt recalled words using rehearsing and associations with 83% accuracy. She answered auditory processing questions containing simple math with 62% accuracy. Pt continues to make progress in therapy. Continue POC.     Indy Garcia M.A., [address] [facility]  Speech Pathologist  6/28/2022    16435  therapeutic interventions that focus on cognitive function , initial  15 min  [id number]  therapeutic interventions that focus on cognitive function, each additional 15 min

## 2022-06-29 ENCOUNTER — SCHEDULED TELEPHONE ENCOUNTER (OUTPATIENT)
Dept: NEUROLOGY | Age: 42
End: 2022-06-29
Payer: COMMERCIAL

## 2022-06-29 DIAGNOSIS — G35 MULTIPLE SCLEROSIS (HCC): Primary | ICD-10-CM

## 2022-06-29 PROCEDURE — 99448 NTRPROF PH1/NTRNET/EHR 21-30: CPT | Performed by: PSYCHIATRY & NEUROLOGY

## 2022-06-29 RX ORDER — DIVALPROEX SODIUM 250 MG/1
250 TABLET, DELAYED RELEASE ORAL 2 TIMES DAILY
Qty: 60 TABLET | Refills: 5 | Status: SHIPPED
Start: 2022-06-29 | End: 2022-07-22

## 2022-06-29 NOTE — PROGRESS NOTES
Danny Plascencia was read the following message We want to confirm that, for purposes of billing, this is a virtual visit with your provider for which we will submit a claim for reimbursement with your insurance company. You will be responsible for any copays, coinsurance amounts or other amounts not covered by your insurance company. If you do not accept this, unfortunately we will not be able to schedule or proceed with a virtual visit with the provider. Do you accept? Erick Luis responded Yes .

## 2022-06-29 NOTE — PROGRESS NOTES
[address] [facility] LETICIA Marquez, ISAIAH [place], [id number] Kun Kinsey was a 20-year-old right handed woman who was referred for evaluation and management of longstanding multiple sclerosis and congenital spastic paraparesis. The patient remained a poor historian. Her mother provided most of the history. This visit was per telephone. Her medications were now fluticasone, multivitamins, aspirin and dimethyl fumarate. She was now off sertraline. She was referred after diagnosed with multiple sclerosis. She always walked slowly. There were more difficulties with her gait the last 6 years. Her legs felt numb and were clumsy. She experienced sensory loss around her buttocks and perineum. There was never arm involvement. However, there remained longstanding cognitive issues. Her mother denied additional speech difficulties, headaches, visual problems, spinning sensations, swallowing or chewing abnormalities or loss of consciousness. She tolerated dimethyl fumarate well, without stomach upset. The patient denied progression of her disease. There were no additional rashes. Her mother noted intermittent tremors of her outstretched hands; she and the patient's grandmother were afflicted with similar shakings. Her mother felt she did not require treatment for these tremors. She was the product of a normal pregnancy and delivery. She reached all her milestones late. She did not walk until 4years of age. She spoke afterwards. She was always slow and unable to play with her peers. There were never seizures. There were moderate cognitive difficulties, without changes    Her mother reported no other neurological issues. Her mother now question using ocrelizumab instead of dimethyl fumarate. She was eating better and sleeping well. She had obtained bilateral AFO braces, which improved her walking.   She received her COVID-19 vaccinations and boosters. Review of systems was otherwise unremarkable. Objective:     She was afebrile in no acute distress. She was breathing comfortably, without chest pain or shortness of breath. There were no palpitations. Her skin revealed the above lesions. These were not tender to her touch. Her limbs displayed no other abnormalities. Neurological examination produced an intact mental status except for limited insight into her disorder. I again heard a slightly spastic, ataxic dysarthria. All responses were slow and inaccurate at times. Her mother denied any cranial nerve abnormalities. Her strength was intact, with persistent spastic legs. She appreciated light touch in all limbs. Finger-to-nose and heel shin testing were performed slowly, but accurately. She again displayed a spastic paraparetic gait. Her neurological examination was reportedly unchanged. Laboratory/Radiology:  ry/Radiology:     Recent CBC with differential was unremarkable. CMP was unremarkable except for a absolute lymphocyte count of 1000. The past HUGO virus assay was negative. MRI brain with/without contrast 3 years ago revealed moderate white matter disease in the periventricular regions and gray-white matter junctions, with no active lesions. MRI of her cervical spine displayed abnormal signal in the upper cord without active disease. MRIs of her lumbosacral spine and thoracic spine displayed demyelinating lesions as well. 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 There were never seizures. Her gait worsened from multiple sclerosis. She remains on dimethyl fumarate, without reported additional deficits, and improvement with AFO bracing. I agree with using ocrelizumab instead of dimethyl fumarate. We will proceed with additional testing and approval to use this medication.     Her mother noted increasing irritability. I will try low-dose valproate, since she reportedly did not tolerate low-dose sertraline. 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 She remains stable medically. Plan:     She will continue with dimethyl fumarate for now; ocrelizumab will be pursued. Appropriate blood work was ordered to use that drug. Valproate was added 250 mg twice daily, to, hopefully, improve her behavior. She will return in 4 months to the office; her mother will report back in 1 month. She will call anytime if problems arise. I spent 30 minutes with the patient with over 50 % spent in counseling and disease management. All patient issues were addressed and all questions were answered. Tyler Flores was a 44year old individual who was evaluated via telephone on 6/29/2022. Consent:  She and/or health care decision maker is aware that that she may receive a bill for this telephone service, depending on her insurance coverage, and has provided verbal consent to proceed--- yes. Documentation:  I communicated with the patient and/or health care decision maker about her multiple sclerosis. Details of this discussion including any medical advice provided per telephone. I affirmed this is a Patient Initiated Episode with an Established Patient who has not had a related appointment within my department in the past 7 days or scheduled within the next 24 hours. Again I spent 30 minutes with the patient.       Carlos Eduardo Brown MD

## 2022-06-30 ENCOUNTER — TELEPHONE (OUTPATIENT)
Dept: NEUROLOGY | Age: 42
End: 2022-06-30

## 2022-06-30 NOTE — TELEPHONE ENCOUNTER
Spoke with patient and Mother, both are unsure if they want to proceed with [address] [place] [address]. Information along with financial forms, labs mailed to patient.  Pt instructed to update office with her decision

## 2022-07-05 ENCOUNTER — HOSPITAL ENCOUNTER (OUTPATIENT)
Dept: SPEECH THERAPY | Age: 42
Setting detail: THERAPIES SERIES
Discharge: HOME | End: 2022-07-05
Payer: COMMERCIAL

## 2022-07-05 PROCEDURE — 97129 THER IVNTJ 1ST 15 MIN: CPT

## 2022-07-05 PROCEDURE — 97130 THER IVNTJ EA ADDL 15 MIN: CPT

## 2022-07-06 NOTE — PROGRESS NOTES
Rambo Dempsey was treated through a synchronous (real-time) audio-video encounter. The patient (or guardian if applicable) is aware that this is a billable service, which includes applicable co-pays. This virtual visit was conducted with patient's (and/or legal guardian's) consent. The visit was conducted pursuant to the emergency declaration under the [facility], [address] [place] [address] authority and the [employer] General Act. Patient identification was verified, and a caregiver was present when appropriate. The patient was located in a state where the provider was licensed to provide care. This visit was completed virtually using doxy as we could not connect to FTRANS. 30 minute individual session conducted by student SLP and supervised by managing SLP. The pt was attentive and engaged easily in conversational speech. The following was targeted in therapy:    1. Pt completed receptive language tasks. Pt listened to moderately detailed paragraphs and answered questions about each paragraph. Pt achieved 67% accuracy with minimal support. - Student SLP and pt noted that paragraphs involving numbers (I.e., dates, interest rates, money amount, etc.) are more difficult to follow). Continue POC.       Ramsey Liu, SLP  Clinician    Shelley Hutchison M.A., [address] [facility]  Speech Pathologist  7/6/2022    [address]  therapeutic interventions that focus on cognitive function , initial  15 min  [address]40  therapeutic interventions that focus on cognitive function, each additional 15 min

## 2022-07-19 ENCOUNTER — HOSPITAL ENCOUNTER (OUTPATIENT)
Dept: SPEECH THERAPY | Age: 42
Setting detail: THERAPIES SERIES
Discharge: HOME | End: 2022-07-19
Payer: COMMERCIAL

## 2022-07-19 PROCEDURE — 97130 THER IVNTJ EA ADDL 15 MIN: CPT

## 2022-07-19 PROCEDURE — 97129 THER IVNTJ 1ST 15 MIN: CPT

## 2022-07-22 RX ORDER — DIVALPROEX SODIUM 250 MG/1
TABLET, DELAYED RELEASE ORAL
Qty: 180 TABLET | OUTPATIENT
Start: 2022-07-22

## 2022-07-22 RX ORDER — DIVALPROEX SODIUM 250 MG/1
TABLET, DELAYED RELEASE ORAL
Qty: 60 TABLET | Refills: 0 | Status: ON HOLD
Start: 2022-07-22 | End: 2022-08-01 | Stop reason: SDUPTHER

## 2022-07-22 NOTE — TELEPHONE ENCOUNTER
Pt needs medication sent to [facility]. Mother very upset that it was sent to the wrong pharmacy.  Requesting 90 day script

## 2022-07-25 ENCOUNTER — HOSPITAL ENCOUNTER (INPATIENT)
Age: 42
LOS: 6 days | Discharge: HOME | DRG: 885 | End: 2022-08-01
Attending: EMERGENCY MEDICINE | Admitting: PSYCHIATRY & NEUROLOGY
Payer: COMMERCIAL

## 2022-07-25 DIAGNOSIS — T14.91XA SUICIDE ATTEMPT (HCC): Primary | ICD-10-CM

## 2022-07-25 DIAGNOSIS — R45.851 DEPRESSION WITH SUICIDAL IDEATION: ICD-10-CM

## 2022-07-25 DIAGNOSIS — F32.A DEPRESSION WITH SUICIDAL IDEATION: ICD-10-CM

## 2022-07-25 PROCEDURE — 93005 ELECTROCARDIOGRAM TRACING: CPT | Performed by: STUDENT IN AN ORGANIZED HEALTH CARE EDUCATION/TRAINING PROGRAM

## 2022-07-25 PROCEDURE — 99285 EMERGENCY DEPT VISIT HI MDM: CPT

## 2022-07-25 ASSESSMENT — PAIN - FUNCTIONAL ASSESSMENT: PAIN_FUNCTIONAL_ASSESSMENT: NONE - DENIES PAIN

## 2022-07-26 ENCOUNTER — HOSPITAL ENCOUNTER (OUTPATIENT)
Dept: SPEECH THERAPY | Age: 42
Setting detail: THERAPIES SERIES
Discharge: HOME | End: 2022-07-26
Payer: COMMERCIAL

## 2022-07-26 PROBLEM — F33.9 MDD (MAJOR DEPRESSIVE DISORDER), RECURRENT EPISODE (HCC): Status: ACTIVE | Noted: 2022-07-26

## 2022-07-26 LAB
ACETAMINOPHEN LEVEL: <5 MCG/ML (ref 10–30)
ALBUMIN SERPL-MCNC: 3.8 G/DL (ref 3.5–5.2)
ALP BLD-CCNC: 45 U/L (ref 35–104)
ALT SERPL-CCNC: 10 U/L (ref 0–32)
AMMONIA: 69 UMOL/L (ref 11–51)
AMMONIA: <10 UMOL/L (ref 11–51)
AMPHETAMINE SCREEN, URINE: NOT DETECTED
ANION GAP SERPL CALCULATED.3IONS-SCNC: 10 MMOL/L (ref 7–16)
AST SERPL-CCNC: 13 U/L (ref 0–31)
BACTERIA: NORMAL /HPF
BARBITURATE SCREEN URINE: NOT DETECTED
BASOPHILS ABSOLUTE: 0.07 E9/L (ref 0–0.2)
BASOPHILS RELATIVE PERCENT: 0.9 % (ref 0–2)
BENZODIAZEPINE SCREEN, URINE: NOT DETECTED
BILIRUB SERPL-MCNC: <0.2 MG/DL (ref 0–1.2)
BILIRUBIN URINE: NEGATIVE
BLOOD, URINE: NEGATIVE
BUN BLDV-MCNC: 9 MG/DL (ref 6–20)
CALCIUM SERPL-MCNC: 8.6 MG/DL (ref 8.6–10.2)
CANNABINOID SCREEN URINE: NOT DETECTED
CHLORIDE BLD-SCNC: 105 MMOL/L (ref 98–107)
CHP ED QC CHECK: YES
CLARITY: CLEAR
CO2: 23 MMOL/L (ref 22–29)
COCAINE METABOLITE SCREEN URINE: NOT DETECTED
COLOR: YELLOW
CREAT SERPL-MCNC: 0.7 MG/DL (ref 0.5–1)
EKG ATRIAL RATE: 67 BPM
EKG P AXIS: 50 DEGREES
EKG P-R INTERVAL: 156 MS
EKG Q-T INTERVAL: 400 MS
EKG QRS DURATION: 88 MS
EKG QTC CALCULATION (BAZETT): 422 MS
EKG R AXIS: 69 DEGREES
EKG T AXIS: 51 DEGREES
EKG VENTRICULAR RATE: 67 BPM
EOSINOPHILS ABSOLUTE: 0.09 E9/L (ref 0.05–0.5)
EOSINOPHILS RELATIVE PERCENT: 1.2 % (ref 0–6)
ETHANOL: <10 MG/DL (ref 0–0.08)
FENTANYL SCREEN, URINE: NOT DETECTED
GFR AFRICAN AMERICAN: >60
GFR NON-AFRICAN AMERICAN: >60 ML/MIN/1.73
GLUCOSE BLD-MCNC: 92 MG/DL (ref 74–99)
GLUCOSE URINE: NEGATIVE MG/DL
HCG(URINE) PREGNANCY TEST: NEGATIVE
HCT VFR BLD CALC: 37.7 % (ref 34–48)
HEMOGLOBIN: 12.5 G/DL (ref 11.5–15.5)
IMMATURE GRANULOCYTES #: 0.03 E9/L
IMMATURE GRANULOCYTES %: 0.4 % (ref 0–5)
INFLUENZA A: NOT DETECTED
INFLUENZA B: NOT DETECTED
KETONES, URINE: NEGATIVE MG/DL
LEUKOCYTE ESTERASE, URINE: NEGATIVE
LYMPHOCYTES ABSOLUTE: 1.62 E9/L (ref 1.5–4)
LYMPHOCYTES RELATIVE PERCENT: 21.7 % (ref 20–42)
Lab: NORMAL
MCH RBC QN AUTO: 32.1 PG (ref 26–35)
MCHC RBC AUTO-ENTMCNC: 33.2 % (ref 32–34.5)
MCV RBC AUTO: 96.7 FL (ref 80–99.9)
METER GLUCOSE: 89 MG/DL (ref 74–99)
METHADONE SCREEN, URINE: NOT DETECTED
MONOCYTES ABSOLUTE: 0.72 E9/L (ref 0.1–0.95)
MONOCYTES RELATIVE PERCENT: 9.6 % (ref 2–12)
NEUTROPHILS ABSOLUTE: 4.94 E9/L (ref 1.8–7.3)
NEUTROPHILS RELATIVE PERCENT: 66.2 % (ref 43–80)
NITRITE, URINE: NEGATIVE
OPIATE SCREEN URINE: NOT DETECTED
OXYCODONE URINE: NOT DETECTED
PDW BLD-RTO: 12.6 FL (ref 11.5–15)
PH UA: 6.5 (ref 5–9)
PHENCYCLIDINE SCREEN URINE: NOT DETECTED
PLATELET # BLD: 357 E9/L (ref 130–450)
PMV BLD AUTO: 8.7 FL (ref 7–12)
POTASSIUM REFLEX MAGNESIUM: 4.1 MMOL/L (ref 3.5–5)
PROTEIN UA: NEGATIVE MG/DL
RBC # BLD: 3.9 E12/L (ref 3.5–5.5)
RBC UA: NORMAL /HPF (ref 0–2)
SALICYLATE, SERUM: <0.3 MG/DL (ref 0–30)
SARS-COV-2 RNA, RT PCR: NOT DETECTED
SODIUM BLD-SCNC: 138 MMOL/L (ref 132–146)
SPECIFIC GRAVITY UA: 1.01 (ref 1–1.03)
TOTAL PROTEIN: 6.4 G/DL (ref 6.4–8.3)
TRICYCLIC ANTIDEPRESSANTS SCREEN SERUM: NEGATIVE NG/ML
UROBILINOGEN, URINE: 0.2 E.U./DL
VALPROIC ACID LEVEL: 129 MCG/ML (ref 50–100)
VALPROIC ACID LEVEL: 185 MCG/ML (ref 50–100)
VALPROIC ACID LEVEL: 53 MCG/ML (ref 50–100)
WBC # BLD: 7.5 E9/L (ref 4.5–11.5)
WBC UA: NORMAL /HPF (ref 0–5)

## 2022-07-26 PROCEDURE — 80143 DRUG ASSAY ACETAMINOPHEN: CPT

## 2022-07-26 PROCEDURE — 90792 PSYCH DIAG EVAL W/MED SRVCS: CPT

## 2022-07-26 PROCEDURE — 2580000003 HC RX 258: Performed by: STUDENT IN AN ORGANIZED HEALTH CARE EDUCATION/TRAINING PROGRAM

## 2022-07-26 PROCEDURE — 81001 URINALYSIS AUTO W/SCOPE: CPT

## 2022-07-26 PROCEDURE — 1240000000 HC EMOTIONAL WELLNESS R&B

## 2022-07-26 PROCEDURE — 82140 ASSAY OF AMMONIA: CPT

## 2022-07-26 PROCEDURE — 85025 COMPLETE CBC W/AUTO DIFF WBC: CPT

## 2022-07-26 PROCEDURE — 80164 ASSAY DIPROPYLACETIC ACD TOT: CPT

## 2022-07-26 PROCEDURE — 87636 SARSCOV2 & INF A&B AMP PRB: CPT

## 2022-07-26 PROCEDURE — 80053 COMPREHEN METABOLIC PANEL: CPT

## 2022-07-26 PROCEDURE — 82077 ASSAY SPEC XCP UR&BREATH IA: CPT

## 2022-07-26 PROCEDURE — 36415 COLL VENOUS BLD VENIPUNCTURE: CPT

## 2022-07-26 PROCEDURE — 80307 DRUG TEST PRSMV CHEM ANLYZR: CPT

## 2022-07-26 PROCEDURE — 82962 GLUCOSE BLOOD TEST: CPT

## 2022-07-26 PROCEDURE — 80179 DRUG ASSAY SALICYLATE: CPT

## 2022-07-26 PROCEDURE — 81025 URINE PREGNANCY TEST: CPT

## 2022-07-26 RX ORDER — 0.9 % SODIUM CHLORIDE 0.9 %
1000 INTRAVENOUS SOLUTION INTRAVENOUS ONCE
Status: COMPLETED | OUTPATIENT
Start: 2022-07-26 | End: 2022-07-26

## 2022-07-26 RX ORDER — MAGNESIUM HYDROXIDE/ALUMINUM HYDROXICE/SIMETHICONE 120; 1200; 1200 MG/30ML; MG/30ML; MG/30ML
30 SUSPENSION ORAL PRN
Status: DISCONTINUED | OUTPATIENT
Start: 2022-07-26 | End: 2022-08-01 | Stop reason: HOSPADM

## 2022-07-26 RX ORDER — HALOPERIDOL 5 MG/ML
5 INJECTION INTRAMUSCULAR EVERY 6 HOURS PRN
Status: DISCONTINUED | OUTPATIENT
Start: 2022-07-26 | End: 2022-08-01 | Stop reason: HOSPADM

## 2022-07-26 RX ORDER — SERTRALINE HYDROCHLORIDE 25 MG/1
25 TABLET, FILM COATED ORAL DAILY
Status: DISCONTINUED | OUTPATIENT
Start: 2022-07-26 | End: 2022-07-28

## 2022-07-26 RX ORDER — ACETAMINOPHEN 325 MG/1
650 TABLET ORAL EVERY 6 HOURS PRN
Status: DISCONTINUED | OUTPATIENT
Start: 2022-07-26 | End: 2022-08-01 | Stop reason: HOSPADM

## 2022-07-26 RX ORDER — HALOPERIDOL 5 MG
5 TABLET ORAL EVERY 6 HOURS PRN
Status: DISCONTINUED | OUTPATIENT
Start: 2022-07-26 | End: 2022-08-01 | Stop reason: HOSPADM

## 2022-07-26 RX ORDER — HYDROXYZINE PAMOATE 50 MG/1
50 CAPSULE ORAL 3 TIMES DAILY PRN
Status: DISCONTINUED | OUTPATIENT
Start: 2022-07-26 | End: 2022-08-01 | Stop reason: HOSPADM

## 2022-07-26 RX ORDER — LANOLIN ALCOHOL/MO/W.PET/CERES
3 CREAM (GRAM) TOPICAL NIGHTLY
Status: DISCONTINUED | OUTPATIENT
Start: 2022-07-26 | End: 2022-08-01 | Stop reason: HOSPADM

## 2022-07-26 RX ORDER — NICOTINE 21 MG/24HR
1 PATCH, TRANSDERMAL 24 HOURS TRANSDERMAL DAILY
Status: DISCONTINUED | OUTPATIENT
Start: 2022-07-27 | End: 2022-08-01 | Stop reason: HOSPADM

## 2022-07-26 RX ADMIN — SODIUM CHLORIDE 1000 ML: 9 INJECTION, SOLUTION INTRAVENOUS at 06:08

## 2022-07-26 ASSESSMENT — LIFESTYLE VARIABLES
HOW OFTEN DO YOU HAVE A DRINK CONTAINING ALCOHOL: NEVER
HOW MANY STANDARD DRINKS CONTAINING ALCOHOL DO YOU HAVE ON A TYPICAL DAY: PATIENT DOES NOT DRINK

## 2022-07-26 ASSESSMENT — SLEEP AND FATIGUE QUESTIONNAIRES
DO YOU USE A SLEEP AID: NO
DO YOU HAVE DIFFICULTY SLEEPING: NO
AVERAGE NUMBER OF SLEEP HOURS: 7.5

## 2022-07-26 NOTE — ED NOTES
Kennedi Kim called and I spoke to her in regards to discharging this pt.     Pt will in fact need admission    Pt is pink slipped    JOSE will proceed with admission when a bed is available     AMANDA Rodriguez  07/26/22 6925

## 2022-07-26 NOTE — DISCHARGE INSTRUCTIONS
Follow up with on demand as scheduled    Help Hotline 76-15153159 or [phone] or 211   24 hour crisis line for the following [address] WARM LINE: [phone]  Monday through Friday 4pm to 8pm and Saturday 1pm-3pm   You can call during these times to talk with a peer support person as needed  Please reach out to one of the following community providers for treatment and support. Help Hotline: [phone] or 1818 [address] and Recovery Board [phone]  [place] [address] and [facility] Board [phone]  If you are in need of help and experiencing any barriers to care please contact help hospitals 24 hours a day and/or your local board of mental health and recovery during normal business hours. Detox program inpatient:   [place] [phone]  [facility] [phone]  [facility] [phone]  [facility] [phone]  Dori Jones [phone]  [place] services hotline 128-106-6781    Outpatient programs  [address] outpatient treatment [phone] ext. [facility] [phone]  [facility] [phone]  [facility] services:   [facility]ernanda Aschoff [place] [phone]  [address] [phone]  [facility]  [phone]  [facility] [facility] [phone]   [facility] services [phone]  [facility] [place] [phone]    For additional resource support please feel free to contact the behavioral access line at [phone] or the Intensive outpatient department at [phone].

## 2022-07-26 NOTE — ED PROVIDER NOTES
Department of Emergency Medicine   ED  Provider Note  Admit Date/RoomTime: 7/25/2022 10:32 PM  ED Room: 7519/7519-B              7/25/22  10:33 PM EDT    HPI: Barbra Daugherty 40 y.o. female presents with a complaint of suicidal beginning 1-2 hrs ago. Complaint has been constant and became more severe today which is what prompted the visit. endorses Suicidal ideation. Denies Homicidal ideation. does specific plan. 50-year-old female presents emergency room complaint of suicidal ideations. She attempted to take her Depakote took approximately 30 pills she states then threw up. She is noted to have 33 pills in her container however the containers for a quantity of 60 she is missing 27 appears to be on the container was filled 3 days ago. She takes 1 twice a day. Otherwise denies any other acute complaints is never tried a suicidal attempt before. Does have a history of depression which is why she is on Depakote. Otherwise no other acute complaints at this time. Nothing makes her symptoms better nothing makes them worse. Moderate in severity no other acute complaints. Review of Systems:   Pertinent positives and negatives are stated within HPI, all other systems reviewed and are negative.        --------------------------------------------- PAST HISTORY ---------------------------------------------  Past Medical History:  has a past medical history of Chronic back pain, Delayed growth and development, Heel spur, and STD (female). Past Surgical History:  has a past surgical history that includes Foot surgery. Social History:  reports that she has never smoked. She has never used smokeless tobacco. She reports that she does not drink alcohol and does not use drugs.     Family History: family history includes Cancer in her maternal aunt and maternal grandfather; Diabetes in her maternal grandfather, maternal grandmother, and mother; High Cholesterol in her brother and mother; Hypertension in her brother and mother. The patients home medications have been reviewed. Allergies: Patient has no known allergies. -------------------------------------------------- RESULTS -------------------------------------------------  All laboratory and imaging studies were reviewed by myself.     LABS:  Results for orders placed or performed during the hospital encounter of 07/25/22   COVID-19 & Influenza Combo    Specimen: Nasopharyngeal Swab   Result Value Ref Range    SARS-CoV-2 RNA, RT PCR NOT DETECTED NOT DETECTED    INFLUENZA A NOT DETECTED NOT DETECTED    INFLUENZA B NOT DETECTED NOT DETECTED   CBC with Auto Differential   Result Value Ref Range    WBC 7.5 4.5 - 11.5 E9/L    RBC 3.90 3.50 - 5.50 E12/L    Hemoglobin 12.5 11.5 - 15.5 g/dL    Hematocrit 37.7 34.0 - 48.0 %    MCV 96.7 80.0 - 99.9 fL    MCH 32.1 26.0 - 35.0 pg    MCHC 33.2 32.0 - 34.5 %    RDW 12.6 11.5 - 15.0 fL    Platelets 004 726 - 610 E9/L    MPV 8.7 7.0 - 12.0 fL    Neutrophils % 66.2 43.0 - 80.0 %    Immature Granulocytes % 0.4 0.0 - 5.0 %    Lymphocytes % 21.7 20.0 - 42.0 %    Monocytes % 9.6 2.0 - 12.0 %    Eosinophils % 1.2 0.0 - 6.0 %    Basophils % 0.9 0.0 - 2.0 %    Neutrophils Absolute 4.94 1.80 - 7.30 E9/L    Immature Granulocytes # 0.03 E9/L    Lymphocytes Absolute 1.62 1.50 - 4.00 E9/L    Monocytes Absolute 0.72 0.10 - 0.95 E9/L    Eosinophils Absolute 0.09 0.05 - 0.50 E9/L    Basophils Absolute 0.07 0.00 - 0.20 E9/L   Comprehensive Metabolic Panel w/ Reflex to MG   Result Value Ref Range    Sodium 138 132 - 146 mmol/L    Potassium reflex Magnesium 4.1 3.5 - 5.0 mmol/L    Chloride 105 98 - 107 mmol/L    CO2 23 22 - 29 mmol/L    Anion Gap 10 7 - 16 mmol/L    Glucose 92 74 - 99 mg/dL    BUN 9 6 - 20 mg/dL    Creatinine 0.7 0.5 - 1.0 mg/dL    GFR Non-African American >60 >=60 mL/min/1.73    GFR African American >60     Calcium 8.6 8.6 - 10.2 mg/dL    Total Protein 6.4 6.4 - 8.3 g/dL    Albumin 3.8 3.5 - 5.2 g/dL    Total Bilirubin <0.2 0.0 - 1.2 mg/dL    Alkaline Phosphatase 45 35 - 104 U/L    ALT 10 0 - 32 U/L    AST 13 0 - 31 U/L   Urinalysis with Microscopic   Result Value Ref Range    Color, UA Yellow Straw/Yellow    Clarity, UA Clear Clear    Glucose, Ur Negative Negative mg/dL    Bilirubin Urine Negative Negative    Ketones, Urine Negative Negative mg/dL    Specific Gravity, UA 1.015 1.005 - 1.030    Blood, Urine Negative Negative    pH, UA 6.5 5.0 - 9.0    Protein, UA Negative Negative mg/dL    Urobilinogen, Urine 0.2 <2.0 E.U./dL    Nitrite, Urine Negative Negative    Leukocyte Esterase, Urine Negative Negative    WBC, UA NONE 0 - 5 /HPF    RBC, UA 0-1 0 - 2 /HPF    Bacteria, UA NONE SEEN None Seen /HPF   URINE DRUG SCREEN   Result Value Ref Range    Amphetamine Screen, Urine NOT DETECTED Negative <1000 ng/mL    Barbiturate Screen, Ur NOT DETECTED Negative < 200 ng/mL    Benzodiazepine Screen, Urine NOT DETECTED Negative < 200 ng/mL    Cannabinoid Scrn, Ur NOT DETECTED Negative < 50ng/mL    Cocaine Metabolite Screen, Urine NOT DETECTED Negative < 300 ng/mL    Opiate Scrn, Ur NOT DETECTED Negative < 300ng/mL    PCP Screen, Urine NOT DETECTED Negative < 25 ng/mL    Methadone Screen, Urine NOT DETECTED Negative <300 ng/mL    Oxycodone Urine NOT DETECTED Negative <100 ng/mL    FENTANYL SCREEN, URINE NOT DETECTED Negative <1 ng/mL    Drug Screen Comment: see below    Serum Drug Screen   Result Value Ref Range    Ethanol Lvl <10 mg/dL    Acetaminophen Level <5.0 (L) 10.0 - 38.3 mcg/mL    Salicylate, Serum <6.2 0.0 - 30.0 mg/dL    TCA Scrn NEGATIVE Cutoff:300 ng/mL   Ammonia   Result Value Ref Range    Ammonia 69.0 (H) 11.0 - 51.0 umol/L   Valproic Acid Level, Total   Result Value Ref Range    Valproic Acid Lvl 129 (H) 50 - 100 mcg/mL   Valproic Acid Level, Total   Result Value Ref Range    Valproic Acid Lvl 53 50 - 100 mcg/mL   Valproic Acid Level, Total   Result Value Ref Range    Valproic Acid Lvl 185 (H) 50 - 100 mcg/mL   Pregnancy, urine   Result Value Ref Range    HCG(Urine) Pregnancy Test NEGATIVE NEGATIVE   Ammonia   Result Value Ref Range    Ammonia <10.0 (L) 11.0 - 51.0 umol/L   POCT Glucose   Result Value Ref Range    QC OK? yes    POCT Glucose   Result Value Ref Range    Meter Glucose 89 74 - 99 mg/dL   EKG 12 Lead   Result Value Ref Range    Ventricular Rate 67 BPM    Atrial Rate 67 BPM    P-R Interval 156 ms    QRS Duration 88 ms    Q-T Interval 400 ms    QTc Calculation (Bazett) 422 ms    P Axis 50 degrees    R Axis 69 degrees    T Axis 51 degrees       RADIOLOGY:  Interpreted by Radiologist.  No orders to display       EKG: This EKG is signed by emergency department physician. Rate: 67  Rhythm: Sinus  Interpretation: No acute ST elevation or depression sinus rhythm stable intervals  Comparison: stable as compared to patient's most recent EKG         ------------------------- NURSING NOTES AND VITALS REVIEWED ---------------------------   The nursing notes within the ED encounter and vital signs as below have been reviewed. /64   Pulse 83   Temp 98 °F (36.7 °C) (Oral)   Resp 19   SpO2 98%   Oxygen Saturation Interpretation: Normal            ---------------------------------------------------PHYSICAL EXAM--------------------------------------      Constitutional/General: Alert and oriented x3, well appearing, non toxic in NAD  Head: Normocephalic, atraumatic  Eyes: PERRL, EOMI  Mouth: Oropharynx clear, handling secretions, no trismus  Neck: Supple, full ROM, non tender to palpation in the midline, no stridor, no crepitus, no meningeal signs  Pulmonary: Lungs clear to auscultation bilaterally, no wheezes, rales, or rhonchi. Not in respiratory distress  Cardiovascular:  Regular rate and rhythm, no murmurs, gallops, or rubs. 2+ distal pulses  Abdomen: Soft, non tender, non distended, +BS, no rebound, guarding, or rigidity. No pulsatile masses appreciated  Extremities: Moves all extremities x 4.  Warm and well perfused, no clubbing, cyanosis, or edema. Capillary refill <3 seconds  Skin: warm and dry without rash  Neurologic: GCS 15, CN 2-12 grossly intact, no focal deficits, symmetric strength 5/5 in the upper and lower extremities bilaterally  Psych: sad Affect      ------------------------------------------ PROGRESS NOTES ------------------------------------------     Medical decision makin-year-old female presents emergency department complaint of suicidal attempt. She took her Depakote at home however she did vomit up 30 pills. However she is still missing 27 pills overall. Did speak with poison control who advised on Depakote and ammonia levels and possible need for carnitine. However patient on laboratory work-up showed her Depakote over several hours to be decreasing steadily so patient did not require Cardizem at this time. She was not having any signs and symptoms. Remaining laboratory work-up was reassuring she is medically cleared for psychiatric admission and evaluation at this time. Dispo pending  recommendations. Consultations:   Behavioral Health    Re-Evaluations:        Counseling: The emergency provider has spoken with thepatient and discussed todays results, in addition to providing specific details for the plan of care and counseling regarding the diagnosis and prognosis. Questions are answered at this time and they are agreeable with the plan.         --------------------------------- IMPRESSION AND DISPOSITION ---------------------------------    IMPRESSION  1. Suicide attempt ([facility] [place] [address]    2.  Depression with suicidal ideation        DISPOSITION  Disposition: as per consultant  Patient condition is stable           Kerrie Caceres MD  Resident  22

## 2022-07-26 NOTE — ED NOTES
Tried calling to give N2N, was left on hold for 5 minutes, will try again later.  ADAAR faxed     Mariusz Trent  07/26/22 Flory 37

## 2022-07-26 NOTE — PROGRESS NOTES
Pt no show for scheduled virtual speech session. The SLP attempted to call the pt x 3, however, the phone did not ring and a message said \"the pt's voice mail has not been set up\". Continue POC.     Rand Claire M.A., [address] [place] [address]  Speech Pathologist  7/26/2022

## 2022-07-26 NOTE — CONSULTS
PSYCHIATRY ATTENDING CONSULT    REASON FOR CONSULT:  Suicide attempt    REQUESTING PHYSICIAN:      CHIEF COMPLAINT: \"I will never do that again I learned my lesson. \"    HISTORY OF PRESENT ILLNESS:  Yaima Robles  is a 40 y.o. female who was admitted on 7/25/22 due to attempted suicide by taking half of her bottle of Depakote. She was unsure of how many pills (per notes about 30). Chart reviewed. Note home psychotropics of Depakote 250 mg twice daily and Zoloft 25 mg daily but patient not taking. Urine toxicology negative. . Patient has a history of multiple sclerosis. Psychiatry consulted for suicidal attempt by overdose on Depakote. On assessment patient resting in bed with sitter at bedside. Patient is alert and oriented x4, good eye contact. Patient pleasant and cooperative during assessment. Patient states that she lives with her mother and for the past couple weeks she has been feeling down with increased depression due to watching the news and states she was \"tired of everything. \"  She admits to oversleeping and isolating herself at times due to her depression. Currently patient denying active suicidal ideation. She denies anhedonia and states she has hope. She reports she lives home alone with her mother and does not have a lot of social interactions due to her multiple sclerosis. She denies any history of trauma or other triggers that is causing her depression. She explains that it was an impulsive act to take her Depakote pills and she is currently remorseful of this act. Patient denies any other suicide attempts in her past.  She denies history of psychiatric admissions. She has never seen a psychiatrist or counselor. Patient states she is sleeping well and her appetite is good. She is not suicidal manic or psychotic. No voiced delusions or hallucinations. PAST PSYCHIATRIC HISTORY: Denies any outpatient or inpatient psychiatric treatment.   Denies suicidal attempts before. Was prescribed Zoloft in her past by her PCP but is currently not taking them. PAST MEDICAL HISTORY:       Diagnosis Date    Chronic back pain     Delayed growth and development     Heel spur     Right    STD (female)     unknown as per pt; at Dr Radames Dela Cruz: General - negative, neurological - negative, ENT - negative, CV - negative, pulmonary - negative, GI - negative, dermatologic - negative, rheumatologic - negative, musculoskeletal - negative,  - negative, hematologic - negative    PAST SURGICAL HISTORY:       Procedure Laterality Date    FOOT SURGERY      bone spur, Dr Tiffani Hunt: No current facility-administered medications for this encounter. ALLERGIES:  Patient has no known allergies. FAMILY PSYCHIATRIC HISTORY: Denies any family psychiatric history. SOCIAL HISTORY: Patient born and raised in [facility]. She is single with no children and lives with her mother currently. Patient receives Social Security and is unemployed. SUBSTANCE ABUSE HISTORY:  reports that she has never smoked. She has never used smokeless tobacco. She reports that she does not drink alcohol and does not use drugs.     VITALS:   Vitals:    07/26/22 1015   BP:    Pulse:    Resp:    Temp: 97.7 °F (36.5 °C)   SpO2:        LABS:   Admission on 07/25/2022   Component Date Value Ref Range Status    Ventricular Rate 07/25/2022 67  BPM Incomplete    Atrial Rate 07/25/2022 67  BPM Incomplete    P-R Interval 07/25/2022 156  ms Incomplete    QRS Duration 07/25/2022 88  ms Incomplete    Q-T Interval 07/25/2022 400  ms Incomplete    QTc Calculation (Bazett) 07/25/2022 422  ms Incomplete    P Axis 07/25/2022 50  degrees Incomplete    R Axis 07/25/2022 69  degrees Incomplete    T Axis 07/25/2022 51  degrees Incomplete    WBC 07/26/2022 7.5  4.5 - 11.5 E9/L Final    RBC 07/26/2022 3.90  3.50 - 5.50 E12/L Final    Hemoglobin 07/26/2022 12.5  11.5 - 15.5 g/dL Final    Hematocrit 07/26/2022 37.7  34.0 - 48.0 % Final    MCV 07/26/2022 96.7  80.0 - 99.9 fL Final    MCH 07/26/2022 32.1  26.0 - 35.0 pg Final    MCHC 07/26/2022 33.2  32.0 - 34.5 % Final    RDW 07/26/2022 12.6  11.5 - 15.0 fL Final    Platelets 03/04/9849 357  130 - 450 E9/L Final    MPV 07/26/2022 8.7  7.0 - 12.0 fL Final    Neutrophils % 07/26/2022 66.2  43.0 - 80.0 % Final    Immature Granulocytes % 07/26/2022 0.4  0.0 - 5.0 % Final    Lymphocytes % 07/26/2022 21.7  20.0 - 42.0 % Final    Monocytes % 07/26/2022 9.6  2.0 - 12.0 % Final    Eosinophils % 07/26/2022 1.2  0.0 - 6.0 % Final    Basophils % 07/26/2022 0.9  0.0 - 2.0 % Final    Neutrophils Absolute 07/26/2022 4.94  1.80 - 7.30 E9/L Final    Immature Granulocytes # 07/26/2022 0.03  E9/L Final    Lymphocytes Absolute 07/26/2022 1.62  1.50 - 4.00 E9/L Final    Monocytes Absolute 07/26/2022 0.72  0.10 - 0.95 E9/L Final    Eosinophils Absolute 07/26/2022 0.09  0.05 - 0.50 E9/L Final    Basophils Absolute 07/26/2022 0.07  0.00 - 0.20 E9/L Final    Sodium 07/26/2022 138  132 - 146 mmol/L Final    Potassium reflex Magnesium 07/26/2022 4.1  3.5 - 5.0 mmol/L Final    Chloride 07/26/2022 105  98 - 107 mmol/L Final    CO2 07/26/2022 23  22 - 29 mmol/L Final    Anion Gap 07/26/2022 10  7 - 16 mmol/L Final    Glucose 07/26/2022 92  74 - 99 mg/dL Final    BUN 07/26/2022 9  6 - 20 mg/dL Final    Creatinine 07/26/2022 0.7  0.5 - 1.0 mg/dL Final    GFR Non- 07/26/2022 >60  >=60 mL/min/1.73 Final    Comment: Chronic Kidney Disease: less than 60 ml/min/1.73 sq.m. Kidney Failure: less than 15 ml/min/1.73 sq.m. Results valid for patients 18 years and older.       GFR  07/26/2022 >60   Final    Calcium 07/26/2022 8.6  8.6 - 10.2 mg/dL Final    Total Protein 07/26/2022 6.4  6.4 - 8.3 g/dL Final    Albumin 07/26/2022 3.8  3.5 - 5.2 g/dL Final    Total Bilirubin 07/26/2022 <0.2  0.0 - 1.2 mg/dL Final    Alkaline Phosphatase 07/26/2022 45  35 - 104 U/L Final    ALT 07/26/2022 10 0 - 32 U/L Final    AST 07/26/2022 13  0 - 31 U/L Final    SARS-CoV-2 RNA, RT PCR 07/26/2022 NOT DETECTED  NOT DETECTED Final    Comment: Not Detected results do not preclude SARS-CoV-2 infection and  should not be used as the sole basis for patient management  decisions. Results must be combined with clinical observations,  patient history, and epidemiological information. Testing was performed using VISHNU PRABHAKAR SARS-CoV-2 and Influenza A/B  nucleic acid assay. This test is a multiplex Real-Time Reverse  Transcriptase Polymerase Chain Reaction (RT-PCR)-based in vitro  diagnostic test intended for the qualitative detection of nucleic  acids from SARS-CoV-2, influenza A, and influenza B in nasopharyngeal  and nasal swab specimens for use under the FDAs Emergency Use  Authorization (EUA) only.     Patient Fact Sheet:  FindDrives.pl  Provider Fact Sheet: FindDrives.pl  EUA: FindDrives.pl  IFU: FindDrives.pl    Methodology:  RT-PCR      INFLUENZA A 07/26/2022 NOT DETECTED  NOT DETECTED Final    INFLUENZA B 07/26/2022 NOT DETECTED  NOT DETECTED Final    QC OK? 07/26/2022 yes   Final    Color, UA 07/26/2022 Yellow  Straw/Yellow Final    Clarity, UA 07/26/2022 Clear  Clear Final    Glucose, Ur 07/26/2022 Negative  Negative mg/dL Final    Bilirubin Urine 07/26/2022 Negative  Negative Final    Ketones, Urine 07/26/2022 Negative  Negative mg/dL Final    Specific Gravity, UA 07/26/2022 1.015  1.005 - 1.030 Final    Blood, Urine 07/26/2022 Negative  Negative Final    pH, UA 07/26/2022 6.5  5.0 - 9.0 Final    Protein, UA 07/26/2022 Negative  Negative mg/dL Final    Urobilinogen, Urine 07/26/2022 0.2  <2.0 E.U./dL Final    Nitrite, Urine 07/26/2022 Negative  Negative Final    Leukocyte Esterase, Urine 07/26/2022 Negative  Negative Final    WBC, UA 07/26/2022 NONE  0 - 5 /HPF Final    RBC, UA 07/26/2022 0-1  0 - 2 /HPF Final    Bacteria, UA 07/26/2022 NONE SEEN  None Seen /HPF Final    Amphetamine Screen, Urine 07/26/2022 NOT DETECTED  Negative <1000 ng/mL Final    Barbiturate Screen, Ur 07/26/2022 NOT DETECTED  Negative < 200 ng/mL Final    Benzodiazepine Screen, Urine 07/26/2022 NOT DETECTED  Negative < 200 ng/mL Final    Cannabinoid Scrn, Ur 07/26/2022 NOT DETECTED  Negative < 50ng/mL Final    Cocaine Metabolite Screen, Urine 07/26/2022 NOT DETECTED  Negative < 300 ng/mL Final    Opiate Scrn, Ur 07/26/2022 NOT DETECTED  Negative < 300ng/mL Final    Note:  The Opiate Screen is not intended to detect Oxycodone. PCP Screen, Urine 07/26/2022 NOT DETECTED  Negative < 25 ng/mL Final    Methadone Screen, Urine 07/26/2022 NOT DETECTED  Negative <300 ng/mL Final    Oxycodone Urine 07/26/2022 NOT DETECTED  Negative <100 ng/mL Final    FENTANYL SCREEN, URINE 07/26/2022 NOT DETECTED  Negative <1 ng/mL Final    Drug Screen Comment: 07/26/2022 see below   Final    Comment: These drug screen results are for medical purposes only and  should not be considered definitive or confirmed. The drug  methodology concentration value must be greater than or equal  to the cutoff to be reported as positive. Confirmatory testing  orders and/or interpretive screening questions can be directed  to toxicology at [phone]. The absence of expected drug(s) and/or metabolite(s) may be due  to inappropriate timing of specimen collection relative to drug  administration, poor drug absorption, diluted/adulterated urine,  or limitations of screening testing methodology.       Ethanol Lvl 07/26/2022 <10  mg/dL Final    Not Detected    Acetaminophen Level 07/26/2022 <5.0 (A) 10.0 - 30.0 mcg/mL Final    Salicylate, Serum 51/18/1209 <0.3  0.0 - 30.0 mg/dL Final    TCA Scrn 07/26/2022 NEGATIVE  Cutoff:300 ng/mL Final    Ammonia 07/26/2022 69.0 (A) 11.0 - 51.0 umol/L Final    Valproic Acid Lvl 07/26/2022 129 (A) 50 - 100 mcg/mL Final    Valproic Acid Lvl 07/26/2022 53  50 - 100 mcg/mL Final    Meter Glucose 07/26/2022 89  74 - 99 mg/dL Final    Valproic Acid Lvl 07/26/2022 185 (A) 50 - 100 mcg/mL Final    Ammonia 07/26/2022 <10.0 (A) 11.0 - 51.0 umol/L Final           MENTAL STATUS EXAMINATION  Female appears age. Pleasant, cooperative, forthcoming. Normal psychomotor act 70, strength, tone, eye contact. Gait not assessed. Mood euthymic. Affect mildly restricted. Speech clear. Thoughts organized without loosening of associations. Content future oriented. No active suicidal or homicidal ideations. No paranoia, delusions or hallucinations. Orientation and concentration recent and remote memory are grossly intact. Fund of knowledge fair. Language use fair. Insight and judgment fair. ASSESSMENT:  Major Depression Disorder      PLAN & RECOMMENDATIONS: Patient currently appropriate and not felt to be an imminent risk of danger to herself. She is not actively suicidal and remorseful of her her overdose and admits it was an impulsive act. She has never attempted suicide or over dosed in her history before. Patient lives with her mother and is very supportive. Spoke with patient about outpatient counseling and she is open to this at [facility] or somewhere close to her home in [facility]. I will ask social work to set up an appointment for outpatient counseling and I will restart her Zoloft 25 mg daily for depression. Support provided. I am going to stop the constant observation and pink slip. Okay to discharge home from my standpoint. Patient to follow up with outpatient services.     Time spent 40 min      Cassell Cooks, APRN - CNP 7/26/2022 11:54 AM

## 2022-07-26 NOTE — ED NOTES
ASSIGNED [id number] TO ELISEO IN ADMITTING  - PENDING PREGNANCY TEST     Vazquez Amezquita, [facility]  07/26/22 100 Sydnee Arana, [facility]  07/26/22 1806

## 2022-07-26 NOTE — ED NOTES
Discussed case with Dr. Nanda Hardy / Prosper Kim NP Patient to be accepted to  [facility] diagnosis of MDD. Please ensure original pink slip / voluntary admission accompanies patient's chart. Kadi/ Kar PATEL to inform primary care RN of further admission details once bed is available.        Sb Burger RN  07/26/22 0559

## 2022-07-26 NOTE — ED NOTES
Lindsey Villanueva NP assessed this pt and determined that pt can return home and follow up with out pt services    Discussed with Dr. Bautista Thayer who advised that pt attempted suicide and discharge status needs to be discussed with him by [facility].     Awaiting call from [facility] dubois to assist with this matter       AMANDA Mendez  07/26/22 7799

## 2022-07-26 NOTE — ED NOTES
1 pt belonging bag secured in locker 30 in [facility]. Pt depakote pills secured in red bag and in medication room.       Gini Dance, RN  07/25/22 4205

## 2022-07-26 NOTE — ED NOTES
Behavioral Health Crisis Assessment      Chief Complaint: \"I attempted suicide\"    Mental Status Exam: Pt calm and cooperative, full affect. Pt A&Ox4, good eye contact. Pt speech is normal in pace and tone. Pt thought processes are linear, denies current SI/HI/AVH. Pt reports normal sleep and appetite. Legal Status  [] Voluntary:  [x] Involuntary, Issued by: ED physician    Herberth chandler states: \"pt has hx of depression, is on depakote. She was feeling depressed due to everything going on in the world and took approx. 30 pills of her depakote in a suicide attempt. \"    Gender  [] Male [x] Female [] Transgender  [] Other    Sexual Orientation    [x] Heterosexual [] Homosexual [] Bisexual [] Other    Brief Clinical Summary: Pt reports that she attempted suicide last night. She reports that she did this because she is \"tired of everything\". Pt reports that she is always depressed and crying, that she just feels this way, denies having any triggers. Pt denies having any stressors other than the news, reports that she tries to stay away from this. Pt reports that she has a diagnosis of MS. Pt denies any previous suicide attempts, denies self-harm behaviors. Pt denies any mental health history/diagnosis/outpatient provider/inpatient psych stays (none at [facility] per chart). Pt reports that her depression is new, that she would like to start going to a counselor for help. Pt denies a trauma history, denies any legal issues, denies any abuse issues. Pt denies using any drugs/alcohol, UDS negative.     Collateral Information: N/A    Risk Factors: suicide attempt  Physical health dx- MS  Not active with outpatient provider  No income/job    Protective Factors: Support from mom  Safe/stable housing  Access to essential needs  Help-seeking bx  Denies a trauma hx  Denies hx of inpatient psych admissions    Suicidal Ideations:   [] Reports:    [x] Past [] Present   [x] Denies    Suicide Attempts:  [x] Reports: one attempt yesterday  [] Denies    C-SSRS Screening Completed by RN: Current Suicide Risk:  [] No Risk [] Low [] Moderate [x] High    Homicidal Ideations  [] Reports:   [] Past [] Present   [x] Denies     Self Injurious/Self Mutilation Behaviors:   [] Reports:    [] Past [] Present   [x] Denies    Hallucinations/Delusions   [] Reports:   [x] Denies     Substance Use/Alcohol Use/Addiction:   [] Reports:   [x] Denies   [x] SBIRT Screen Complete. Current or Past Substance Abuse Treatment  [] Yes, When and Where:  [x] No    Current or Past Mental Health Treatment:  [] Yes, When and Where:  [x] No    Legal Issues:  []  Yes (Specify)  [x]  No    Access to Weapons:  []  Yes (Specify)  [x]  No    Trauma History  [] Reports:  [x] Denies     Living Situation: lives with mom    Employment: Unemployed- no income    Education Level: High school    Violence Risk Screening:        Have you ever thought about hurting someone? [x]  No  []  Yes (Ask the questions listed below)   When? Did you follow through with the thoughts? [] No     [] Yes- When and what happened? 2.  Have you ever threatened anyone? [x]  No  []  Yes (Ask the questions listed below)   When and what happened? Have you ever threatened someone with a gun, knife or other weapon? []  No  []  Yes - When and what happened? 2. Have you ever had an order of protection taken out against you? []  Yes [x]  No  3. Have you ever been arrested due to violence? []  Yes [x]  No  4. Have you ever been cruel to animals?  []  Yes [x]  No    After consideration of C-SSRS screening results, C-SSRS assessments, and this professional's assessment the patient's overall suicide risk assessed to be:  [] No Risk  [] Low   [] Moderate   [x] High     [x] Discussed current suicide risk, protective and risk factors with RN and ED Physician     Disposition   [] Home:   [] Outpatient Provider:   [] Crisis Unit:   [x] Inpatient Psychiatric Unit: Pt needs inpatient hospitalization for further safety and stabilization.    [] Other:

## 2022-07-27 PROBLEM — F31.81 BIPOLAR 2 DISORDER, MAJOR DEPRESSIVE EPISODE (HCC): Status: ACTIVE | Noted: 2022-07-27

## 2022-07-27 PROBLEM — T14.91XA SUICIDE ATTEMPT (HCC): Status: ACTIVE | Noted: 2022-07-27

## 2022-07-27 LAB — VALPROIC ACID LEVEL: 18 MCG/ML (ref 50–100)

## 2022-07-27 PROCEDURE — 1240000000 HC EMOTIONAL WELLNESS R&B

## 2022-07-27 PROCEDURE — 80164 ASSAY DIPROPYLACETIC ACD TOT: CPT

## 2022-07-27 PROCEDURE — 90792 PSYCH DIAG EVAL W/MED SRVCS: CPT | Performed by: NURSE PRACTITIONER

## 2022-07-27 PROCEDURE — 36415 COLL VENOUS BLD VENIPUNCTURE: CPT

## 2022-07-27 RX ORDER — MULTIVIT-MIN/IRON/FOLIC ACID/K 18-600-40
1 CAPSULE ORAL DAILY
COMMUNITY

## 2022-07-27 ASSESSMENT — SLEEP AND FATIGUE QUESTIONNAIRES
DO YOU USE A SLEEP AID: NO
AVERAGE NUMBER OF SLEEP HOURS: 8
DO YOU HAVE DIFFICULTY SLEEPING: NO

## 2022-07-27 ASSESSMENT — PAIN SCALES - GENERAL
PAINLEVEL_OUTOF10: 0
PAINLEVEL_OUTOF10: 0

## 2022-07-27 NOTE — PLAN OF CARE
Denies suicidal thoughts or intent to harm herself or others. No voiced delusions. Denies hallucinations. No voiced delusions. Problem: Depression  Goal: Will be euthymic at discharge  Description: INTERVENTIONS:  1. Administer medication as ordered  2. Provide emotional support via 1:1 interaction with staff  3. Encourage involvement in milieu/groups/activities  4.  Monitor for social isolation  7/27/2022 1145 by Wenceslao Pena RN  Outcome: Not Progressing

## 2022-07-27 NOTE — PLAN OF CARE
Problem: Self Harm/Suicidality  Goal: Will have no self-injury during hospital stay  Description: INTERVENTIONS:  1. Q 30 MINUTES: Routine safety checks  2. Q SHIFT & PRN: Assess risk to determine if routine checks are adequate to maintain patient safety  Outcome: Progressing Towards Goal     NO SELF HARM. PT. DENIED SUICIDAL IDEATIONS ON A.M. ASSESSMENT. Problem: Depression  Goal: Will be euthymic at discharge  Description: INTERVENTIONS:  1. Administer medication as ordered  2. Provide emotional support via 1:1 interaction with staff  3. Encourage involvement in milieu/groups/activities  4. Monitor for social isolation  Outcome: Not Progressing Towards Goal  MOOD DEPRESSED. DECLINED BREAKFAST. [facility]  Initial Interdisciplinary Treatment Plan NOTE    Review Date & Time: 7/27/22 1000    Patient was not in treatment team    Admission Type:   Admission Type:  Involuntary    Reason for admission:  Reason for Admission: \" I tried to commit suicide by taking a lot of medication\"      Estimated Length of Stay Update:   5 DAYS  Estimated Discharge Date Update:  MONDAY    EDUCATION:   Learner Progress Toward Treatment Goals: Reviewed results and recommendations of this team    Method: Small group    Outcome: Needs reinforcement    PATIENT GOALS: \"CATCH UP ON SLEEP\"    PLAN/TREATMENT RECOMMENDATIONS UPDATE:  initiate mediations, supportive care, suicide risk assessments, groups, discharge planning and follow up    GOALS UPDATE:   Time frame for Short-Term Goals:   5 days    Delvin Rodriguez RN

## 2022-07-27 NOTE — CARE COORDINATION
Biopsychosocial Assessment Note    Social work met with patient to complete the biopsychosocial assessment and C-SSRS. Chief Complaint: \"I attempted Suicide\"    Mental Status Exam: Pt is calm and cooperative with sw, depressed mood with flat/sad affect. Pt withdrawn, but cooperative. Pt has circumstantial thought processes with poverty of content. Pt denies SI/HI/AVH. Clinical Summary: Pt Reports that she is here for a suicide attempt. Pt reports that this was triggered by her being tired of everything, that she always feels depressed, but does not have any mental health services in place. Pt reports that she has her mom as her support, reports that the people here are also her support. Pt denies any past/current abuse/trauma issues, denies any legal issues, denies substance use. Pt reports that she does have spiritual/Yazidi beliefs, reports that she enjoys yoga, reading and meditation. Pt denies having little interest/pleasure in doing things and denies feeling hopeless/helpless. Pt denies any previous suicide attempts and denies self-harm behaviors. Pt reports that her mom has diabetes and that she is her caregiver.      Risk Factors: Dx of MS  Lack of income  Lack of work    Protective Factors: Safe/stable housing  Support from mom  Access to essential needs  Denies trauma hx  Denies substance use    Gender  [] Male [x] Female [] Transgender  [] Other    Sexual Orientation    [x] Heterosexual [] Homosexual [] Bisexual [] Other    Suicidal Ideation  [x] Past [] Present [x] Denies     C-SSRS Screening Completed: Current Suicide Risk:  [] No Risk  [] Low [] Moderate [x] High    Homicidal Ideation  [] Past [] Present [x] Denies     Hallucinations/Delusions (Specify type)  [] Reports [x] Denies     Current or Past Mental Health Treatment:  [] Yes, When and Where:   [x] No    Substance Use/Alcohol Use/Addiction  [] Reports [x] Denies     Tobacco Use (within the last 6 months)  [] Reports [x] Denies Trauma History  [] Reports [x] Denies     Self Injurious/Self Mutilation Behaviors:   [] Reports:    [] Past [] Present   [x] Denies    Legal History:  []  Yes (Specify)    [x] No    Collateral Contact (CLAUDINE signed)  Name: Karla Dimas  Relationship: Mom  Number:     Collateral Information:      Access to Weapons per Collateral Contact: [] Reports [] Denies     After consideration of C-SSRS screening results, C-SSRS assessments, and this professional's assessment the patient's overall suicide risk assessed to be:  [] None   [] Low   [x] Moderate   [] High     [x] Discussed current suicide risk, protective and risk factors with RN and NP/Psychiatrist.    Discharge Plan:  [x] Home: Home with mom, mom to .   [] Shelter:  [] Crisis Unit:  [] Substance Abuse Rehab:  [] Nursing Facility:  [] Other (Specify): Follow up Provider: Pt is not active outpatient, would like referrals close to her home.

## 2022-07-27 NOTE — PROGRESS NOTES
[facility]  Admission Note     Patient arrived to unit at 80. Patient was calm and cooperative with staff members during transfer. Patient is a 39 y/o female that verbalized reason for admission was intentional OD of Depakote that they received on 7/25/22. They reported stressors at home but were unwilling to further explain to this RN. Patient reports a previous trial of Sertraline that was ended in April of 2022. Patient presented with a flat and blunted affect. Patient reported that they live at home with mother and father. Patient denied SI, HI, Depression, Anxiety, and AVH during initial assessment. KD reported that this is their initial IP admission. Patient contracted for safety, was oriented to unit, and was offered food and drink. Pt reported medical history of Multiple Sclerosis. Admission Type:   Admission Type:  Involuntary    Reason for admission:  Reason for Admission: \" I tried to commit suicide by taking a lot of medication\"    PATIENT STRENGTHS:       Patient Strengths and Limitations:       Addictive Behavior:   Addictive Behavior  In the Past 3 Months, Have You Felt or Has Someone Told You That You Have a Problem With  : None    Medical Problems:   Past Medical History:   Diagnosis Date    Chronic back pain     Delayed growth and development     Heel spur     Right    Multiple sclerosis ([facility] [address] 07/26/2022    STD (female)     unknown as per pt; at Dr Gayle Davies:  Mental Status and Behavioral Exam  Normal: No  Level of Assistance: Independent/Self  Facial Expression: Flat, Sad  Affect: Blunt  Level of Consciousness: Alert  Frequency of Checks: 4 times per hour, close  Mood:Normal: No  Mood: Depressed, Worthless, low self-esteem  Motor Activity:Normal: Yes  Eye Contact: Fair  Observed Behavior: Withdrawn, Cooperative  Sexual Misconduct History: Current - no  Preception: [place] to person, [place] to time, [place] to place, [place] to situation  Attention:Normal: January 2, 2018    To whom it May concern,     Please note that Janet Noelle was not at work today due to his grandmother's medical illness requiring hospitalization  He his presence was necessary given he is the power of   Please contact me with any questions      Sincerely,   Brooklynn Burr DNP, [address] [place] [address] Internal Medicine  [phone] surroundings and program expectations and copy of patient rights given. Received admission packet:  Yes. Consents reviewed, signed Yes. Refused NO. Patient verbalize understanding:  Yes. Patient education on precautions:  Yes                   Omid Landa RN

## 2022-07-27 NOTE — H&P
Department of Psychiatry  History and Physical - Adult     CHIEF COMPLAINT:      Patient was seen after discussing with the treatment team and reviewing the chart    CIRCUMSTANCES OF ADMISSION:     HISTORY OF PRESENT ILLNESS:      The patient is a 40 y.o. female with significant past history of multiple sclerosis, chronic back pain, major depressive disorder presented to the ED brought in by EMS after after patient attempted suicide by taking half a bottle of Depakote. In the ED her valproic acid level was 185 which decreased to 18 today. In the ED her urine drug screen is negative her blood alcohol level is negative and she was medically cleared admitted to [address] [place] [address] psychiatric unit for further psychiatric assessment stabilization and treatment. Upon evaluation today patient states that her PCP had started her on Zoloft in the past but is not currently taking it. When asked about the Depakote that she overdosed on she states that was prescribed to the MS Dr. She was unable to say exactly why her neurologist who prescribed Depakote for her MS however patient did indicate that she is been having more impulsivity she is been having racing thoughts and that her mood swings were 1 minute she is okay the next minute she can be very angry. She is unable to cite any specific stressor of why she overdosed. She states that her mother called the ambulance she indicates that she did not specifically tell her mother that she overdosed but that her mother asked her about her pill bottles and that her mom saw the bottle was empty she called the ambulance. Patient is very flat and blunted despondent she is not forthcoming information. According to nursing note patient indicated to nursing the patient has stressors at home but was not willing to further explain what the stressors were.   She remains flat blunted not forthcoming with information she does not indicate any stressors on assessment does not give any reason why she would overdose. Very limited insight and judgment denies current suicidal ideation denies auditory or visual hallucinations      PAST PSYCHIATRIC HISTORY: Denies any outpatient or inpatient psychiatric treatment. Denies suicidal attempts before. Was prescribed Zoloft in her past by her PCP but is currently not taking them. Family psychiatric history: Patient denies any family history of mental illness or any when the family committed suicide    Legal history: Patient denies any legal history    Substance abuse history: Patient has any drug or alcohol use urine drug screen blood alcohol level were negative    Personal family and social history: Patient states she grew up in [facility] was raised by her mother she graduated high school states she is single with no kids currently lives with her mom she has not worked received Social Security disability      Past Medical History:        Diagnosis Date    Chronic back pain     Delayed growth and development     Heel spur     Right    Multiple sclerosis ([facility] [address] 07/26/2022    STD (female)     unknown as per pt; at Dr Onesimo Zamudio       Medications Prior to Admission:   Medications Prior to Admission: Cholecalciferol (VITAMIN D) 50 MCG (2000 UT) CAPS capsule, Take 1 capsule by mouth in the morning.   divalproex (DEPAKOTE) 250 MG DR tablet, TAKE 1 TABLET BY MOUTH TWICE DAILY  TECFIDERA 240 MG delayed release capsule, TAKE 1 CAPSULE (240MG) BY  MOUTH TWICE DAILY (Patient not taking: No sig reported)  sertraline (ZOLOFT) 25 MG tablet, Take 1 tablet by mouth daily (Patient not taking: No sig reported)  fluticasone (FLONASE) 50 MCG/ACT nasal spray, 2 sprays by Nasal route daily (Patient not taking: Reported on 7/26/2022)  Multiple Vitamins-Minerals (THERAPEUTIC MULTIVITAMIN-MINERALS) tablet, Take 1 tablet by mouth daily  aspirin 81 MG tablet, Take 81 mg by mouth daily     Past Surgical History:        Procedure Laterality Date    FOOT SURGERY      bone spur, Dr Saw Corbett Allergies:   Patient has no known allergies. Family History  Family History   Problem Relation Age of Onset    Diabetes Mother     Hypertension Mother     High Cholesterol Mother     Diabetes Maternal Grandmother     Diabetes Maternal Grandfather     Cancer Maternal Grandfather         lymphoma     Hypertension Brother     High Cholesterol Brother     Cancer Maternal Aunt         breast cancer diagnosed 42yo             EXAMINATION:    REVIEW OF SYSTEMS:    ROS:  [x] All negative/unchanged except if checked.  Explain positive(checked items) below:  [] Constitutional  [] Eyes  [] Ear/Nose/Mouth/Throat  [] Respiratory  [] CV  [] GI  []   [] Musculoskeletal  [] Skin/Breast  [] Neurological  [] Endocrine  [] Heme/Lymph  [] Allergic/Immunologic    Explanation:     Vitals:  BP (!) 119/57   Pulse 90   Temp 98.4 °F (36.9 °C) (Oral)   Resp 15   Ht 5' 7\" (1.702 m)   Wt 155 lb (70.3 kg)   LMP  (LMP Unknown)   SpO2 99%   BMI 24.28 kg/m²      Physical Examination:   Head: x  Atraumatic: x normocephalic  Skin and Mucosa        Moist x  Dry   Pale  x Normal   Neck:  Thyroid  Palpable   x  Not palpable   venus distention   adenopathy   Chest: x Clear   Rhonchi     Wheezing   CV:  xS1   xS2    xNo murmer   Abdomen:  x  Soft    Tender    Viceromegaly   Extremities:  x No Edema     Edema     Cranial Nerves Examination:   CN II:   xPupils are reactive to light  Pupils are non reactive to light  CN III, IV, VI:  xNo eye deviation    No diplopia or ptosis   CN V:    xFacial Sensation is intact     Facial Sensation is not intact   CN IIIV:   x Hearing is normal to rubbing fingers   CN IX, X:     xNormal gag reflex and phonation   CN XI:   xShoulder shrug and neck rotation is normal  CNXII:    xTongue is midline no deviation or atrophy    Mental Status Examination:    Level of consciousness:  within normal limits   Appearance:  fair grooming and fair hygiene  Behavior/Motor:  no abnormalities noted  Attitude toward examiner: Uncooperative and not forthcoming with information  Speech: Slow, low in tone  Mood: \" I am depressed. \"  Affect: Flat and blunted thought processes: Linear thought flight of ideas loose associations  Thought content: Devoid of any auditory visual hallucinations delusions or other perceptual normalities. Denies SI/HI intent or plan, patient has significant suicide attempt prior to coming to the ED  Cognition:  oriented to person, place, and time   Concentration intact  Insight Limited  Judgement Limited      DIAGNOSIS:  Bipolar 2 major depressive episode      LABS: REVIEWED TODAY:  Recent Labs     07/26/22 0125   WBC 7.5   HGB 12.5        Recent Labs     07/26/22 0125      K 4.1      CO2 23   BUN 9   CREATININE 0.7   GLUCOSE 92     Recent Labs     07/26/22 0125   BILITOT <0.2   ALKPHOS 45   AST 13   ALT 10     Lab Results   Component Value Date/Time    LABAMPH NOT DETECTED 07/26/2022 04:54 AM    BARBSCNU NOT DETECTED 07/26/2022 04:54 AM    LABBENZ NOT DETECTED 07/26/2022 04:54 AM    LABMETH NOT DETECTED 07/26/2022 04:54 AM    OPIATESCREENURINE NOT DETECTED 07/26/2022 04:54 AM    PHENCYCLIDINESCREENURINE NOT DETECTED 07/26/2022 04:54 AM    ETOH <10 07/26/2022 01:25 AM     Lab Results   Component Value Date/Time    TSH 0.803 04/07/2022 11:30 AM     No results found for: LITHIUM  Lab Results   Component Value Date    VALPROATE 18 (L) 07/27/2022     Lab Results   Component Value Date/Time    VALPROATE 18 07/27/2022 08:02 AM         Radiology No results found. TREATMENT PLAN:    Risk Management: Based on the diagnosis and assessment biopsychosocial treatment model was presented to the patient and was given the opportunity to ask any question. The patient was agreeable to the plan and all the patient's questions were answered to the patient's satisfaction. I discussed with the patient the risk, benefit, alternative and common side effects for the proposed medication treatment.   The patient is consenting to this treatment. Collateral Information:  Will obtain collateral information from the family or friends. Will obtain medical records as appropriate from out patient providers  Will consult the hospitalist for a physical exam to rule out any co-morbid physical condition. Home medication Reconciled       New Medications started during this admission :        Prn Haldol 5mg and Vistaril 50mg q6hr for extreme agitation. Trazodone as ordered for insomnia  Vistaril as ordered for anxiety      Psychotherapy:   Encourage participation in milieu and group therapy  Individual therapy as needed    Patient's diagnosis, treatment plan, medication management was formulated at the end of evaluation and after reviewing relevant documentation. Patient was seen directly by myself and Dr. Jacey Lenz    Patient is already been started on Zoloft 25 mg daily by nurse practitioner in the ED can continue  We will start back on Depakote 250 mg twice daily was started by neurology to help with manic features of bipolar disorder      Can discontinue constant observer. Patient is deemed to be moderate risk for suicide based on productive risk factors as well as risk mitigation    Risk Factors: Dx of MS  Lack of income  Lack of work     Protective Factors: Safe/stable housing  Support from mom  Access to essential needs  Denies trauma hx  Denies substance use              Behavioral Services  Medicare Certification Upon Admission    I certify that this patient's inpatient psychiatric hospital admission is medically necessary for:    [x] (1) Treatment which could reasonably be expected to improve this patient's condition,       [] (2) Or for diagnostic study;     AND     [x](2) The inpatient psychiatric services are provided while the individual is under the care of a physician and are included in the individualized plan of care.     Estimated length of stay/service 3 to 7 days based on stability    Plan for post-hospital care outpatient psychiatric and counseling services    Electronically signed by EUSEBIO Carreno CNP on 1/88/9962 at 4:13 PM        Electronically signed by EUSEBIO Carreno CNP on 6/84/6896 at 4:00 PM

## 2022-07-27 NOTE — GROUP NOTE
Group Therapy Note    Date: 7/27/2022    Group Start Time: 6465  Group End Time: 3974  Group Topic: Cognitive Skills    [facility]  [address]        Group Therapy Note    Attendees: 12       Patient's Goal:  Pt will discuss cognitive distortions and learn ways to challenge negative thoughts. Notes:  Pt was an active participant in group discussion. Status After Intervention:  Improved    Participation Level: Active Listener and Interactive    Participation Quality: Appropriate and Attentive      Speech:  normal      Thought Process/Content: Logical  Linear      Affective Functioning: Congruent      Mood: anxious and depressed      Level of consciousness:  Alert, Oriented x4, and Attentive      Response to Learning: Able to verbalize current knowledge/experience, Able to verbalize/acknowledge new learning, Able to retain information, Capable of insight, and Progressing to goal      Endings: None Reported    Modes of Intervention: Education, Support, Socialization, Exploration, Clarifying, and Problem-solving      Discipline Responsible: /Counselor      Signature:   Mariluz Mohamud

## 2022-07-28 DIAGNOSIS — G35 MULTIPLE SCLEROSIS (HCC): ICD-10-CM

## 2022-07-28 PROCEDURE — 1240000000 HC EMOTIONAL WELLNESS R&B

## 2022-07-28 PROCEDURE — 6370000000 HC RX 637 (ALT 250 FOR IP): Performed by: PSYCHIATRY & NEUROLOGY

## 2022-07-28 PROCEDURE — 99232 SBSQ HOSP IP/OBS MODERATE 35: CPT | Performed by: NURSE PRACTITIONER

## 2022-07-28 PROCEDURE — 6370000000 HC RX 637 (ALT 250 FOR IP): Performed by: NURSE PRACTITIONER

## 2022-07-28 RX ORDER — CHOLECALCIFEROL (VITAMIN D3) 50 MCG
2000 TABLET ORAL DAILY
Status: DISCONTINUED | OUTPATIENT
Start: 2022-07-28 | End: 2022-08-01 | Stop reason: HOSPADM

## 2022-07-28 RX ORDER — ASPIRIN 81 MG/1
81 TABLET ORAL DAILY
Status: DISCONTINUED | OUTPATIENT
Start: 2022-07-28 | End: 2022-08-01 | Stop reason: HOSPADM

## 2022-07-28 RX ORDER — DIMETHYL FUMARATE 240 MG/1
1 CAPSULE ORAL 2 TIMES DAILY
Status: DISCONTINUED | OUTPATIENT
Start: 2022-07-28 | End: 2022-08-01 | Stop reason: HOSPADM

## 2022-07-28 RX ORDER — DIMETHYL FUMARATE 240 MG/1
CAPSULE ORAL
Qty: 180 CAPSULE | Refills: 3 | Status: SHIPPED
Start: 2022-07-28 | End: 2022-08-11

## 2022-07-28 RX ADMIN — Medication 2000 UNITS: at 11:48

## 2022-07-28 RX ADMIN — DIMETHYL FUMARATE 240 MG: 240 CAPSULE ORAL at 11:48

## 2022-07-28 RX ADMIN — ACETAMINOPHEN 325MG 650 MG: 325 TABLET ORAL at 14:12

## 2022-07-28 RX ADMIN — DIMETHYL FUMARATE 240 MG: 240 CAPSULE ORAL at 20:57

## 2022-07-28 RX ADMIN — ASPIRIN 81 MG: 81 TABLET, COATED ORAL at 11:49

## 2022-07-28 RX ADMIN — MELATONIN 3 MG ORAL TABLET 3 MG: 3 TABLET ORAL at 20:56

## 2022-07-28 ASSESSMENT — PAIN DESCRIPTION - LOCATION: LOCATION: HEAD

## 2022-07-28 ASSESSMENT — PAIN SCALES - GENERAL
PAINLEVEL_OUTOF10: 0
PAINLEVEL_OUTOF10: 0
PAINLEVEL_OUTOF10: 10

## 2022-07-28 ASSESSMENT — PAIN - FUNCTIONAL ASSESSMENT: PAIN_FUNCTIONAL_ASSESSMENT: ACTIVITIES ARE NOT PREVENTED

## 2022-07-28 ASSESSMENT — PAIN DESCRIPTION - DESCRIPTORS: DESCRIPTORS: ACHING

## 2022-07-28 NOTE — PLAN OF CARE
Problem: Self Harm/Suicidality  Goal: Will have no self-injury during hospital stay  Description: INTERVENTIONS:  1. Q 30 MINUTES: Routine safety checks  2. Q SHIFT & PRN: Assess risk to determine if routine checks are adequate to maintain patient safety  7/28/2022 1738 by Jaciel Byers RN  Outcome: Progressing  7/28/2022 1213 by Stacy Dyson RN  Outcome: Progressing  7/28/2022 0536 by Grace Mancilla RN  Outcome: Progressing     Problem: Pain  Goal: Verbalizes/displays adequate comfort level or baseline comfort level  7/28/2022 1738 by Jaciel Byers RN  Outcome: Progressing  7/28/2022 0536 by Grace Mancilla RN  Outcome: Progressing             Self directed with shower and ADL's. Out in DR, sitting at table playing table games with peers. Denies suicidal thoughts or intent to harm herself or others. No voiced delusions. Denies hallucinations. Smiling upon approach.

## 2022-07-28 NOTE — CARE COORDINATION
JAGDISH contacted pt mom [id number]  (CLAUDINE signed). Raj Mendoza is unsure of what was going on with pt prior to admission. She reports pt has been very lonely and she doesn't get out much. Mom reports pt used to be wild back in the day. She reports pt is very sensitive right now especially with what is going on in the world. Raj Mendoza reports they will be watching TV and then pt will start crying about something she saw, mom stated it isnt something most people would cry over. Raj Mendoza reports pt does live with her, she can come home, she will be picking her up, and there are no guns or weapons in the home. Mom reports pt is almost always with her mom which is weird because pt is normally very independent. Mom reports pt does see a MS doctor and she has made comments about not needing the medications. Susanne expressed no general or safety concerns for pt. No other questions or concerns, offered to assist as needed.

## 2022-07-28 NOTE — GROUP NOTE
Group Therapy Note    Date: 7/28/2022    Group Start Time: 1100  Group End Time: 6798  Group Topic: Psychotherapy    [facility]    Mariluz Leigh        Group Therapy Note    Attendees: 8       Patient's Goal: To increase socialization and improve interpersonal relationships. Notes:  Pt was an active participant in group discussion. Status After Intervention:  Improved    Participation Level: Active Listener and Interactive    Participation Quality: Appropriate, Attentive, Sharing, and Supportive      Speech:  normal      Thought Process/Content: Logical  Linear      Affective Functioning: Congruent      Mood: anxious and depressed      Level of consciousness:  Alert, Oriented x4, and Attentive      Response to Learning: Able to verbalize current knowledge/experience, Able to verbalize/acknowledge new learning, Able to retain information, Capable of insight, and Progressing to goal      Endings: None Reported    Modes of Intervention: Support, Socialization, and Exploration      Discipline Responsible: /Counselor      Signature:   Mariluz Leigh

## 2022-07-28 NOTE — GROUP NOTE
Group Therapy Note    Date: 7/28/2022    Group Start Time: 1020  Group End Time: 1100  Group Topic: Psychoeducation    [facility] [address]        Group Therapy Note    Attendees:  12       Notes: Active and engaged during stress exploration discussion. Pleasant in sharing experiences with peers. Status After Intervention:  Improved    Participation Level:  Active Listener and Interactive    Participation Quality: Appropriate, Attentive, and Sharing      Speech:  normal      Thought Process/Content: Logical      Affective Functioning: Congruent      Mood: euthymic      Level of consciousness:  Alert and Attentive      Response to Learning: Progressing to goal      Endings: None Reported    Modes of Intervention: Education, Support, Socialization, and Exploration      Discipline Responsible: Psychoeducational Specialist      Signature:  [place], [address]

## 2022-07-28 NOTE — PROGRESS NOTES
BEHAVIORAL HEALTH FOLLOW-UP NOTE     7/28/2022     Patient was seen and examined in person, Chart reviewed   Patient's case discussed with staff/team    Chief Complaint: \"    Interim History:   I saw patient sitting alone upon the unit. She still appears flat and blunted. When asked why she is feeling depressed and why she attempted suicide she states that she was feeling upset about all the things in the world. I asked her specifically what things were bothering her and she states \"things with animals and kids and negative stuff\" she states that she does not want to take the Zoloft but she is agreeable to take the Depakote which was started by her neurologist will also help with her mood.   She is currently denying SI she is guarded with flat blunted limited insight and judgment denies auditory or visual hallucinations    Appetite:   [x] Normal/Unchanged  [] Increased  [] Decreased      Sleep:       [x] Normal/Unchanged  [] Fair       [] Poor              Energy:    [x] Normal/Unchanged  [] Increased  [] Decreased        SI [] Present  [x] Absent    HI  []Present  [x] Absent     Aggression:  [] yes  [x] no    Patient is [x] able  [] unable to CONTRACT FOR SAFETY     PAST MEDICAL/PSYCHIATRIC HISTORY:   Past Medical History:   Diagnosis Date    Chronic back pain     Delayed growth and development     Heel spur     Right    Multiple sclerosis ([facility] [address] 07/26/2022    STD (female)     unknown as per pt; at Dr Malik Martinez       FAMILY/SOCIAL HISTORY:  Family History   Problem Relation Age of Onset    Diabetes Mother     Hypertension Mother     High Cholesterol Mother     Diabetes Maternal Grandmother     Diabetes Maternal Grandfather     Cancer Maternal Grandfather         lymphoma     Hypertension Brother     High Cholesterol Brother     Cancer Maternal Aunt         breast cancer diagnosed 44yo     Social History     Socioeconomic History    Marital status: Single     Spouse name: Not on file    Number of children: Not on file    Years of education: Not on file    Highest education level: Not on file   Occupational History    Occupation: unemployed   Tobacco Use    Smoking status: Never    Smokeless tobacco: Never   Vaping Use    Vaping Use: Never used   Substance and Sexual Activity    Alcohol use: No    Drug use: No    Sexual activity: Not Currently   Other Topics Concern    Not on file   Social History Narrative    Not on file     Social Determinants of Health     Financial Resource Strain: Low Risk     Difficulty of Paying Living Expenses: Not hard at all   Food Insecurity: No Food Insecurity    Worried About Running Out of Food in the Last Year: Never true    Ran Out of Food in the Last Year: Never true   Transportation Needs: Not on file   Physical Activity: Not on file   Stress: Not on file   Social Connections: Not on file   Intimate Partner Violence: Not on file   Housing Stability: Not on file           ROS:  [x] All negative/unchanged except if checked.  Explain positive(checked items) below:  [] Constitutional  [] Eyes  [] Ear/Nose/Mouth/Throat  [] Respiratory  [] CV  [] GI  []   [] Musculoskeletal  [] Skin/Breast  [] Neurological  [] Endocrine  [] Heme/Lymph  [] Allergic/Immunologic    Explanation:     MEDICATIONS:    Current Facility-Administered Medications:     vitamin D (CHOLECALCIFEROL) tablet 2,000 Units, 2,000 Units, Oral, Daily, EUSEBIO Elias CNP, 5,925 Units at 07/28/22 1148    aspirin EC tablet 81 mg, 81 mg, Oral, Daily, EUSEBIO Mendoza CNP, 81 mg at 07/28/22 1149    dimethyl fumarate (TECFIDERA) delayed release capsule 240 mg (Patient Supplied), 1 capsule, Oral, BID, EUSEBIO Elias CNP, 635 mg at 07/28/22 1148    acetaminophen (TYLENOL) tablet 650 mg, 650 mg, Oral, Q6H PRN, Silviano Ibrahim MD, 650 mg at 07/28/22 1412    magnesium hydroxide (MILK OF MAGNESIA) 400 MG/5ML suspension 30 mL, 30 mL, Oral, Daily PRN, Silviano Ibrahim MD    nicotine (NICODERM CQ) 21 MG/24HR 1 patch, 1 patch, TransDERmal, Daily, Albino Moore MD    aluminum & magnesium hydroxide-simethicone (MAALOX) 200-200-20 MG/5ML suspension 30 mL, 30 mL, Oral, PRN, Albino Moore MD    hydrOXYzine pamoate (VISTARIL) capsule 50 mg, 50 mg, Oral, TID PRN, Albino Moore MD    haloperidol (HALDOL) tablet 5 mg, 5 mg, Oral, Q6H PRN **OR** haloperidol lactate (HALDOL) injection 5 mg, 5 mg, IntraMUSCular, Q6H PRN, Albino Moore MD    melatonin tablet 3 mg, 3 mg, Oral, Nightly, Albino Moore MD      Examination:  BP (!) 90/57   Pulse 75   Temp 98.5 °F (36.9 °C) (Temporal)   Resp 14   Ht 5' 7\" (1.702 m)   Wt 155 lb (70.3 kg)   LMP  (LMP Unknown)   SpO2 99%   BMI 24.28 kg/m²   Gait - steady  Medication side effects(SE): Denies    Mental Status Examination:    Level of consciousness:  within normal limits   Appearance:  fair grooming and fair hygiene  Behavior/Motor:  no abnormalities noted  Attitude toward examiner:  cooperative  Speech:  spontaneous, normal rate and normal volume   Mood: \" I am doing okay. \"  Affect: Mood incongruent flat and blunted   thought processes: Linear thought flight of ideas loose associations  Thought content: Devoid of any auditory visual hallucinations delusions or other perceptual normalities. Denies SI/HI intent or plan  Cognition:  oriented to person, place, and time   Concentration intact  Insight Limited  Judgement Limited      ASSESSMENT:   Patient symptoms are:  [] Well controlled  [] Improving  [] Worsening  [x] No change      Diagnosis:   Principal Problem:    Bipolar 2 disorder, major depressive episode ([facility] [address]  Active Problems:    Suicide attempt [facility])  Resolved Problems:    * No resolved hospital problems.  *      LABS:    Recent Labs     07/26/22  0125   WBC 7.5   HGB 12.5        Recent Labs     07/26/22 0125      K 4.1      CO2 23   BUN 9   CREATININE 0.7   GLUCOSE 92     Recent Labs     07/26/22 0125   BILITOT <0.2   ALKPHOS 45   AST 13   ALT 10 Lab Results   Component Value Date/Time    LABAMPH NOT DETECTED 07/26/2022 04:54 AM    BARBSCNU NOT DETECTED 07/26/2022 04:54 AM    LABBENZ NOT DETECTED 07/26/2022 04:54 AM    LABMETH NOT DETECTED 07/26/2022 04:54 AM    OPIATESCREENURINE NOT DETECTED 07/26/2022 04:54 AM    PHENCYCLIDINESCREENURINE NOT DETECTED 07/26/2022 04:54 AM    ETOH <10 07/26/2022 01:25 AM     Lab Results   Component Value Date/Time    TSH 0.803 04/07/2022 11:30 AM     No results found for: LITHIUM  Lab Results   Component Value Date    VALPROATE 18 (L) 07/27/2022         Treatment Plan:  Reviewed current Medications with the patient. Risks, benefits, side effects, drug-to-drug interactions and alternatives to treatment were discussed. Collateral information:   CD evaluation  Encourage patient to attend group and other milieu activities.   Discharge planning discussed with the patient and treatment team.    Discontinue Zoloft  Continue Depakote 250 mg twice daily    PSYCHOTHERAPY/COUNSELING:  [x] Therapeutic interview  [x] Supportive  [] CBT  [] Ongoing  [] Other    [x] Patient continues to need, on a daily basis, active treatment furnished directly by or requiring the supervision of inpatient psychiatric personnel      Anticipated Length of stay: 3 to 7 days based on stability            Electronically signed by EUSEBIO Wilson CNP on 4/64/0041 at 3:50 PM

## 2022-07-28 NOTE — PROGRESS NOTES
Spiritual Support Group Note    Number of Participants in Group:      13                  Time:    2    Goal: Relief from isolation and loneliness             Arely Sharing             Self-understanding and gain insight              Acceptance and belonging            Recognize they are not alone                Socialization             Empowerment       Encouragement    Topic:  [x] Spiritual Wellness and Self Care                  [x] Hope                     [] Connecting with Divine/Others        [] Thankfulness and Gratitude               []  Meaningfulness and Purpose               [] Forgiveness               [] Peace               [] Connect to [employer]      [] Other    Participation Level:   [x] Active Listener   [] Minimal   [] Monopolizing   [] Interactive   [] No Participation   []  Other:     Attention:   [x] Alert   [] Distractible   [] Drowsy   [] Poor   [] Other:    Manner:   [x] Cooperative   [] Suspicious   [] Withdrawn   [] Guarded   [] Irritable   [] Inhospitable   [] Other:     Others Comments from Group:

## 2022-07-28 NOTE — PROGRESS NOTES
Attended evening music therapy group. Pleasant and engaged while sharing favorite songs and impact on relaxation. Was 1 of 11 in attendance.

## 2022-07-29 PROCEDURE — 1240000000 HC EMOTIONAL WELLNESS R&B

## 2022-07-29 PROCEDURE — 6370000000 HC RX 637 (ALT 250 FOR IP): Performed by: NURSE PRACTITIONER

## 2022-07-29 PROCEDURE — 99232 SBSQ HOSP IP/OBS MODERATE 35: CPT | Performed by: NURSE PRACTITIONER

## 2022-07-29 RX ORDER — DIVALPROEX SODIUM 250 MG/1
250 TABLET, DELAYED RELEASE ORAL EVERY 12 HOURS SCHEDULED
Status: DISCONTINUED | OUTPATIENT
Start: 2022-07-29 | End: 2022-08-01 | Stop reason: HOSPADM

## 2022-07-29 RX ADMIN — DIVALPROEX SODIUM 250 MG: 250 TABLET, DELAYED RELEASE ORAL at 09:40

## 2022-07-29 RX ADMIN — Medication 2000 UNITS: at 08:45

## 2022-07-29 RX ADMIN — DIMETHYL FUMARATE 240 MG: 240 CAPSULE ORAL at 08:45

## 2022-07-29 RX ADMIN — DIMETHYL FUMARATE 240 MG: 240 CAPSULE ORAL at 21:07

## 2022-07-29 RX ADMIN — ASPIRIN 81 MG: 81 TABLET, COATED ORAL at 08:45

## 2022-07-29 RX ADMIN — DIVALPROEX SODIUM 250 MG: 250 TABLET, DELAYED RELEASE ORAL at 21:06

## 2022-07-29 ASSESSMENT — PAIN SCALES - GENERAL
PAINLEVEL_OUTOF10: 0

## 2022-07-29 NOTE — PROGRESS NOTES
BEHAVIORAL HEALTH FOLLOW-UP NOTE     7/29/2022     Patient was seen and examined in person, Chart reviewed   Patient's case discussed with staff/team    Chief Complaint: \" I am starting to feel a little better. \"    Interim History:   Patient seen in treatment team today. She states that she is starting to feel a little better. She is discharge focused and minimizing the circumstance or hospitalization need for treatment and she has been more visible milieu and more social with peers. She denies SI/HI intent or plan denies auditory or visual hallucinations minimizing her overdose.             Appetite:   [x] Normal/Unchanged  [] Increased  [] Decreased      Sleep:       [x] Normal/Unchanged  [] Fair       [] Poor              Energy:    [x] Normal/Unchanged  [] Increased  [] Decreased        SI [] Present  [x] Absent    HI  []Present  [x] Absent     Aggression:  [] yes  [x] no    Patient is [x] able  [] unable to CONTRACT FOR SAFETY     PAST MEDICAL/PSYCHIATRIC HISTORY:   Past Medical History:   Diagnosis Date    Chronic back pain     Delayed growth and development     Heel spur     Right    Multiple sclerosis ([facility] [address] 07/26/2022    STD (female)     unknown as per pt; at Dr Jamal Espino       FAMILY/SOCIAL HISTORY:  Family History   Problem Relation Age of Onset    Diabetes Mother     Hypertension Mother     High Cholesterol Mother     Diabetes Maternal Grandmother     Diabetes Maternal Grandfather     Cancer Maternal Grandfather         lymphoma     Hypertension Brother     High Cholesterol Brother     Cancer Maternal Aunt         breast cancer diagnosed 42yo     Social History     Socioeconomic History    Marital status: Single     Spouse name: Not on file    Number of children: Not on file    Years of education: Not on file    Highest education level: Not on file   Occupational History    Occupation: unemployed   Tobacco Use    Smoking status: Never    Smokeless tobacco: Never   Vaping Use    Vaping Use: Never used Substance and Sexual Activity    Alcohol use: No    Drug use: No    Sexual activity: Not Currently   Other Topics Concern    Not on file   Social History Narrative    Not on file     Social Determinants of Health     Financial Resource Strain: Low Risk     Difficulty of Paying Living Expenses: Not hard at all   Food Insecurity: No Food Insecurity    Worried About Running Out of Food in the Last Year: Never true    Ran Out of Food in the Last Year: Never true   Transportation Needs: Not on file   Physical Activity: Not on file   Stress: Not on file   Social Connections: Not on file   Intimate Partner Violence: Not on file   Housing Stability: Not on file           ROS:  [x] All negative/unchanged except if checked.  Explain positive(checked items) below:  [] Constitutional  [] Eyes  [] Ear/Nose/Mouth/Throat  [] Respiratory  [] CV  [] GI  []   [] Musculoskeletal  [] Skin/Breast  [] Neurological  [] Endocrine  [] Heme/Lymph  [] Allergic/Immunologic    Explanation:     MEDICATIONS:    Current Facility-Administered Medications:     divalproex (DEPAKOTE) DR tablet 250 mg, 250 mg, Oral, 2 times per day, [facility], APRN - CNP, 001 mg at 07/29/22 0940    vitamin D (CHOLECALCIFEROL) tablet 2,000 Units, 2,000 Units, Oral, Daily, [facility] - CNP, 2,970 Units at 07/29/22 0845    aspirin EC tablet 81 mg, 81 mg, Oral, Daily, [facility], 81 mg at 07/29/22 0845    dimethyl fumarate (TECFIDERA) delayed release capsule 240 mg (Patient Supplied), 1 capsule, Oral, BID, Blayne Andrews Dellick, APRN - CNP, 523 mg at 07/29/22 0845    acetaminophen (TYLENOL) tablet 650 mg, 650 mg, Oral, Q6H PRN, Rickie Barker MD, 650 mg at 07/28/22 1412    magnesium hydroxide (MILK OF MAGNESIA) 400 MG/5ML suspension 30 mL, 30 mL, Oral, Daily PRN, Rickie Barker MD    nicotine (NICODERM CQ) 21 MG/24HR 1 patch, 1 patch, TransDERmal, Daily, Rickie Barker MD    aluminum & magnesium hydroxide-simethicone ([address]) [phone] 07/26/2022 04:54 AM    LABBENZ NOT DETECTED 07/26/2022 04:54 AM    LABMETH NOT DETECTED 07/26/2022 04:54 AM    OPIATESCREENURINE NOT DETECTED 07/26/2022 04:54 AM    PHENCYCLIDINESCREENURINE NOT DETECTED 07/26/2022 04:54 AM    ETOH <10 07/26/2022 01:25 AM     Lab Results   Component Value Date/Time    TSH 0.803 04/07/2022 11:30 AM     No results found for: LITHIUM  Lab Results   Component Value Date    VALPROATE 18 (L) 07/27/2022         Treatment Plan:  Reviewed current Medications with the patient. Risks, benefits, side effects, drug-to-drug interactions and alternatives to treatment were discussed. Collateral information:   CD evaluation  Encourage patient to attend group and other milieu activities.   Discharge planning discussed with the patient and treatment team.    Continue Depakote 250 mg twice daily    PSYCHOTHERAPY/COUNSELING:  [x] Therapeutic interview  [x] Supportive  [] CBT  [] Ongoing  [] Other    [x] Patient continues to need, on a daily basis, active treatment furnished directly by or requiring the supervision of inpatient psychiatric personnel      Anticipated Length of stay: 3 to 7 days based on stability            Electronically signed by EUSEBIO Crow CNP on 5/10/8373 at 11:12 AM

## 2022-07-29 NOTE — PROGRESS NOTES
Attended community meeting. Updated on staffing and daily expectations. Shared goal for the day as to \"to work on going home. \"

## 2022-07-29 NOTE — GROUP NOTE
Group Therapy Note    Date: 7/29/2022    Group Start Time: 2935  Group End Time: 1020  Group Topic: Psychoeducation    [facility]  [address] DAM[address] Tomlinson, \[facility] [place] [facility]\""        Group Therapy Note    Attendees: 12       Patient's Goal:  To identify current value that the pt has and new values that they would like to work on. Pt will be able to identify how values change and differ from others. Notes:  Pt participated in group and made connections. Status After Intervention:  Improved    Participation Level:  Active Listener and Interactive    Participation Quality: Appropriate and Attentive      Speech:  normal      Thought Process/Content: Logical  Linear      Affective Functioning: Flat      Mood: depressed      Level of consciousness:  Alert, Oriented x4, and Attentive      Response to Learning: Able to verbalize current knowledge/experience, Able to verbalize/acknowledge new learning, Able to retain information, and Capable of insight      Endings: None Reported    Modes of Intervention: Education, Support, Socialization, Exploration, Clarifying, Problem-solving, and Activity      Discipline Responsible: /Counselor      Signature:  DAMION Tee, [place]

## 2022-07-29 NOTE — PROGRESS NOTES
Pt attended afternoon smoking cessation group. Pt was participant 1 of 8. Pt appeared to be an active listener. Pt is able to share appropriately when prompted and ask facilitator relevant questions.

## 2022-07-29 NOTE — PLAN OF CARE
Problem: Involuntary Admit  Goal: Will cooperate with staff recommendations and doctor's orders and will demonstrate appropriate behavior  Description: INTERVENTIONS:  1. Treat underlying conditions and offer medication as ordered  2. Educate regarding involuntary admission procedures and rules  3. Contain excessive/inappropriate behavior per unit and hospital policies  3/86/4586 3035 by Renella Boeck, RN  Outcome: Progressing  7/29/2022 0525 by Ike Whitfield RN  Outcome: Progressing  Pt. Is a voluntary admit, signed consent  yesterday. Problem: Self Harm/Suicidality  Goal: Will have no self-injury during hospital stay  Description: INTERVENTIONS:  1. Q 30 MINUTES: Routine safety checks  2. Q SHIFT & PRN: Assess risk to determine if routine checks are adequate to maintain patient safety  7/29/2022 1004 by Renella Boeck, RN  Outcome: Progressing  7/29/2022 0525 by Ike Whitfield RN  Outcome: Progressing      Pt. denies self harm. Pt. denies suicidal ideations. Pt. Denies hallucinations. No voiced delusions. Problem: Depression  Goal: Will be euthymic at discharge  Description: INTERVENTIONS:  1. Administer medication as ordered  2. Provide emotional support via 1:1 interaction with staff  3. Encourage involvement in milieu/groups/activities  4. Monitor for social isolation  7/29/2022 0525 by Ike Whitfield RN  Outcome: Progressing     Problem: Pain  Goal: Verbalizes/displays adequate comfort level or baseline comfort level  7/29/2022 0525 by Ike Whitfield RN  Outcome: Progressing  No c/o pain.

## 2022-07-29 NOTE — GROUP NOTE
Group Therapy Note    Date: 7/29/2022    Group Start Time: 1100  Group End Time: 8761  Group Topic: Cognitive Skills    [facility]    AMANDA Mohamud        Group Therapy Note    Attendees: 10       Patient's Goal:  Pt will be able to discuss boundaries that they have and learn ways to set new boundaries. Notes:  Pt was an active participant in group discussion. Status After Intervention:  Improved    Participation Level: Active Listener and Interactive    Participation Quality: Appropriate, Attentive, Sharing, and Supportive      Speech:  normal      Thought Process/Content: Logical  Linear      Affective Functioning: Congruent      Mood: anxious      Level of consciousness:  Alert, Oriented x4, and Attentive      Response to Learning: Able to verbalize current knowledge/experience, Able to verbalize/acknowledge new learning, Able to retain information, Capable of insight, and Progressing to goal      Endings: None Reported    Modes of Intervention: Education, Support, Socialization, Exploration, Clarifying, and Problem-solving      Discipline Responsible: /Counselor      Signature:   Mariluz Mohamud

## 2022-07-30 PROCEDURE — 99232 SBSQ HOSP IP/OBS MODERATE 35: CPT | Performed by: NURSE PRACTITIONER

## 2022-07-30 PROCEDURE — 6370000000 HC RX 637 (ALT 250 FOR IP): Performed by: NURSE PRACTITIONER

## 2022-07-30 PROCEDURE — 1240000000 HC EMOTIONAL WELLNESS R&B

## 2022-07-30 RX ADMIN — DIMETHYL FUMARATE 240 MG: 240 CAPSULE ORAL at 08:50

## 2022-07-30 RX ADMIN — DIVALPROEX SODIUM 250 MG: 250 TABLET, DELAYED RELEASE ORAL at 21:15

## 2022-07-30 RX ADMIN — Medication 2000 UNITS: at 08:49

## 2022-07-30 RX ADMIN — ASPIRIN 81 MG: 81 TABLET, COATED ORAL at 08:49

## 2022-07-30 RX ADMIN — DIMETHYL FUMARATE 240 MG: 240 CAPSULE ORAL at 21:15

## 2022-07-30 RX ADMIN — DIVALPROEX SODIUM 250 MG: 250 TABLET, DELAYED RELEASE ORAL at 08:49

## 2022-07-30 ASSESSMENT — PAIN SCALES - GENERAL
PAINLEVEL_OUTOF10: 0
PAINLEVEL_OUTOF10: 0

## 2022-07-30 NOTE — GROUP NOTE
Group Therapy Note    Date: 7/30/2022    Group Start Time: 8870  Group End Time: 6615  Group Topic: Cognitive Skills    [facility]    DAMION Hill LSW        Group Therapy Note    Attendees: 16     Patient's Goal:  Pt will be able to verbalize understanding of self-care, and why it is important. Notes:  Pt made connections and participated in group.       Status After Intervention:  Improved    Participation Level: Minimal    Participation Quality: Attentive      Speech:  normal      Thought Process/Content: Logical  Linear      Affective Functioning: Congruent      Mood: depressed      Level of consciousness:  Alert, Oriented x4, and Attentive      Response to Learning: Able to verbalize current knowledge/experience      Endings: None Reported    Modes of Intervention: Education      Discipline Responsible: /Counselor      Signature:  DAMION Hill LSW

## 2022-07-30 NOTE — PROGRESS NOTES
Patient was out on unit,social with peers. Affect pleasant,cooperative. Denies suicidal,homicidal or AV hallucinations. Denies anxiety or depression. Compliant with medications. Attended group. Q 15 minute rounds continue.

## 2022-07-30 NOTE — PLAN OF CARE
Patient was out on unit,social with peers. A & O x 4. Affect is bright,social.  Denies SI,HI or AV hallucinations. Denies depression or anxiety. Verbalizes appetite is normal.  Sleeping fine. Patient talked about her MS medications and states that since was taking MS medications has been tired. Compliant with medications. Did not attend group. Q 15 minute rounds continue.

## 2022-07-30 NOTE — PROGRESS NOTES
I went to [facility] to see Valerie Gardner 45 NOTE     7/30/2022     Patient was seen and examined in person, Chart reviewed   Patient's case discussed with staff/team    Chief Complaint: \" I am tired because I have to pee all night r. \"    Interim History:   I saw patient this morning in her room she states that she feels tired because she has to go the bathroom all night. She believes it is from the MS medication causing this. Overall she states that she is feeling better she denies SI/HI intent or plan denies auditory visualization she has been taking the Depakote denies any side effects to that.   She is discharge focused that she is feeling much better she looks brighter overall she is been attending groups social with peers eating well sleeping well other than waking up to use the bathroom no neurovegetative signs of depression          Appetite:   [x] Normal/Unchanged  [] Increased  [] Decreased      Sleep:       [x] Normal/Unchanged  [] Fair       [] Poor              Energy:    [x] Normal/Unchanged  [] Increased  [] Decreased        SI [] Present  [x] Absent    HI  []Present  [x] Absent     Aggression:  [] yes  [x] no    Patient is [x] able  [] unable to CONTRACT FOR SAFETY     PAST MEDICAL/PSYCHIATRIC HISTORY:   Past Medical History:   Diagnosis Date    Chronic back pain     Delayed growth and development     Heel spur     Right    Multiple sclerosis ([facility] [address] 07/26/2022    STD (female)     unknown as per pt; at Dr Jamal Espino       FAMILY/SOCIAL HISTORY:  Family History   Problem Relation Age of Onset    Diabetes Mother     Hypertension Mother     High Cholesterol Mother     Diabetes Maternal Grandmother     Diabetes Maternal Grandfather     Cancer Maternal Grandfather         lymphoma     Hypertension Brother     High Cholesterol Brother     Cancer Maternal Aunt         breast cancer diagnosed 42yo     Social History     Socioeconomic History    Marital status: Single     Spouse name: Not on file Number of children: Not on file    Years of education: Not on file    Highest education level: Not on file   Occupational History    Occupation: unemployed   Tobacco Use    Smoking status: Never    Smokeless tobacco: Never   Vaping Use    Vaping Use: Never used   Substance and Sexual Activity    Alcohol use: No    Drug use: No    Sexual activity: Not Currently   Other Topics Concern    Not on file   Social History Narrative    Not on file     Social Determinants of Health     Financial Resource Strain: Low Risk     Difficulty of Paying Living Expenses: Not hard at all   Food Insecurity: No Food Insecurity    Worried About Running Out of Food in the Last Year: Never true    Ran Out of Food in the Last Year: Never true   Transportation Needs: Not on file   Physical Activity: Not on file   Stress: Not on file   Social Connections: Not on file   Intimate Partner Violence: Not on file   Housing Stability: Not on file           ROS:  [x] All negative/unchanged except if checked.  Explain positive(checked items) below:  [] Constitutional  [] Eyes  [] Ear/Nose/Mouth/Throat  [] Respiratory  [] CV  [] GI  []   [] Musculoskeletal  [] Skin/Breast  [] Neurological  [] Endocrine  [] Heme/Lymph  [] Allergic/Immunologic    Explanation:     MEDICATIONS:    Current Facility-Administered Medications:     divalproex (DEPAKOTE) DR tablet 250 mg, 250 mg, Oral, 2 times per day, EUSEBIO Muñoz - CNP, 804 mg at 07/30/22 4277    vitamin D (CHOLECALCIFEROL) tablet 2,000 Units, 2,000 Units, Oral, Daily, EUSEBIO Lennon - CNP, 5,637 Units at 07/30/22 0849    aspirin EC tablet 81 mg, 81 mg, Oral, Daily, EUSEBIO Lennon - CNP, 81 mg at 07/30/22 0849    dimethyl fumarate (TECFIDERA) delayed release capsule 240 mg (Patient Supplied), 1 capsule, Oral, BID, EUSEBIO Lennon - CNP, 687 mg at 07/30/22 0850    acetaminophen (TYLENOL) tablet 650 mg, 650 mg, Oral, Q6H PRN, Celestine King MD, 650 mg at 07/28/22 1412    magnesium hydroxide (MILK OF MAGNESIA) 400 MG/5ML suspension 30 mL, 30 mL, Oral, Daily PRN, Marylee Buckle, MD    nicotine (NICODERM CQ) 21 MG/24HR 1 patch, 1 patch, TransDERmal, Daily, Marylee Buckle, MD    aluminum & magnesium hydroxide-simethicone (MAALOX) 200-200-20 MG/5ML suspension 30 mL, 30 mL, Oral, PRN, Marylee Buckle, MD    hydrOXYzine pamoate (VISTARIL) capsule 50 mg, 50 mg, Oral, TID PRN, Marylee Buckle, MD    haloperidol (HALDOL) tablet 5 mg, 5 mg, Oral, Q6H PRN **OR** haloperidol lactate (HALDOL) injection 5 mg, 5 mg, IntraMUSCular, Q6H PRN, Marylee Buckle, MD    melatonin tablet 3 mg, 3 mg, Oral, Nightly, Marylee Buckle, MD, 3 mg at 07/28/22 2056      Examination:  /66   Pulse 91   Temp 99.1 °F (37.3 °C)   Resp 14   Ht 5' 7\" (1.702 m)   Wt 155 lb (70.3 kg)   LMP  (LMP Unknown)   SpO2 99%   BMI 24.28 kg/m²   Gait - steady  Medication side effects(SE): Denies    Mental Status Examination:    Level of consciousness:  within normal limits   Appearance:  fair grooming and fair hygiene  Behavior/Motor:  no abnormalities noted  Attitude toward examiner:  cooperative  Speech:  spontaneous, normal rate and normal volume   Mood: \" I am doing okay. \"  Affect: A little brighter less flat and blunted   thought processes: Linear thought flight of ideas loose associations  Thought content: Devoid of any auditory visual hallucinations delusions or other perceptual normalities. Denies SI/HI intent or plan  Cognition:  oriented to person, place, and time   Concentration intact  Insight Limited  Judgement Limited      ASSESSMENT:   Patient symptoms are:  [] Well controlled  [x] Improving  [] Worsening  [] No change      Diagnosis:   Principal Problem:    Bipolar 2 disorder, major depressive episode ([facility] [address]  Active Problems:    Suicide attempt [facility])  Resolved Problems:    * No resolved hospital problems. *      LABS:    No results for input(s): WBC, HGB, PLT in the last 72 hours.     No results for input(s): NA, K, CL, CO2, BUN, CREATININE, GLUCOSE in the last 72 hours. No results for input(s): BILITOT, ALKPHOS, AST, ALT in the last 72 hours. Lab Results   Component Value Date/Time    LABAMPH NOT DETECTED 07/26/2022 04:54 AM    BARBSCNU NOT DETECTED 07/26/2022 04:54 AM    LABBENZ NOT DETECTED 07/26/2022 04:54 AM    LABMETH NOT DETECTED 07/26/2022 04:54 AM    OPIATESCREENURINE NOT DETECTED 07/26/2022 04:54 AM    PHENCYCLIDINESCREENURINE NOT DETECTED 07/26/2022 04:54 AM    ETOH <10 07/26/2022 01:25 AM     Lab Results   Component Value Date/Time    TSH 0.803 04/07/2022 11:30 AM     No results found for: LITHIUM  Lab Results   Component Value Date    VALPROATE 18 (L) 07/27/2022         Treatment Plan:  Reviewed current Medications with the patient. Risks, benefits, side effects, drug-to-drug interactions and alternatives to treatment were discussed. Collateral information:   CD evaluation  Encourage patient to attend group and other milieu activities.   Discharge planning discussed with the patient and treatment team.    Continue Depakote 250 mg twice daily    PSYCHOTHERAPY/COUNSELING:  [x] Therapeutic interview  [x] Supportive  [] CBT  [] Ongoing  [] Other    [x] Patient continues to need, on a daily basis, active treatment furnished directly by or requiring the supervision of inpatient psychiatric personnel      Anticipated Length of stay: 3 to 7 days based on stability            Electronically signed by EUSEBIO Lozada CNP on 9/61/2668 at 10:02 AM

## 2022-07-30 NOTE — PROGRESS NOTES
Interacts with select peers. Smiles on interactions. Guarded with feelings using short replies. Denies SI/HI/AVH with no complaint of pain. Denies depression. A&Ox4 taking psych meds as ordered but refusing Melatonin. No signs of distress.   Monitor Q15

## 2022-07-31 LAB — VALPROIC ACID LEVEL: 34 MCG/ML (ref 50–100)

## 2022-07-31 PROCEDURE — 1240000000 HC EMOTIONAL WELLNESS R&B

## 2022-07-31 PROCEDURE — 99232 SBSQ HOSP IP/OBS MODERATE 35: CPT | Performed by: NURSE PRACTITIONER

## 2022-07-31 PROCEDURE — 36415 COLL VENOUS BLD VENIPUNCTURE: CPT

## 2022-07-31 PROCEDURE — 80164 ASSAY DIPROPYLACETIC ACD TOT: CPT

## 2022-07-31 PROCEDURE — 6370000000 HC RX 637 (ALT 250 FOR IP): Performed by: NURSE PRACTITIONER

## 2022-07-31 RX ADMIN — DIMETHYL FUMARATE 240 MG: 240 CAPSULE ORAL at 09:31

## 2022-07-31 RX ADMIN — DIVALPROEX SODIUM 250 MG: 250 TABLET, DELAYED RELEASE ORAL at 09:29

## 2022-07-31 RX ADMIN — ASPIRIN 81 MG: 81 TABLET, COATED ORAL at 09:29

## 2022-07-31 RX ADMIN — DIVALPROEX SODIUM 250 MG: 250 TABLET, DELAYED RELEASE ORAL at 21:45

## 2022-07-31 RX ADMIN — Medication 2000 UNITS: at 09:29

## 2022-07-31 RX ADMIN — DIMETHYL FUMARATE 240 MG: 240 CAPSULE ORAL at 21:45

## 2022-07-31 ASSESSMENT — PAIN SCALES - GENERAL
PAINLEVEL_OUTOF10: 0
PAINLEVEL_OUTOF10: 0

## 2022-07-31 NOTE — PLAN OF CARE
Patient ws out on unit,social with peers. Affect pleasant,cooperative. Eye contact good. Denies suicidal,homicidal or AV hallucinations. Denies anxiety or depression. Verbalizes appetite is normal.  Verbalized sleep is a struggle do to opening doors and the lights. Compliant with medications. No groups today,but has attended when in session. Q 15 minute rounds continue.

## 2022-07-31 NOTE — PROGRESS NOTES
Out with peers coloring playing games denies any SI HI or jackson \"I OD because of all the negativity in the world\" lives at home with mom Hx of MS emotional support given

## 2022-07-31 NOTE — PROGRESS NOTES
Patient was out on unit,social with peers. Affect brightened. Denies suicidal,homicidal or AV hallucinations. Denies anxiety or depression. No behavorial issues observed or reported. Compliant with medications. Attended group. Q 15 minute safety rounds continue.

## 2022-07-31 NOTE — PROGRESS NOTES
I went to the [facility] to see Valerie Gardner 45 NOTE     7/31/2022     Patient was seen and examined in person, Chart reviewed   Patient's case discussed with staff/team    Chief Complaint: \" I am feeling a lot better\"    Interim History:   Patient seen upon her room bright pleasant affect vehemently denies SI/HI intent or plan denies any auditory or visual hallucination she is now visible in the milieu attending groups socializing with peers eating well sleeping well no neurovegetative signs of depression.   No overt or covert signs psychosis        Appetite:   [x] Normal/Unchanged  [] Increased  [] Decreased      Sleep:       [x] Normal/Unchanged  [] Fair       [] Poor              Energy:    [x] Normal/Unchanged  [] Increased  [] Decreased        SI [] Present  [x] Absent    HI  []Present  [x] Absent     Aggression:  [] yes  [x] no    Patient is [x] able  [] unable to CONTRACT FOR SAFETY     PAST MEDICAL/PSYCHIATRIC HISTORY:   Past Medical History:   Diagnosis Date    Chronic back pain     Delayed growth and development     Heel spur     Right    Multiple sclerosis ([facility] [address] 07/26/2022    STD (female)     unknown as per pt; at Dr Oneyda Anderson       FAMILY/SOCIAL HISTORY:  Family History   Problem Relation Age of Onset    Diabetes Mother     Hypertension Mother     High Cholesterol Mother     Diabetes Maternal Grandmother     Diabetes Maternal Grandfather     Cancer Maternal Grandfather         lymphoma     Hypertension Brother     High Cholesterol Brother     Cancer Maternal Aunt         breast cancer diagnosed 45yo     Social History     Socioeconomic History    Marital status: Single     Spouse name: Not on file    Number of children: Not on file    Years of education: Not on file    Highest education level: Not on file   Occupational History    Occupation: unemployed   Tobacco Use    Smoking status: Never    Smokeless tobacco: Never   Vaping Use    Vaping Use: Never used   Substance and Sexual Activity Alcohol use: No    Drug use: No    Sexual activity: Not Currently   Other Topics Concern    Not on file   Social History Narrative    Not on file     Social Determinants of Health     Financial Resource Strain: Low Risk     Difficulty of Paying Living Expenses: Not hard at all   Food Insecurity: No Food Insecurity    Worried About Running Out of Food in the Last Year: Never true    Ran Out of Food in the Last Year: Never true   Transportation Needs: Not on file   Physical Activity: Not on file   Stress: Not on file   Social Connections: Not on file   Intimate Partner Violence: Not on file   Housing Stability: Not on file           ROS:  [x] All negative/unchanged except if checked.  Explain positive(checked items) below:  [] Constitutional  [] Eyes  [] Ear/Nose/Mouth/Throat  [] Respiratory  [] CV  [] GI  []   [] Musculoskeletal  [] Skin/Breast  [] Neurological  [] Endocrine  [] Heme/Lymph  [] Allergic/Immunologic    Explanation:     MEDICATIONS:    Current Facility-Administered Medications:     divalproex (DEPAKOTE) DR tablet 250 mg, 250 mg, Oral, 2 times per day, EUSEBIO Bailey CNP, 775 mg at 07/30/22 2115    vitamin D (CHOLECALCIFEROL) tablet 2,000 Units, 2,000 Units, Oral, Daily, EUSEBIO Brambila CNP, 1,850 Units at 07/30/22 1606    aspirin EC tablet 81 mg, 81 mg, Oral, Daily, EUSEBIO Brambila CNP, 81 mg at 07/30/22 0849    dimethyl fumarate (TECFIDERA) delayed release capsule 240 mg (Patient Supplied), 1 capsule, Oral, BID, EUSEBIO Brambila CNP, 274 mg at 07/30/22 2115    acetaminophen (TYLENOL) tablet 650 mg, 650 mg, Oral, Q6H PRN, Belinda Matson MD, 650 mg at 07/28/22 1412    magnesium hydroxide (MILK OF MAGNESIA) 400 MG/5ML suspension 30 mL, 30 mL, Oral, Daily PRN, Belinda Matson MD    nicotine (NICODERM CQ) 21 MG/24HR 1 patch, 1 patch, TransDERmal, Daily, Belinda Matson MD    aluminum & magnesium hydroxide-simethicone (MAALOX) 988-807-19 MG/5ML suspension 30 mL, 30 mL, Oral, PRN, Baron Reynolds MD    hydrOXYzine pamoate (VISTARIL) capsule 50 mg, 50 mg, Oral, TID PRN, Baron Reynolds MD    haloperidol (HALDOL) tablet 5 mg, 5 mg, Oral, Q6H PRN **OR** haloperidol lactate (HALDOL) injection 5 mg, 5 mg, IntraMUSCular, Q6H PRN, Baron Reynolds MD    melatonin tablet 3 mg, 3 mg, Oral, Nightly, Baron Reynolds MD, 3 mg at 07/28/22 2056      Examination:  BP (!) 90/51   Pulse 63   Temp 97.3 °F (36.3 °C)   Resp 14   Ht 5' 7\" (1.702 m)   Wt 155 lb (70.3 kg)   LMP  (LMP Unknown)   SpO2 99%   BMI 24.28 kg/m²   Gait - steady  Medication side effects(SE): Denies    Mental Status Examination:    Level of consciousness:  within normal limits   Appearance:  fair grooming and fair hygiene  Behavior/Motor:  no abnormalities noted  Attitude toward examiner:  cooperative  Speech:  spontaneous, normal rate and normal volume   Mood: \" I am doing okay. \"  Affect: Bright pleasant  thought processes: Linear thought flight of ideas loose associations  Thought content: Devoid of any auditory visual hallucinations delusions or other perceptual normalities. Denies SI/HI intent or plan  Cognition:  oriented to person, place, and time   Concentration intact  Insight Limited  Judgement Limited      ASSESSMENT:   Patient symptoms are:  [] Well controlled  [x] Improving  [] Worsening  [] No change      Diagnosis:   Principal Problem:    [facility] 2 [facility]  Active Problems:    Suicide attempt [facility])  Resolved Problems:    * No resolved hospital problems. *      LABS:    No results for input(s): WBC, HGB, PLT in the last 72 hours. No results for input(s): NA, K, CL, CO2, BUN, CREATININE, GLUCOSE in the last 72 hours. No results for input(s): BILITOT, ALKPHOS, AST, ALT in the last 72 hours.     Lab Results   Component Value Date/Time    LABAMPH NOT DETECTED 07/26/2022 04:54 AM    BARBSCNU NOT DETECTED 07/26/2022 04:54 AM    LABBENZ NOT DETECTED 07/26/2022 04:54 AM LABMETH NOT DETECTED 07/26/2022 04:54 AM    OPIATESCREENURINE NOT DETECTED 07/26/2022 04:54 AM    PHENCYCLIDINESCREENURINE NOT DETECTED 07/26/2022 04:54 AM    ETOH <10 07/26/2022 01:25 AM     Lab Results   Component Value Date/Time    TSH 0.803 04/07/2022 11:30 AM     No results found for: LITHIUM  Lab Results   Component Value Date    VALPROATE 34 (L) 07/31/2022         Treatment Plan:  Reviewed current Medications with the patient. Risks, benefits, side effects, drug-to-drug interactions and alternatives to treatment were discussed. Collateral information:   CD evaluation  Encourage patient to attend group and other milieu activities.   Discharge planning discussed with the patient and treatment team.    Continue Depakote 250 mg twice daily    PSYCHOTHERAPY/COUNSELING:  [x] Therapeutic interview  [x] Supportive  [] CBT  [] Ongoing  [] Other    [x] Patient continues to need, on a daily basis, active treatment furnished directly by or requiring the supervision of inpatient psychiatric personnel      Anticipated Length of stay: 3 to 7 days based on stability            Electronically signed by EUSEBIO Whitaker CNP on 3/87/9512 at 8:14 AM

## 2022-07-31 NOTE — GROUP NOTE
Group Therapy Note    Date: 7/31/2022    Group Start Time: 1340  Group End Time: 6973  Group Topic: Cognitive Skills    [facility]    DAMION Hill LSW        Group Therapy Note    Attendees: 18       Patient's Goal:  Pt will be able to verbalize understanding of the proper way to apologize, and it's importance. Notes:  Pt made connections and participated in group. Status After Intervention:  Improved    Participation Level:  Active Listener and Interactive    Participation Quality: Appropriate, Attentive, Sharing, and Supportive      Speech:  normal      Thought Process/Content: Logical  Linear      Affective Functioning: Congruent      Mood: depressed      Level of consciousness:  Alert, Oriented x4, and Attentive      Response to Learning: Able to verbalize current knowledge/experience      Endings: None Reported    Modes of Intervention: Education, Support, Socialization, and Exploration      Discipline Responsible: /Counselor      Signature:  DAMION Hill LSW

## 2022-08-01 VITALS
HEIGHT: 67 IN | TEMPERATURE: 97.9 F | WEIGHT: 155 LBS | SYSTOLIC BLOOD PRESSURE: 95 MMHG | RESPIRATION RATE: 16 BRPM | BODY MASS INDEX: 24.33 KG/M2 | OXYGEN SATURATION: 99 % | DIASTOLIC BLOOD PRESSURE: 56 MMHG | HEART RATE: 94 BPM

## 2022-08-01 PROCEDURE — 6370000000 HC RX 637 (ALT 250 FOR IP): Performed by: NURSE PRACTITIONER

## 2022-08-01 PROCEDURE — 99239 HOSP IP/OBS DSCHRG MGMT >30: CPT | Performed by: NURSE PRACTITIONER

## 2022-08-01 RX ORDER — NICOTINE 21 MG/24HR
1 PATCH, TRANSDERMAL 24 HOURS TRANSDERMAL DAILY
Qty: 30 PATCH | Refills: 0
Start: 2022-08-01 | End: 2022-09-02

## 2022-08-01 RX ORDER — LANOLIN ALCOHOL/MO/W.PET/CERES
3 CREAM (GRAM) TOPICAL NIGHTLY
Qty: 30 TABLET | Refills: 0 | Status: SHIPPED | OUTPATIENT
Start: 2022-08-01 | End: 2022-09-02

## 2022-08-01 RX ORDER — DIVALPROEX SODIUM 250 MG/1
TABLET, DELAYED RELEASE ORAL
Qty: 60 TABLET | Refills: 0 | Status: SHIPPED | OUTPATIENT
Start: 2022-08-01 | End: 2022-09-02 | Stop reason: ALTCHOICE

## 2022-08-01 RX ADMIN — Medication 2000 UNITS: at 09:09

## 2022-08-01 RX ADMIN — DIVALPROEX SODIUM 250 MG: 250 TABLET, DELAYED RELEASE ORAL at 09:09

## 2022-08-01 RX ADMIN — ASPIRIN 81 MG: 81 TABLET, COATED ORAL at 09:09

## 2022-08-01 RX ADMIN — DIMETHYL FUMARATE 240 MG: 240 CAPSULE ORAL at 09:09

## 2022-08-01 ASSESSMENT — PAIN SCALES - GENERAL: PAINLEVEL_OUTOF10: 0

## 2022-08-01 NOTE — PROGRESS NOTES
[facility]  Discharge Note    Pt discharged with followings belongings:   Dental Appliances: None  Vision - Corrective Lenses: Eyeglasses  Hearing Aid: None  Jewelry: None  Body Piercings Removed: N/A  Clothing: Footwear, Jacket/Coat, Pants, Shirt  Other Valuables: Wallet ([place] id, visa 1.00)   Valuables sent home with pt., ALONG WITH HOME MEDICATIONS, SAFETY PLAN AND COPY OF AVS.  All personal items were returned to patient. Patient education on aftercare instructions: completed, copy of AVS given to pt. .  Information faxed to  [facility]  . Patient verbalize understanding of AVS: yes with stated potential to comply to same. Status EXAM upon discharge:  Mental Status and Behavioral Exam    Level of Assistance: Independent/Self  Facial Expression: Avoids Gaze  Affect: Blunted  Level of Consciousness: Alert  Frequency of Checks: 4 times per hour, close  Mood: pleasant  Motor Activity:Normal: Yes  Eye Contact: Fair  Observed Behavior: Cooperative, Guarded  Sexual Misconduct History: Current - no  Preception: [place] to person, [place] to time, [place] to place, [place] to situation  Attention: improved  Thought Processes: Other (comment) (impoverished)  Thought Content: Poverty of content  Depression Symptoms: Impaired concentration  Anxiety Symptoms: Generalized  [place] Symptoms: No problems reported or observed.   Hallucinations: None  Delusions: No  Memory:Normal: Yes  Memory: Other (comment) (WNL)  Insight and Judgment: improved      Tobacco Screening:  Practical Counseling, on admission, willa X, if applicable and completed (first 3 are required if patient doesn't refuse):            ( ) Recognizing danger situations (included triggers and roadblocks)                    ( ) Coping skills (new ways to manage stress,relaxation techniques, changing routine, distraction)                                                           ( ) Basic information about quitting (benefits of quitting,

## 2022-08-01 NOTE — CARE COORDINATION
Sw met with pt to discuss discharge. Pt found socializing with peers, appears bright and pleasant with this sw. Pt reports that she is looking forward to seeing her mom and being able to spend more time with her. Pt reports that she feels much better than when she was first admitted, reports that she is looking forward to following up with her outpatient counselor. Pt denies SI/HI/AVH, reports that her stressor/trigger was watching the news and that she is going to avoid doing this from now on. Pt plans to return home with mom, mom to transport.

## 2022-08-01 NOTE — PLAN OF CARE
Problem: Involuntary Admit  Goal: Will cooperate with staff recommendations and doctor's orders and will demonstrate appropriate behavior  Description: INTERVENTIONS:  1. Treat underlying conditions and offer medication as ordered  2. Educate regarding involuntary admission procedures and rules  3. Contain excessive/inappropriate behavior per unit and hospital policies  Outcome: Progressing  Pt. Is medication and selective group compliant. Problem: Self Harm/Suicidality  Goal: Will have no self-injury during hospital stay  Description: INTERVENTIONS:  1. Q 30 MINUTES: Routine safety checks  2. Q SHIFT & PRN: Assess risk to determine if routine checks are adequate to maintain patient safety  8/1/2022 0843 by Devin Thornton, RN  Outcome: Progressing  8/1/2022 0843 by Devin Thornton, RN  Outcome: Progressing  Pt. Denies suicidal ideations. No self harm.

## 2022-08-01 NOTE — GROUP NOTE
Group Therapy Note    Date: 8/1/2022    Group Start Time: 1100  Group End Time: 3941  Group Topic: Psychotherapy    [facility]    [facility]        Group Therapy Note    Attendees: 6       Patient's Goal:  To increase socialization and improve interpersonal relationships. Notes:  Pt was an active participant in group discussion. Status After Intervention:  Improved    Participation Level: Active Listener and Interactive    Participation Quality: Appropriate, Attentive, Sharing, and Supportive      Speech:  normal      Thought Process/Content: Logical  Linear      Affective Functioning: Congruent      Mood: anxious and euthymic      Level of consciousness:  Alert, Oriented x4, and Attentive      Response to Learning: Able to verbalize current knowledge/experience, Able to verbalize/acknowledge new learning, Able to retain information, Capable of insight, and Progressing to goal      Endings: None Reported    Modes of Intervention: Support, Socialization, and Exploration      Discipline Responsible: /Counselor      Signature:   [facility]

## 2022-08-01 NOTE — CARE COORDINATION
JAGDISH contacted pt mom Andres Delgado  (CLAUDINE signed) to discuss possible discharge today. JAGDISH spoke with Andres Delgado who stated that she talked to her 2 days ago and she seems like she is fine. Mom feels that pt made a mistake and she was not trying to harm herself. Mom stated that the pt is unhappy with with what is going on in the world. Mom stated that she is going to be watching the pt when she is discharged home. Mom stated that the pt doesn't always make the best decisions but she is very bright and happy. Mom stated that she will be picking the pt up when she is discharged from the hospital.      JAGDISH called [facility] to schedule follow up appointments. JAGDISH left a message requesting a call back to schedule follow up appointments. UPDATE: JAGDISH scheduled intake appointment on 8/15 at 9:30 AM for an intake appointment.      [facility]   [address] [place] [address], [place] [address]   Phone: [phone]   Fax: [phone]     In order to ensure appropriate transition and discharge planning is in place, the following documents have been transmitted to [facility], as the new outpatient provider:    The d/c diagnosis was transmitted to the next care provider  The reason for hospitalization was transmitted to the next care provider  The d/c medications (dosage and indication) were transmitted to the next care provider   The continuing care plan was transmitted to the next care provider

## 2022-08-01 NOTE — CARE COORDINATION
Sw met with pt to discuss follow up agency. Pt found socializing with peers and eating breakfast in the common area, brightens with conversation. Pt reports that she would like referred to [facility] in [place].

## 2022-08-01 NOTE — GROUP NOTE
Group Therapy Note    Date: 8/1/2022    Group Start Time: 1005  Group End Time: 1050  Group Topic: Psychoeducation    [facility]    Galilea Pulido [place]                                                                        Group Therapy Note    Date: 8/1/2022    Type of Group: Psychoeducation    Wellness Binder Information  Module Name:  patient safety plan   Patient's Goal:  Patient will be able to id warning signs, internal coping skills and who he/she can go to for help. Notes:  Patient sharing and able to participate appropriately. Completed worksheet as necessary. Status After Intervention:  Improved    Participation Level:  Active Listener and Interactive    Participation Quality: Appropriate, Attentive, Sharing, and Supportive      Speech:  normal      Thought Process/Content: Logical      Affective Functioning: CongruentPatien      Mood: euthymic      Level of consciousness:  Alert, Oriented x4, and Attentive      Response to Learning: Able to verbalize/acknowledge new learning, Able to retain information, and Progressing to goal      Endings: None Reported    Modes of Intervention: Education, Support, Socialization, Exploration, and Problem-solving      Discipline Responsible: Psychoeducational Specialist      Signature:  Umu Wild

## 2022-08-01 NOTE — PROGRESS NOTES
LORENA, pt quiet resting in bed with eyes closed. Non labored respirations and no signs of distress. Continue to monitor Q15.

## 2022-08-01 NOTE — PROGRESS NOTES
Attended morning community meeting. Updated on staffing an daily expectations. Shared goal for the day as to stay positive.

## 2022-08-01 NOTE — DISCHARGE SUMMARY
DISCHARGE SUMMARY      Patient ID:  Naomi Rai  [id number]  40 y.o.  1980    Admit date: 7/25/2022    Discharge date and time: 8/1/2022    Admitting Physician: Kathleen Hu MD     Discharge Physician: Dr Andres Naranjo MD    Discharge Diagnoses:   Patient Active Problem List   Diagnosis    Patient overweight    Dysuria    Perennial allergic rhinitis    Multiple sclerosis ([facility]    Bipolar 2 disorder, major depressive episode ([facility]    Suicide attempt [facility])       Admission Condition: poor    Discharged Condition: stable    Admission Circumstance: Presented the ED after patient overdosed on half a bottle of Depakote valproic acid was 185      PAST MEDICAL/PSYCHIATRIC HISTORY:   Past Medical History:   Diagnosis Date    Chronic back pain     Delayed growth and development     Heel spur     Right    Multiple sclerosis ([facility] 07/26/2022    STD (female)     unknown as per pt; at Dr Malik Martinez       FAMILY/SOCIAL HISTORY:  Family History   Problem Relation Age of Onset    Diabetes Mother     Hypertension Mother     High Cholesterol Mother     Diabetes Maternal Grandmother     Diabetes Maternal Grandfather     Cancer Maternal Grandfather         lymphoma     Hypertension Brother     High Cholesterol Brother     Cancer Maternal Aunt         breast cancer diagnosed 42yo     Social History     Socioeconomic History    Marital status: Single     Spouse name: Not on file    Number of children: Not on file    Years of education: Not on file    Highest education level: Not on file   Occupational History    Occupation: unemployed   Tobacco Use    Smoking status: Never    Smokeless tobacco: Never   Vaping Use    Vaping Use: Never used   Substance and Sexual Activity    Alcohol use: No    Drug use: No    Sexual activity: Not Currently   Other Topics Concern    Not on file   Social History Narrative    Not on file     Social Determinants of Health     Financial Resource Strain: Low Risk     Difficulty of Paying Living Expenses: Not hard at all   Food Insecurity: No Food Insecurity    Worried About Running Out of Food in the Last Year: Never true    Ran Out of Food in the Last Year: Never true   Transportation Needs: Not on file   Physical Activity: Not on file   Stress: Not on file   Social Connections: Not on file   Intimate Partner Violence: Not on file   Housing Stability: Not on file       MEDICATIONS:    Current Facility-Administered Medications:     divalproex (DEPAKOTE) DR tablet 250 mg, 250 mg, Oral, 2 times per day, Arlester Pepper, APRN - CNP, 707 mg at 07/31/22 2145    vitamin D (CHOLECALCIFEROL) tablet 2,000 Units, 2,000 Units, Oral, Daily, Arlester Pepper, APRN - CNP, 0,217 Units at 07/31/22 3541    aspirin EC tablet 81 mg, 81 mg, Oral, Daily, Kyle Bolaños, APRN - CNP, 81 mg at 07/31/22 0929    dimethyl fumarate (TECFIDERA) delayed release capsule 240 mg (Patient Supplied), 1 capsule, Oral, BID, Arlester Pepper, APRN - CNP, 288 mg at 07/31/22 2145    acetaminophen (TYLENOL) tablet 650 mg, 650 mg, Oral, Q6H PRN, Bryanna Ray MD, 650 mg at 07/28/22 1412    magnesium hydroxide (MILK OF MAGNESIA) 400 MG/5ML suspension 30 mL, 30 mL, Oral, Daily PRN, Bryanna Ray MD    nicotine (NICODERM CQ) 21 MG/24HR 1 patch, 1 patch, TransDERmal, Daily, Bryanna Ray MD    aluminum & magnesium hydroxide-simethicone (MAALOX) 200-200-20 MG/5ML suspension 30 mL, 30 mL, Oral, PRN, Bryanna Ray MD    hydrOXYzine pamoate (VISTARIL) capsule 50 mg, 50 mg, Oral, TID PRN, Bryanna Ray MD    haloperidol (HALDOL) tablet 5 mg, 5 mg, Oral, Q6H PRN **OR** haloperidol lactate (HALDOL) injection 5 mg, 5 mg, IntraMUSCular, Q6H PRN, Bryanna Ray MD    melatonin tablet 3 mg, 3 mg, Oral, Nightly, Bryanna Ray MD, 3 mg at 07/28/22 2056    Examination:  BP (!) 95/56   Pulse 94   Temp 97.9 °F (36.6 °C)   Resp 16   Ht 5' 7\" (1.702 m)   Wt 155 lb (70.3 kg)   LMP  (LMP Unknown)   SpO2 99%   BMI 24.28 kg/m²   Gait - steady    HOSPITAL COURSE[de-identified]  Patient was admitted to the unit on 3/3/2021 was closely monitored for psychosis. She was evaluated and treated with Depakote 250 mg twice daily. Medical events were insignificant and patient continued to improve on the floor. She started coming out of her room she was attending groups to socializing with peers. She never made any suicidal statements or any suicidal gestures while in the unit. Social workers obtain confirmation patient's mother who was able to voice any concerns that she had. She reported no safety concerns no access to any guns. Treatment team felt the patient obtain the maximum benefit for her hospitalization She was set up with an outpatient mental health agency for outpatient follow-up services . At the time of discharge patient  did not show impulsive behavior. She was up on the unit she was attending groups and socializing with peers. She vehemently denied any suicidal homicidal ideations intent or plan. She was eating well and sleeping well there are no neurovegetative signs or symptoms of depression she denied any auditory visualizations. There are no overt or covert signs psychosis. She was appreciative of the help that she received here. This patient no longer meets criteria for inpatient hospitalization. No AVH or paranoid thoughts  No hopeless or worthless feeling  No active SI/HI  Appetite:  [x] Normal  [] Increased  [] Decreased    Sleep:       [x] Normal  [] Fair       [] Poor            Energy:    [x] Normal  [] Increased  [] Decreased     SI [] Present  [x] Absent  HI  []Present  [x] Absent   Aggression:  [] yes  [x] no  Patient is [x] able  [] unable to CONTRACT FOR SAFETY   Medication side effects(SE):  [x] None(Psych.  Meds.) [] Other      Mental Status Examination on discharge:    Level of consciousness:  within normal limits   Appearance:  well-appearing  Behavior/Motor:  no abnormalities noted  Attitude toward examiner:  attentive and good eye contact  Speech:  spontaneous, normal rate and normal volume   Mood: \"My mood is good. \"  Affect: Appropriate and pleasant  Thought processes: Linear without flights of ideas or loose associations  Thought content: Devoid of any auditory visualizations delusions during the perceptual normalities. Denies SI/HI intent or plan  Cognition:  oriented to person, place, and time   Concentration intact  Memory intact  Insight good   Judgement fair   Fund of Knowledge adequate      ASSESSMENT:  Patient symptoms are:  [x] Well controlled  [x] Improving  [] Worsening  [] No change    Reason for more than one antipsychotic:  [x] N/A  [] 3 Failed Monotherapy attempts (Drugs tried:)  [] Crossover to a new antipsychotic  [] Taper to Monotherapy from Polypharmacy  [] Augmentation of clozapine therapy due to treatment resistance to single therapy    Diagnosis:  Principal Problem:    Bipolar 2 disorder, major depressive episode ([facility]  Active Problems:    Suicide attempt ([facility]  Resolved Problems:    * No resolved hospital problems. *      LABS:    No results for input(s): WBC, HGB, PLT in the last 72 hours. No results for input(s): NA, K, CL, CO2, BUN, CREATININE, GLUCOSE in the last 72 hours. No results for input(s): BILITOT, ALKPHOS, AST, ALT in the last 72 hours. Lab Results   Component Value Date/Time    LABAMPH NOT DETECTED 07/26/2022 04:54 AM    BARBSCNU NOT DETECTED 07/26/2022 04:54 AM    LABBENZ NOT DETECTED 07/26/2022 04:54 AM    LABMETH NOT DETECTED 07/26/2022 04:54 AM    OPIATESCREENURINE NOT DETECTED 07/26/2022 04:54 AM    PHENCYCLIDINESCREENURINE NOT DETECTED 07/26/2022 04:54 AM    ETOH <10 07/26/2022 01:25 AM     Lab Results   Component Value Date/Time    TSH 0.803 04/07/2022 11:30 AM     No results found for: LITHIUM  Lab Results   Component Value Date    VALPROATE 34 (L) 07/31/2022       RISK ASSESSMENT AT DISCHARGE: Low risk for suicide and homicide.      Treatment Plan:  Reviewed current Medications with the patient. Education provided on the complaince with treatment. Risks, benefits, side effects, drug-to-drug interactions and alternatives to treatment were discussed. Encourage patient to attend outpatient follow up appointment and therapy. Patient was advised to call the outpatient provider, visit the nearest ED or call 911 if symptoms are not manageable. Patient's family member was contacted prior to the discharge. Medication List        START taking these medications      melatonin 3 MG Tabs tablet  Take 1 tablet by mouth nightly     nicotine 21 MG/24HR  Commonly known as: NICODERM CQ  Place 1 patch onto the skin in the morning.             CONTINUE taking these medications      aspirin 81 MG tablet     divalproex 250 MG DR tablet  Commonly known as: DEPAKOTE  TAKE 1 TABLET BY MOUTH TWICE DAILY     Tecfidera 240 MG delayed release capsule  Generic drug: dimethyl fumarate  TAKE 1 CAPSULE (240MG) BY  MOUTH TWICE DAILY     therapeutic multivitamin-minerals tablet     vitamin D 50 MCG (2000 UT) Caps capsule            STOP taking these medications      sertraline 25 MG tablet  Commonly known as: Zoloft            ASK your doctor about these medications      fluticasone 50 MCG/ACT nasal spray  Commonly known as: Flonase  2 sprays by Nasal route daily               Where to Get Your Medications        These medications were sent to [facility], [address] [address]      Phone: [phone]   divalproex 250 MG DR tablet  melatonin 3 MG Tabs tablet       These medications were sent to [address] [facility] [address], [address] [facility]  [address] [facility] [address]      Phone: [phone]   Tecfidera 240 MG delayed release capsule       Information about where to get these medications is not yet available    Ask your nurse or doctor about these medications  nicotine 21 MG/24HR       Patient is counseled to most been compliant with all medications all outpatient follow-up appointments    Patient is discharged home in stable condition    TIME SPEND - 35 MINUTES TO COMPLETE THE EVALUATION, DISCHARGE SUMMARY, MEDICATION RECONCILIATION AND FOLLOW UP CARE     Signed:  EUSEBIO Posada - CNP  0/7/4299  8:23 AM

## 2022-08-02 ENCOUNTER — HOSPITAL ENCOUNTER (OUTPATIENT)
Dept: SPEECH THERAPY | Age: 42
Setting detail: THERAPIES SERIES
Discharge: HOME | End: 2022-08-02
Payer: COMMERCIAL

## 2022-08-02 ENCOUNTER — TELEPHONE (OUTPATIENT)
Dept: NEUROLOGY | Age: 42
End: 2022-08-02

## 2022-08-02 PROCEDURE — 97130 THER IVNTJ EA ADDL 15 MIN: CPT

## 2022-08-02 PROCEDURE — 97129 THER IVNTJ 1ST 15 MIN: CPT

## 2022-08-02 NOTE — PROGRESS NOTES
Ursula Pinedo was treated through a synchronous (real-time) audio-video encounter. The patient (or guardian if applicable) is aware that this is a billable service, which includes applicable co-pays. This virtual visit was conducted with patient's (and/or legal guardian's) consent. The visit was conducted pursuant to the emergency declaration under the [address] [place] [address], [address] [place] [address] authority and the [place] [employer] General Act. Patient identification was verified, and a caregiver was present when appropriate. The patient was located in a state where the provider was licensed to provide care. This visit was completed virtually using doxy as we could not connect to [employer]. 23 minute individual session. The pt was attentive and engaged easily in conversational speech. She recalled 4 words using rehearsing and grouping strategies with 88% accuracy. Pt continues to make progress in therapy. Continue POC.     Saul Sosa M.A., [address] [facility]  Speech Pathologist  8/2/2022    [id number]  therapeutic interventions that focus on cognitive function , initial  15 min  [id number]  therapeutic interventions that focus on cognitive function, each additional 15 min

## 2022-08-02 NOTE — TELEPHONE ENCOUNTER
Patient's mom called in very upset because Jim Valladares was sent to the wrong pharmacy again. She stated that this is not the first time this has happened. Called the [employer] in to [facility] for the patient and they will reach out to her to send.

## 2022-08-09 ENCOUNTER — HOSPITAL ENCOUNTER (OUTPATIENT)
Dept: SPEECH THERAPY | Age: 42
Setting detail: THERAPIES SERIES
Discharge: HOME | End: 2022-08-09
Payer: COMMERCIAL

## 2022-08-09 PROCEDURE — 97130 THER IVNTJ EA ADDL 15 MIN: CPT

## 2022-08-09 PROCEDURE — 97129 THER IVNTJ 1ST 15 MIN: CPT

## 2022-08-09 NOTE — PROGRESS NOTES
Hannah Hamlin was treated through a synchronous (real-time) audio-video encounter. The patient (or guardian if applicable) is aware that this is a billable service, which includes applicable co-pays. This virtual visit was conducted with patient's (and/or legal guardian's) consent. The visit was conducted pursuant to the emergency declaration under the [facility], [address] [place] [address] authority and the [employer] General Act. Patient identification was verified, and a caregiver was present when appropriate. The patient was located in a state where the provider was licensed to provide care. This visit was completed virtually using doxy as we could not connect to [employer]. 30 minute individual session. The pt was attentive and engaged easily in conversational speech. The SLP started testing for the pt's cognitive re-evaluation via the RIPA-2. Pt continues to make progress in therapy. Continue POC.     Michael Ramsey M.A.[facility]  Speech Pathologist  8/9/2022    74117  therapeutic interventions that focus on cognitive function , initial  15 min  51269  therapeutic interventions that focus on cognitive function, each additional 15 min

## 2022-08-11 RX ORDER — DIMETHYL FUMARATE 240 MG/1
240 CAPSULE ORAL 2 TIMES DAILY
Qty: 60 CAPSULE | Refills: 5 | Status: SHIPPED
Start: 2022-08-11 | End: 2022-09-02 | Stop reason: ALTCHOICE

## 2022-08-16 ENCOUNTER — HOSPITAL ENCOUNTER (OUTPATIENT)
Dept: SPEECH THERAPY | Age: 42
Setting detail: THERAPIES SERIES
Discharge: HOME | End: 2022-08-16
Payer: COMMERCIAL

## 2022-08-16 PROCEDURE — 97129 THER IVNTJ 1ST 15 MIN: CPT

## 2022-08-16 PROCEDURE — 97130 THER IVNTJ EA ADDL 15 MIN: CPT

## 2022-08-17 NOTE — PROGRESS NOTES
[address] [place] [address]  Outpatient Speech Therapy  Phone: [phone] Fax: [phone]       SPEECH/LANGUAGE PATHOLOGY  SPEECH/LANGUAGE/COGNITIVE RE-EVALUATION         PATIENT NAME:  Nato Burns  (female)     MRN:  [id number]    :  1980  (40 y.o.)  STATUS:  Outpatient    TODAY'S DATE:  2022  REFERRING PROVIDER:    Dr. Norma Maria   SPECIFIC PROVIDER ORDER: SLP cognitive language evaluation  Date of order:  2021  REASON FOR REFERRAL: cognitive communication deficit R41.841; pt is also diagnosed with MS    REFERRING DIAGNOSIS: Cognitive communication deficit [R41.841]    The pt was seen for a cognitive evaluation following a referral from Dr. Norma Maria. The re-evaluation was completed virtually via doxy. me and Massimo. She was previously [facility] from 2020 until 10/5/2021.       SPEECH THERAPY  PLAN OF CARE   The speech therapy  POC is established based on physician order, speech pathology diagnosis and results of clinical assessment    Speech Pathology intervention is recommended 1 time/week for 6-12 weeks with emphasis on the following:      SPEECH PATHOLOGY DIAGNOSIS:    Moderate-marked cognitive deficit    Conditions Requiring Skilled Therapeutic Intervention for speech, language and/or cognition    Cognitive linguistic impairment  Decreased short term memory  Decreased problem solving skills   Decreased thought organization  Decreased auditory processing    Specific Speech Therapy Interventions to Include:   Compensatory strategy training  Home exercise program  Therapeutic tasks for Cognition    Specific instructions for next treatment:  immediate memory tasks with training in use of compensatory strategies    SHORT/LONG TERM GOALS  LTG: To improve memory, thought organization and problem-solving to complete functional tasks including recall of dr appointments, taking medications, etc 80% of the time. ST. To improve immediate memory to >80% accuracy using compensatory strategies such as rehearsing, grouping and chunking   To improve temporal orientation (remote memory) to >80% accuracy. To improve reasoning/problem-solving to >80% accuracy    To improve thought organization for daily tasks to >80% accuracy   To improve auditory processing and retention to >80% accuracy. To provide patient education and tasks to increase carryover to home once/week. Stimulus questions were obtained from the RIPA-2. Severity ratings for each subtest were determined as follows:         Subtest Raw Score Percentile Standard Score Severity          Immediate Memory   15 16 7 severe   Recent Memory   29 91 14 Mild-WFL   Temporal Orientation  (Recent Memory) 30 91 14 Mild-WFL   Temporal Orientation  (Remote Memory) 27 63 11 moderate   Spatial Orientation   22 25 8 Severe   Orientation to Environment Not tested- for hospital ADL's N/A N/A N/A   Recall of General Information Not tested N/A N/A N/A   Problem Solving & Abstract Reasoning 23 50 10 Marked   Organization   24 75 12 moderate   Auditory Processing & Retention 24 37 9 marked       VARIANT OBSERVATIONS     Present Absent   Error (e) x    Perseveration (p) x    Repeat Instructions/Stimulus (r) x    Denial/Refusal (d)  x   Delayed Response (dl) x    Confabulation (c)  x   Partially Correct (pc) x    Irrelevant (i)  x   Tangential (t) x    Self-corrected (sc) x      Comments: The patient has demonstrated mild improvements in most areas of memory and in problem solving/abstract reasoning skills. The pt has demonstrated improved ability to implement rehearsing, visualizing and grouping strategies during therapy sessions. It is felt that the pt will benefit from continued cognitive therapy.     Patient stated goals: Agreed with above     Rehabilitation Potential/Prognosis: good                  CLINICAL OBSERVATIONS NOTED DURING THE EVALUATION  Latent responses, Perseveration errors, Cueing was required                  EDUCATION:   The Speech Language Pathologist (SLP) completed education regarding results of evaluation and that intervention is warranted at this time. Learner: Patient  Education: Reviewed results and recommendations of this evaluation  Evaluation of Education:  Meng Jane understanding    This plan may be re-evaluated and revised as warranted. Treatment goals discussed with Patient   The Patient understand(s) the diagnosis, prognosis and plan of care     Evaluation Time includes thorough review of current medical information, gathering information on past medical history/social history and prior level of function, completion of standardized testing/informal observation of tasks, assessment of data and education on plan of care and goals. CPT code:    74040  eval speech sound lang comprehension      The admitting diagnosis and active problem list, as listed below have been reviewed prior to initiation of this evaluation. ACTIVE PROBLEM LIST:   Patient Active Problem List   Diagnosis    Patient overweight    Dysuria    Perennial allergic rhinitis    Multiple sclerosis ([facility]    Bipolar 2 disorder, major depressive episode ([facility]    Suicide attempt [facility])       Sheila Lin M.A., [address] [place] [address]  Speech Pathologist  8/17/2022     [address]     Phone: [phone]     If you have any questions or concerns, please don't hesitate to call. Thank you for your referral.    Physician/Provider Signature:________________________________Date:__________________  By signing above, the therapists plan is approved by the physician/provider.

## 2022-08-23 ENCOUNTER — HOSPITAL ENCOUNTER (OUTPATIENT)
Dept: SPEECH THERAPY | Age: 42
Setting detail: THERAPIES SERIES
Discharge: HOME | End: 2022-08-23
Payer: COMMERCIAL

## 2022-08-23 PROCEDURE — 97129 THER IVNTJ 1ST 15 MIN: CPT

## 2022-08-23 PROCEDURE — 97130 THER IVNTJ EA ADDL 15 MIN: CPT

## 2022-08-23 NOTE — PROGRESS NOTES
Hannah Hamlin was treated through a synchronous (real-time) audio-video encounter. The patient (or guardian if applicable) is aware that this is a billable service, which includes applicable co-pays. This virtual visit was conducted with patient's (and/or legal guardian's) consent. The visit was conducted pursuant to the emergency declaration under the [facility], [address] [place] [address] authority and the [employer] General Act. Patient identification was verified, and a caregiver was present when appropriate. The patient was located in a state where the provider was licensed to provide care. This visit was completed virtually using doxy as we could not connect to [employer]. 23 minute individual session as the pt had some difficulty with the audio. The pt was attentive and engaged easily in conversational speech. The pt recalled 4 words by sequence e.g. the first word, third word, etc with 67% accuracy given repetition. She used the rehearsing strategy with mild verbal cues. Continue POC.     Toyin Hewitt M.A. [facility]  Speech Pathologist  8/23/2022    [id number]  therapeutic interventions that focus on cognitive function , initial  15 min  [id number]  therapeutic interventions that focus on cognitive function, each additional 15 min

## 2022-08-30 ENCOUNTER — HOSPITAL ENCOUNTER (OUTPATIENT)
Dept: SPEECH THERAPY | Age: 42
Setting detail: THERAPIES SERIES
Discharge: HOME | End: 2022-08-30
Payer: COMMERCIAL

## 2022-08-30 NOTE — PROGRESS NOTES
Pt called to cx. Continue POC.     Nayeli Gavin M.A., [address] [facility]  Speech Pathologist  8/30/2022

## 2022-09-02 ENCOUNTER — OFFICE VISIT (OUTPATIENT)
Dept: FAMILY MEDICINE CLINIC | Age: 42
End: 2022-09-02
Payer: COMMERCIAL

## 2022-09-02 VITALS
HEIGHT: 67 IN | RESPIRATION RATE: 16 BRPM | DIASTOLIC BLOOD PRESSURE: 68 MMHG | SYSTOLIC BLOOD PRESSURE: 112 MMHG | BODY MASS INDEX: 25.43 KG/M2 | HEART RATE: 72 BPM | TEMPERATURE: 97.5 F | OXYGEN SATURATION: 100 % | WEIGHT: 162 LBS

## 2022-09-02 DIAGNOSIS — R25.1 TREMOR: ICD-10-CM

## 2022-09-02 DIAGNOSIS — Z11.4 ENCOUNTER FOR SCREENING FOR HIV: ICD-10-CM

## 2022-09-02 DIAGNOSIS — R25.1 TREMOR: Primary | ICD-10-CM

## 2022-09-02 LAB
ALBUMIN SERPL-MCNC: 4.9 G/DL (ref 3.5–5.2)
ALP BLD-CCNC: 49 U/L (ref 35–104)
ALT SERPL-CCNC: 8 U/L (ref 0–32)
ANION GAP SERPL CALCULATED.3IONS-SCNC: 10 MMOL/L (ref 7–16)
AST SERPL-CCNC: 15 U/L (ref 0–31)
BASOPHILS ABSOLUTE: 0.08 E9/L (ref 0–0.2)
BASOPHILS RELATIVE PERCENT: 1 % (ref 0–2)
BILIRUB SERPL-MCNC: 0.3 MG/DL (ref 0–1.2)
BUN BLDV-MCNC: 13 MG/DL (ref 6–20)
CALCIUM SERPL-MCNC: 9.2 MG/DL (ref 8.6–10.2)
CHLORIDE BLD-SCNC: 106 MMOL/L (ref 98–107)
CO2: 24 MMOL/L (ref 22–29)
CREAT SERPL-MCNC: 0.8 MG/DL (ref 0.5–1)
EOSINOPHILS ABSOLUTE: 0.07 E9/L (ref 0.05–0.5)
EOSINOPHILS RELATIVE PERCENT: 0.9 % (ref 0–6)
FOLATE: 15.6 NG/ML (ref 4.8–24.2)
GFR AFRICAN AMERICAN: >60
GFR NON-AFRICAN AMERICAN: >60 ML/MIN/1.73
GLUCOSE BLD-MCNC: 64 MG/DL (ref 74–99)
HCT VFR BLD CALC: 42.1 % (ref 34–48)
HEMOGLOBIN: 13.9 G/DL (ref 11.5–15.5)
IMMATURE GRANULOCYTES #: 0.02 E9/L
IMMATURE GRANULOCYTES %: 0.2 % (ref 0–5)
LYMPHOCYTES ABSOLUTE: 1.02 E9/L (ref 1.5–4)
LYMPHOCYTES RELATIVE PERCENT: 12.5 % (ref 20–42)
MCH RBC QN AUTO: 32.8 PG (ref 26–35)
MCHC RBC AUTO-ENTMCNC: 33 % (ref 32–34.5)
MCV RBC AUTO: 99.3 FL (ref 80–99.9)
MONOCYTES ABSOLUTE: 0.88 E9/L (ref 0.1–0.95)
MONOCYTES RELATIVE PERCENT: 10.8 % (ref 2–12)
NEUTROPHILS ABSOLUTE: 6.08 E9/L (ref 1.8–7.3)
NEUTROPHILS RELATIVE PERCENT: 74.6 % (ref 43–80)
PDW BLD-RTO: 12.9 FL (ref 11.5–15)
PLATELET # BLD: 307 E9/L (ref 130–450)
PMV BLD AUTO: 9.6 FL (ref 7–12)
POTASSIUM SERPL-SCNC: 4.4 MMOL/L (ref 3.5–5)
RBC # BLD: 4.24 E12/L (ref 3.5–5.5)
SODIUM BLD-SCNC: 140 MMOL/L (ref 132–146)
TOTAL PROTEIN: 7.9 G/DL (ref 6.4–8.3)
TSH SERPL DL<=0.05 MIU/L-ACNC: 1.12 UIU/ML (ref 0.27–4.2)
VITAMIN B-12: 596 PG/ML (ref 211–946)
WBC # BLD: 8.2 E9/L (ref 4.5–11.5)

## 2022-09-02 PROCEDURE — 36415 COLL VENOUS BLD VENIPUNCTURE: CPT | Performed by: FAMILY MEDICINE

## 2022-09-02 PROCEDURE — 99213 OFFICE O/P EST LOW 20 MIN: CPT | Performed by: FAMILY MEDICINE

## 2022-09-02 PROCEDURE — 1036F TOBACCO NON-USER: CPT | Performed by: FAMILY MEDICINE

## 2022-09-02 PROCEDURE — G8427 DOCREV CUR MEDS BY ELIG CLIN: HCPCS | Performed by: FAMILY MEDICINE

## 2022-09-02 PROCEDURE — G8419 CALC BMI OUT NRM PARAM NOF/U: HCPCS | Performed by: FAMILY MEDICINE

## 2022-09-02 RX ORDER — ARIPIPRAZOLE 5 MG/1
1 TABLET ORAL NIGHTLY
COMMUNITY
Start: 2022-08-25

## 2022-09-02 ASSESSMENT — ENCOUNTER SYMPTOMS
SHORTNESS OF BREATH: 0
COLOR CHANGE: 0
BACK PAIN: 0
WHEEZING: 0
VOMITING: 0
DIARRHEA: 0
ABDOMINAL PAIN: 0
COUGH: 0

## 2022-09-02 NOTE — PROGRESS NOTES
[facility] [facility]  Department of Family Medicine  Family Medicine Residency Program      Patient:  Graciela Phillips 40 y.o. female     Date of Service: 9/2/22      Chiefcomplaint:   Chief Complaint   Patient presents with    Tremors     Bilateral hands          History of Present Illness     This is a 50-year-old female in office with a chief concern of tremors. Patient was history [place] present since approximately 2021. She does not recall any specific precipitating event such as injuries, MVC's, trauma, assault or injuries. 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 She does note a family history of tremor in her mother. She notes the tremor only occurs when she is holding an object in her hand and she is attempting to use the object. Does not recur always. The tremor does not occur when she is reaching for an object and the tremor does not occur at rest.  Currently taking only Abilify for depression. He is previously was maintained on valproic acid but this was discontinued approximately 1 month prior. He does have a history of multiple sclerosis and is followed by neurology. She is maintained on Tecfidera for this. Allergies:    Patient has no known allergies. Medication List:    Current Outpatient Medications   Medication Sig Dispense Refill    ARIPiprazole (ABILIFY) 5 MG tablet Take 1 tablet by mouth nightly      Cholecalciferol (VITAMIN D) 50 MCG (2000 UT) CAPS capsule Take 1 capsule by mouth in the morning. fluticasone (FLONASE) 50 MCG/ACT nasal spray 2 sprays by Nasal route daily 1 each 2    Multiple Vitamins-Minerals (THERAPEUTIC MULTIVITAMIN-MINERALS) tablet Take 1 tablet by mouth daily      aspirin 81 MG tablet Take 81 mg by mouth daily        No current facility-administered medications for this visit. Review of Systems   Constitutional:  Negative for chills and fever. Respiratory:  Negative for cough, shortness of breath and wheezing. Cardiovascular:  Negative for chest pain. Gastrointestinal:  Negative for abdominal pain, diarrhea and vomiting. Musculoskeletal:  Negative for back pain and neck pain. Skin:  Negative for color change. Neurological:  Negative for dizziness, tremors, weakness and light-headedness. Physical Exam   Physical Exam  Constitutional:       Appearance: She is well-developed. HENT:      Head: Normocephalic. Right Ear: External ear normal.      Left Ear: External ear normal.   Eyes:      Conjunctiva/sclera: Conjunctivae normal.      Pupils: Pupils are equal, round, and reactive to light. Cardiovascular:      Rate and Rhythm: Normal rate and regular rhythm. Heart sounds: Normal heart sounds. No murmur heard. Pulmonary:      Effort: Pulmonary effort is normal. No respiratory distress. Breath sounds: Normal breath sounds. No wheezing or rales. Abdominal:      General: There is no distension. Palpations: Abdomen is soft. Tenderness: There is no abdominal tenderness. There is no guarding or rebound. Musculoskeletal:         General: Normal range of motion. Cervical back: Normal range of motion. Comments: No neurological deficits, no weakness noted in the bilateral upper extremities. 5 out of 5 muscle strength in the bilateral extremities. Intact sensation. Skin:     General: Skin is warm. Findings: No erythema. Neurological:      Mental Status: She is alert and oriented to person, place, and time. Psychiatric:         Behavior: Behavior normal.       Vitals:    09/02/22 0922   BP: 112/68   Pulse: 72   Resp: 16   Temp: 97.5 °F (36.4 °C)   SpO2: 100%         Assessment and Plan     1.   Tremor  - Has brought differential to current time, differential currently includes physiological tremor VS resting tremor VS neurological insult VS electrolyte derangement VS multiple sclerosis induced  - We will check labs this clinic visit including TSH, CMP, B12/folate, HIV, ferritin, CBC.  - Unlikely to be any significant derangement. Likely symptoms related to mechanical tremor resulting from muscle weakness/atrophy.   Did advise patient to conduct conservative strengthening exercises      RTO 1 month  Case discussed with Dr. Melody Feng

## 2022-09-02 NOTE — PROGRESS NOTES
S: 40 y.o. female who presents with CC of tremors. This has been present for about a year. No precipitating factors. It only occurs when holding an object in her hand. Has Hx of MS followed by neurology. There is a [facility] of tremor in her mother. She does take abilify for her depression. O: VS: /68   Pulse 72   Temp 97.5 °F (36.4 °C) (Temporal)   Resp 16   Ht 5' 7\" (1.702 m)   Wt 162 lb (73.5 kg)   LMP 08/15/2022   SpO2 100%   BMI 25.37 kg/m²    General: NAD, appropriate affect and grooming   CV:  RRR, no gallops, rubs, or murmurs   Resp: CTAB   Abd:  Soft, nontender   Ext:  No edema   Neuro:  normal overall; no focal deficits; 5/5 muscle strength in the BLUE  Impression/Plan:   Kinetic Tremor-has seen neuro without specific dx; may be some muscle weakness; no significant resting tremor. Check TSH, CMP, B12/folate, HIV (patient requested), ferritin, CBC    Attending Physician Statement  I have discussed the case, including pertinent history and exam findings with the resident. I agree with the documented assessment and plan.

## 2022-09-06 ENCOUNTER — HOSPITAL ENCOUNTER (OUTPATIENT)
Dept: SPEECH THERAPY | Age: 42
Setting detail: THERAPIES SERIES
Discharge: HOME | End: 2022-09-06
Payer: COMMERCIAL

## 2022-09-06 LAB — HIV-1 AND HIV-2 ANTIBODIES: NORMAL

## 2022-09-06 PROCEDURE — 97130 THER IVNTJ EA ADDL 15 MIN: CPT

## 2022-09-06 PROCEDURE — 97129 THER IVNTJ 1ST 15 MIN: CPT

## 2022-09-13 ENCOUNTER — HOSPITAL ENCOUNTER (OUTPATIENT)
Dept: SPEECH THERAPY | Age: 42
Setting detail: THERAPIES SERIES
Discharge: HOME | End: 2022-09-13
Payer: COMMERCIAL

## 2022-09-13 PROCEDURE — 97129 THER IVNTJ 1ST 15 MIN: CPT

## 2022-09-13 NOTE — PROGRESS NOTES
Lay Mcadams evaluated through a synchronous (real-time) audio-video encounter. The patient (or guardian if applicable) is aware that this is a billable service, which includes applicable co-pays. This virtual visit was conducted with patient's (and/or legal guardian's) consent. The visit was conducted pursuant to the emergency declaration under the [address] [place] [address] and the [employer] and [employer] General Act. Patient identification was verified, and a caregiver was present when appropriate. The patient was located in a state where the provider was licensed to provide care. 13 minute individual virtual session through [employer] as pt was late logging on. The pt was pleasant and attentive and engaged in conversational speech. The patient recalled 3 words and then used rehearsing to recall words from \"largest to [employer] or \"tallest to shortest\", for example, with 60% accuracy. The pt answered temporal orientation questions with good ability. Ideas for carryover to home were provided. Continue POC.     Sebas Andrews M.A., [address] [facility]  Speech Pathologist  9/13/2022    [address]  therapeutic interventions that focus on cognitive function , initial  15 min

## 2022-09-20 ENCOUNTER — HOSPITAL ENCOUNTER (OUTPATIENT)
Dept: SPEECH THERAPY | Age: 42
Setting detail: THERAPIES SERIES
Discharge: HOME | End: 2022-09-20
Payer: COMMERCIAL

## 2022-09-20 PROCEDURE — 97129 THER IVNTJ 1ST 15 MIN: CPT

## 2022-09-20 PROCEDURE — 97130 THER IVNTJ EA ADDL 15 MIN: CPT

## 2022-09-20 NOTE — PROGRESS NOTES
Lay Mcadams evaluated through a synchronous (real-time) audio-video encounter. The patient (or guardian if applicable) is aware that this is a billable service, which includes applicable co-pays. This virtual visit was conducted with patient's (and/or legal guardian's) consent. The visit was conducted pursuant to the emergency declaration under the [address] [place] [address] and the [employer] and [employer] General Act. Patient identification was verified, and a caregiver was present when appropriate. The patient was located in a state where the provider was licensed to provide care. 30 minute individual virtual session through [employer]. The pt was pleasant and attentive and engaged in conversational speech. The patient recalled 4 words and then used rehearsing to recall in the order in which they would normally occur with 100% accuracy. She completed a working memory task with 50% accuracy using rehearsing. Ideas for carryover to home were provided. Continue POC.     Rand Claire M.A., [address] [facility]  Speech Pathologist  9/20/2022    [address]806  therapeutic interventions that focus on cognitive function , initial  15 min  [id number]  therapeutic interventions that focus on cognitive function, each additional 15 min

## 2022-09-27 ENCOUNTER — HOSPITAL ENCOUNTER (OUTPATIENT)
Dept: SPEECH THERAPY | Age: 42
Setting detail: THERAPIES SERIES
Discharge: HOME | End: 2022-09-27
Payer: COMMERCIAL

## 2022-09-27 PROCEDURE — 97129 THER IVNTJ 1ST 15 MIN: CPT

## 2022-09-27 PROCEDURE — 97130 THER IVNTJ EA ADDL 15 MIN: CPT

## 2022-09-27 NOTE — PROGRESS NOTES
Lay Mcadams evaluated through a synchronous (real-time) audio-video encounter. The patient (or guardian if applicable) is aware that this is a billable service, which includes applicable co-pays. This virtual visit was conducted with patient's (and/or legal guardian's) consent. The visit was conducted pursuant to the emergency declaration under the [address] [place] [address] and the [employer] and [employer] General Act. Patient identification was verified, and a caregiver was present when appropriate. The patient was located in a state where the provider was licensed to provide care. 30 minute individual virtual session through [employer]. The pt was pleasant and attentive and engaged in conversational speech. The patient recalled 4 words and then used rehearsing to recall in the order in which they would normally occur with 100% accuracy. She completed a working memory task with 100% accuracy given increased time. She required mild cues to use rehearsing. The patient named up to 15 items/category given cues to use sub-categories. Ideas for carryover to home were provided. Continue POC.     Sabine Londono M.A., [address] [facility]  Speech Pathologist  9/27/2022    [address]103  therapeutic interventions that focus on cognitive function , initial  15 min  [id number]  therapeutic interventions that focus on cognitive function, each additional 15 min

## 2022-10-03 ENCOUNTER — OFFICE VISIT (OUTPATIENT)
Dept: FAMILY MEDICINE CLINIC | Age: 42
End: 2022-10-03
Payer: COMMERCIAL

## 2022-10-03 VITALS
TEMPERATURE: 98 F | OXYGEN SATURATION: 99 % | RESPIRATION RATE: 16 BRPM | DIASTOLIC BLOOD PRESSURE: 68 MMHG | WEIGHT: 156 LBS | BODY MASS INDEX: 24.48 KG/M2 | HEART RATE: 96 BPM | SYSTOLIC BLOOD PRESSURE: 108 MMHG | HEIGHT: 67 IN

## 2022-10-03 DIAGNOSIS — H93.13 TINNITUS OF BOTH EARS: ICD-10-CM

## 2022-10-03 DIAGNOSIS — G35 MULTIPLE SCLEROSIS (HCC): ICD-10-CM

## 2022-10-03 DIAGNOSIS — Z23 NEED FOR INFLUENZA VACCINATION: Primary | ICD-10-CM

## 2022-10-03 PROCEDURE — G8482 FLU IMMUNIZE ORDER/ADMIN: HCPCS | Performed by: FAMILY MEDICINE

## 2022-10-03 PROCEDURE — 99213 OFFICE O/P EST LOW 20 MIN: CPT | Performed by: FAMILY MEDICINE

## 2022-10-03 PROCEDURE — G8420 CALC BMI NORM PARAMETERS: HCPCS | Performed by: FAMILY MEDICINE

## 2022-10-03 PROCEDURE — 1036F TOBACCO NON-USER: CPT | Performed by: FAMILY MEDICINE

## 2022-10-03 PROCEDURE — G8427 DOCREV CUR MEDS BY ELIG CLIN: HCPCS | Performed by: FAMILY MEDICINE

## 2022-10-03 RX ORDER — DIMETHYL FUMARATE 120-240 MG
120 KIT ORAL 2 TIMES DAILY
COMMUNITY
Start: 2022-10-03

## 2022-10-03 NOTE — PROGRESS NOTES
S: 40 y.o. female here for tremor, HA, tinnitus. Tremor for over a year. H/o MS, sees neuro. Postural tremor hands. forearms. FHx tremor in mother. HAs for a couple weeks. Several times per week. 5-10 minutes. Occasional tinnitus, ? Hearing loss. No phono/photo/FND/n/v. No aura. 2 cups coffee. No EtOH or drugs. Labs ok. No vertigo. O: VS: /68   Pulse 96   Temp 98 °F (36.7 °C) (Temporal)   Resp 16   Ht 5' 7\" (1.702 m)   Wt 156 lb (70.8 kg)   SpO2 99%   BMI 24.43 kg/m²    General: NAD, alert and interacting appropriately. No papilledema appreciable. Neuro: CNs intact. No FND. B/l     Impression: tremor 2/2 ET vs coffee vs MS vs med side effect  Plan:   MRI brain  F/u Neuro        Attending Physician Statement  I have discussed the case, including pertinent history and exam findings with the resident. I agree with the documented assessment and plan.

## 2022-10-03 NOTE — PROGRESS NOTES
[facility]  Department of Family Medicine  Family Medicine Residency Program      Patient:  Ezra Cornejo 40 y.o. female     Date of Service: 10/3/22      Chiefcomplaint:   Chief Complaint   Patient presents with    Tremors     Left hand more then right    Tinnitus         History of Present Illness     This is a 60-year-old female in office regarding history of tremor, new headache, tenderness. Patient notes her tremors been present for over 1 year. She does have a previous history of MS diagnosed in 2018. She does follow with neurology. She is maintained on Tecfidera chronically. Symptoms mostly secondary to postural tremor. It is mainly located in her bilateral hands and forearm. She does have a family history of tremor in her mother. She does not currently drink any alcohol or use any drugs. Recent labs including CMP, electrolytes, B12 and folate were normal.  TSH was also normal.    Patient also with a complaint of headaches that have been present for several weeks. She notes they occur several times per week. They last approximately 5 to 10 minutes. She does not recall any inciting event for the headaches such as trauma, MVC, assault, head injury. She does drink several cups of coffee per week. No significant use of NSAIDs such as ibuprofen. No aura, no neurological signs or symptoms associated with the headaches. No phonophobia, photophobia. Does not ever have any nausea, vomiting with the headaches, before or after the headache occurs. No headaches or nausea, lightheadedness when laying down. Allergies:    Patient has no known allergies.     Medication List:    Current Outpatient Medications   Medication Sig Dispense Refill    dimethyl Fumarate (TECFIDERA) 120 & 240 MG Take 120 mg by mouth 2 times daily      ARIPiprazole (ABILIFY) 5 MG tablet Take 1 tablet by mouth nightly      Cholecalciferol (VITAMIN D) 50 MCG (2000 UT) CAPS capsule Take 1 capsule by mouth in the morning. fluticasone (FLONASE) 50 MCG/ACT nasal spray 2 sprays by Nasal route daily 1 each 2    Multiple Vitamins-Minerals (THERAPEUTIC MULTIVITAMIN-MINERALS) tablet Take 1 tablet by mouth daily      aspirin 81 MG tablet Take 81 mg by mouth daily        No current facility-administered medications for this visit. Review of Systems   Constitutional:  Negative for chills and fever. Respiratory:  Negative for cough, shortness of breath and wheezing. Cardiovascular:  Negative for chest pain. Gastrointestinal:  Negative for abdominal pain, diarrhea and vomiting. Musculoskeletal:  Negative for back pain and neck pain. Skin:  Negative for color change. Neurological:  Negative for dizziness, tremors, weakness and light-headedness. Physical Exam   Physical Exam  Constitutional:       Appearance: She is well-developed. HENT:   Ophthalmological-no papilledema     Head: Normocephalic. Right Ear: External ear normal.      Left Ear: External ear normal.   Cardiovascular:      Rate and Rhythm: Normal rate and regular rhythm. Heart sounds: Normal heart sounds. No murmur heard. Pulmonary:      Effort: Pulmonary effort is normal.      Breath sounds: Normal breath sounds. No wheezing. Abdominal:      Palpations: Abdomen is soft. Tenderness: There is no abdominal tenderness. Musculoskeletal:         General: Normal range of motion. Cervical back: Normal range of motion. Skin:     General: Skin is warm. Findings: No erythema. Neurological:      Mental Status: She is alert and oriented to person, place, and time. Cranial nerves intact 2-12, no focal logical deficits, mild resting tremor noted. Normal strength in the upper and lower extremities 5/5 able to lift against gravity as well.   Psychiatric:         Behavior: Behavior normal.     Vitals:    10/03/22 0934   BP: 108/68   Pulse: 96   Resp: 16   Temp: 98 °F (36.7 °C)   SpO2: 99%         Assessment and Plan     1. Tremor  - Broad differential at the current time, symptoms likely secondary to higher caffeine intake VS multiple sclerosis progression VS medication side effects VS occult  - Given last MRI in 2018, likely there has been progression of multiple sclerosis disease. Will obtain MRI brain. Will obtain MRI brain with and without contrast to rule out occult pathology such as mass lesion.  - Recommend follow-up with neurology for further evaluation as well. 2.  Headache  - Likely tension headaches, recommend increasing hydration, maintaining adequate intake of fluids/food.   NSAIDs such as ibuprofen, Tylenol as needed  - Brain imaging      RTO 1 month  Case discussed with Dr. Ani Johnson

## 2022-10-04 ENCOUNTER — HOSPITAL ENCOUNTER (OUTPATIENT)
Dept: SPEECH THERAPY | Age: 42
Setting detail: THERAPIES SERIES
Discharge: HOME | End: 2022-10-04
Payer: COMMERCIAL

## 2022-10-04 PROCEDURE — 97129 THER IVNTJ 1ST 15 MIN: CPT

## 2022-10-04 PROCEDURE — 97130 THER IVNTJ EA ADDL 15 MIN: CPT

## 2022-10-04 NOTE — PROGRESS NOTES
Lay Mcadams evaluated through a synchronous (real-time) audio-video encounter. The patient (or guardian if applicable) is aware that this is a billable service, which includes applicable co-pays. This virtual visit was conducted with patient's (and/or legal guardian's) consent. The visit was conducted pursuant to the emergency declaration under the [address] [place] [address] and the [employer] and [employer] General Act. Patient identification was verified, and a caregiver was present when appropriate. The patient was located in a state where the provider was licensed to provide care. 30 minute individual virtual session through [employer]. The pt was pleasant and attentive and engaged in conversational speech. The patient listened to complex paragraphs read by the SLP. She answered auditory processing questions containing temporal concepts with 80% accuracy. She used the visualization strategy with good ability given mild verbal cues. Ideas for carryover to home were provided. Continue POC.     Mary Lou CONNOLLY.TYLER, [address] [facility]  Speech Pathologist  10/4/2022    [address]0006  therapeutic interventions that focus on cognitive function , initial  15 min  [id number]  therapeutic interventions that focus on cognitive function, each additional 15 min

## 2022-10-18 ENCOUNTER — HOSPITAL ENCOUNTER (OUTPATIENT)
Dept: SPEECH THERAPY | Age: 42
Setting detail: THERAPIES SERIES
Discharge: HOME | End: 2022-10-18
Payer: COMMERCIAL

## 2022-10-18 PROCEDURE — 97130 THER IVNTJ EA ADDL 15 MIN: CPT

## 2022-10-18 PROCEDURE — 97129 THER IVNTJ 1ST 15 MIN: CPT

## 2022-10-18 NOTE — PROGRESS NOTES
Lay Mcadams evaluated through a synchronous (real-time) audio-video encounter. The patient (or guardian if applicable) is aware that this is a billable service, which includes applicable co-pays. This virtual visit was conducted with patient's (and/or legal guardian's) consent. The visit was conducted pursuant to the emergency declaration under the [address] [place] [address] and the [employer] and [employer] General Act. Patient identification was verified, and a caregiver was present when appropriate. The patient was located in a state where the provider was licensed to provide care. 30 minute individual virtual session through [employer]. The pt was pleasant and attentive and engaged in conversational speech. The patient listened to complex paragraphs read by the SLP. She recalled details from the paragraphs with 45% accuracy. She used the visualization and association strategies with good ability independently. She demonstrated good use of the rehearsing strategy given mild verbal cues. Pt would like to address simple math/money as she may get a  job. Ideas for carryover to home were provided. Continue POC.     Sheree Watkins M.A., Chad Ribeiro  Speech Pathologist  10/18/2022    [id number]  therapeutic interventions that focus on cognitive function , initial  15 min  [id number]  therapeutic interventions that focus on cognitive function, each additional 15 min

## 2022-10-25 ENCOUNTER — APPOINTMENT (OUTPATIENT)
Dept: SPEECH THERAPY | Age: 42
End: 2022-10-25
Payer: COMMERCIAL

## 2022-10-25 ENCOUNTER — SCHEDULED TELEPHONE ENCOUNTER (OUTPATIENT)
Dept: NEUROLOGY | Age: 42
End: 2022-10-25
Payer: COMMERCIAL

## 2022-10-25 DIAGNOSIS — G35 MULTIPLE SCLEROSIS (HCC): Primary | ICD-10-CM

## 2022-10-25 PROCEDURE — 99448 NTRPROF PH1/NTRNET/EHR 21-30: CPT | Performed by: PSYCHIATRY & NEUROLOGY

## 2022-10-25 NOTE — PROGRESS NOTES
[address] [facility]. Gail Carter M.D., F.A.C.P. [facility], [address] Kun Cheney was a 50-year-old right handed woman who was referred for evaluation and management of longstanding multiple sclerosis and congenital spastic paraparesis. The patient remained a poor historian. Her mother provided most of the history. This visit was again by telephone. Her medications were now fluticasone, multivitamins, aspirin, aripiprazole and dimethyl fumarate. She was referred after diagnosed with multiple sclerosis. She always walked slowly. There were more difficulties with gait the last 7 years. Her legs felt numb and clumsy. She experienced sensory loss around her buttocks and perineum. There was never arm involvement. There were always longstanding cognitive issues. Her mother denied additional speech difficulties, visual problems, spinning sensations, swallowing or chewing abnormalities or loss of consciousness. She tolerated dimethyl fumarate well, without stomach upset. The patient denied progression of her disease. There were no additional rashes. Her mother noted intermittent tremors of her outstretched hands; she and the patient's grandmother were afflicted with similar shakings. Her mother again felt she did not require treatment for these tremors. She was the product of a normal pregnancy and delivery. She reached all her milestones late. She did not walk until 4years of age. She spoke afterwards. She was always slow and unable to play with her peers. There were never seizures. There continued moderate cognitive difficulties. Her mother denied worsening deficits. Her mother denied other neurological issues. However, the patient noted rare, tingling sensations in her head, lasting only minutes. The patient did not want to take ocrelizumab infusions. Her mother felt her depression was improving with aripiprazole, now 15 mg daily.     She was eating and sleeping well. She obtained bilateral AFO braces, which improved her walking. She received the COVID-19 vaccinations and boosters. Review of systems was otherwise unremarkable. Objective:     She was afebrile in no acute distress. She was breathing comfortably, without chest pain or shortness of breath. There were no palpitations. Her ski was unremarkable. Her limbs displayed no abnormalities. Neurological examination produced an intact mental status except for limited insigh. I again heard minimal, spastic, ataxic dysarthria. All responses were slow and inaccurate at times. Her mother denied any cranial nerve abnormalities. Strength was intact, with persistent spastic legs. She appreciated light touch in all limbs. Finger-to-nose and heel shin testing were performed slowly, but accurately. She again displayed a spastic paraparetic gait. Her neurological examination was reportedly unchanged. Laboratory/Radiology:  ry/Radiology:     Recent CBC with differential was unremarkable. CMP was unremarkable except for a absolute lymphocyte count of 1000. The past HUGO virus assay was negative. TSH was 1.1. HIV screen was negative. Cholecalciferol levels were 596. MRI brain with/without contrast years ago revealed moderate white matter disease in the periventricular regions and gray-white matter junctions, with no active lesions. MRI of her cervical spine produced abnormal signal in the upper cord without active disease. MRIs of her lumbosacral spine and thoracic spine displayed demyelinating lesions as well. 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 There were never seizures. Her gait worsened from multiple sclerosis. She remained on dimethyl fumarate, without reported progression, and improvement with AFO bracing.   She may continue with dimethyl fumarate. MRIs of her brain were pending; if there were additional lesions; her mother agreed to pursue ocrelizumab administration. Her depression was improving with aripiprazole. Her postural tremors are likely familial, again not requiring treatment. She remains stable medically. Plan:     She will continue with dimethyl fumarate for now. We are awaiting the MRI results. She will return in 5 months, to see ANGELIKA Santos. Herberth Hawley She will call anytime if problems arise. I spent 30 minutes with the patient with over 50 % spent in counseling and disease management. All patient issues were addressed and all questions were answered. Ryan Smith was a 40year old individual who was evaluated via telephone on 10/25/2022. Consent:  She and/or health care decision maker was aware that that she may receive a bill for this telephone service, depending on her insurance coverage, and has provided verbal consent to proceed--- yes. Documentation:  I communicated with the patient and/or health care decision maker about her multiple sclerosis. Details of this discussion including any medical advice provided per telephone. I affirmed this is a Patient Initiated Episode with an Established Patient who has not had a related appointment within my department in the past 7 days or scheduled within the next 24 hours. Again I spent 30 minutes with the patient.       Marcelle Lake MD

## 2022-10-25 NOTE — PROGRESS NOTES
Dimitri Arita was read the following message We want to confirm that, for purposes of billing, this is a virtual visit with your provider for which we will submit a claim for reimbursement with your insurance company. You will be responsible for any copays, coinsurance amounts or other amounts not covered by your insurance company. If you do not accept this, unfortunately we will not be able to schedule or proceed with a virtual visit with the provider. Do you accept? Tete Whitney responded Yes .

## 2022-10-27 ENCOUNTER — HOSPITAL ENCOUNTER (OUTPATIENT)
Dept: SPEECH THERAPY | Age: 42
Setting detail: THERAPIES SERIES
Discharge: HOME | End: 2022-10-27
Payer: COMMERCIAL

## 2022-10-27 PROCEDURE — 97130 THER IVNTJ EA ADDL 15 MIN: CPT

## 2022-10-27 PROCEDURE — 97129 THER IVNTJ 1ST 15 MIN: CPT

## 2022-10-27 NOTE — PROGRESS NOTES
Lay Mcadams evaluated through a synchronous (real-time) audio-video encounter. The patient (or guardian if applicable) is aware that this is a billable service, which includes applicable co-pays. This virtual visit was conducted with patient's (and/or legal guardian's) consent. The visit was conducted pursuant to the emergency declaration under the [address] [place] [address] and the [employer] and [employer] General Act. Patient identification was verified, and a caregiver was present when appropriate. The patient was located in a state where the provider was licensed to provide care. 30 minute individual virtual session through [employer]. The pt was pleasant and attentive and engaged in conversational speech. Pt reports that she is using the rehearsing strategy at home. Pt would like to address simple math/money as she may get a  job. The pt added change at home given various coins with 67% accuracy and some repetition required. Strategies to use paper and pencil during next week's session was recommended. Ideas for carryover to home were provided. Continue POC.     Devon De Guzman M.A.  Speech Pathologist  10/27/2022    [id number]  therapeutic interventions that focus on cognitive function , initial  15 min  [id number]  therapeutic interventions that focus on cognitive function, each additional 15 min

## 2022-11-01 ENCOUNTER — HOSPITAL ENCOUNTER (OUTPATIENT)
Dept: SPEECH THERAPY | Age: 42
Setting detail: THERAPIES SERIES
Discharge: HOME | End: 2022-11-01
Payer: COMMERCIAL

## 2022-11-01 PROCEDURE — 97130 THER IVNTJ EA ADDL 15 MIN: CPT

## 2022-11-01 PROCEDURE — 97129 THER IVNTJ 1ST 15 MIN: CPT

## 2022-11-01 NOTE — PROGRESS NOTES
Lay Mcadams evaluated through a synchronous (real-time) audio-video encounter. The patient (or guardian if applicable) is aware that this is a billable service, which includes applicable co-pays. This virtual visit was conducted with patient's (and/or legal guardian's) consent. The visit was conducted pursuant to the emergency declaration under the [address] [place] [address] and the [employer] and [employer] General Act. Patient identification was verified, and a caregiver was present when appropriate. The patient was located in a state where the provider was licensed to provide care. 30 minute individual virtual session through [employer]. The pt was pleasant and attentive and engaged in conversational speech. Pt reports that she is using the rehearsing strategy at home. Pt would like to address simple math/money as she may get a  job. She answered auditory processing questions containing math and math concepts with 63% accuracy. The pt added change with <40% accuracy and some repetition required. Strategies to use paper and pencil was recommended. Ideas for carryover to home were provided. Continue POC.     Diamond Vásquez M.A., [address] [facility]  Speech Pathologist  11/1/2022    71862  therapeutic interventions that focus on cognitive function , initial  15 min  72058  therapeutic interventions that focus on cognitive function, each additional 15 min

## 2022-11-08 ENCOUNTER — HOSPITAL ENCOUNTER (OUTPATIENT)
Dept: SPEECH THERAPY | Age: 42
Setting detail: THERAPIES SERIES
Discharge: HOME | End: 2022-11-08
Payer: COMMERCIAL

## 2022-11-08 PROCEDURE — 97129 THER IVNTJ 1ST 15 MIN: CPT

## 2022-11-08 NOTE — PROGRESS NOTES
Lay Mcadams evaluated through a synchronous (real-time) audio-video encounter. The patient (or guardian if applicable) is aware that this is a billable service, which includes applicable co-pays. This virtual visit was conducted with patient's (and/or legal guardian's) consent. The visit was conducted pursuant to the emergency declaration under the [address] [place] [address] and the [employer] and [employer] General Act. Patient identification was verified, and a caregiver was present when appropriate. The patient was located in a state where the provider was licensed to provide care. 30 minute individual virtual session through [employer]. The pt was pleasant and attentive and engaged in conversational speech. Pt reports that she is using the rehearsing strategy at home. Pt would like to address simple math/money as she may get a  job. Pt answered auditory processing questions containing math and math concepts with 50% accuracy. Strategies to use paper and pencil was recommended. Pt required moderate cues and explanation for math terms e.g. \"difference\" and \"sum\". Ideas for carryover to home were provided. Continue POC.     Evangelista Davidson [facility].A. [facility]  Speech Pathologist  11/8/2022    [id number]  therapeutic interventions that focus on cognitive function , initial  15 min

## 2022-11-15 ENCOUNTER — HOSPITAL ENCOUNTER (OUTPATIENT)
Dept: SPEECH THERAPY | Age: 42
Setting detail: THERAPIES SERIES
Discharge: HOME | End: 2022-11-15
Payer: COMMERCIAL

## 2022-11-15 ENCOUNTER — HOSPITAL ENCOUNTER (OUTPATIENT)
Dept: MRI IMAGING | Age: 42
Discharge: HOME | End: 2022-11-17
Payer: COMMERCIAL

## 2022-11-15 DIAGNOSIS — H93.13 TINNITUS OF BOTH EARS: ICD-10-CM

## 2022-11-15 DIAGNOSIS — G35 MULTIPLE SCLEROSIS (HCC): ICD-10-CM

## 2022-11-15 PROCEDURE — A9579 GAD-BASE MR CONTRAST NOS,1ML: HCPCS | Performed by: RADIOLOGY

## 2022-11-15 PROCEDURE — 70553 MRI BRAIN STEM W/O & W/DYE: CPT

## 2022-11-15 PROCEDURE — 6360000004 HC RX CONTRAST MEDICATION: Performed by: RADIOLOGY

## 2022-11-15 PROCEDURE — 97130 THER IVNTJ EA ADDL 15 MIN: CPT

## 2022-11-15 PROCEDURE — 97129 THER IVNTJ 1ST 15 MIN: CPT

## 2022-11-15 RX ADMIN — GADOTERIDOL 14 ML: 279.3 INJECTION, SOLUTION INTRAVENOUS at 17:31

## 2022-11-15 NOTE — PROGRESS NOTES
Pt cx as she has an appointment with her physician. Continue POC.     Reginald Watters M.A. [facility]  Speech Pathologist  11/15/2022

## 2022-11-15 NOTE — PROGRESS NOTES
Lay Mcadams evaluated through a synchronous (real-time) audio-video encounter. The patient (or guardian if applicable) is aware that this is a billable service, which includes applicable co-pays. This virtual visit was conducted with patient's (and/or legal guardian's) consent. The visit was conducted pursuant to the emergency declaration under the [address] [place] [address] and the [employer] and [employer] General Act. Patient identification was verified, and a caregiver was present when appropriate. The patient was located in a state where the provider was licensed to provide care. 30 minute individual virtual session through [employer]. The SLP called the pt to offer an earlier time as the pt has an MRI scheduled at 3:00. The SLP started testing for the pt's cognitive re-evaluation via the [facility]. (RIPA-2). The pt was pleasant and attentive and engaged in conversational speech. Pt reports that she is using the rehearsing strategy at home. Ideas for carryover to home were provided. Continue POC.     Toya Harkins M.A., [address] [facility]  Speech Pathologist  11/15/2022    66527  therapeutic interventions that focus on cognitive function , initial  15 min  [id number]  therapeutic interventions that focus on cognitive function, each additional 15 min

## 2022-11-22 ENCOUNTER — HOSPITAL ENCOUNTER (OUTPATIENT)
Dept: SPEECH THERAPY | Age: 42
Setting detail: THERAPIES SERIES
Discharge: HOME | End: 2022-11-22
Payer: COMMERCIAL

## 2022-11-22 PROCEDURE — 97129 THER IVNTJ 1ST 15 MIN: CPT

## 2022-11-22 PROCEDURE — 97130 THER IVNTJ EA ADDL 15 MIN: CPT

## 2022-11-22 NOTE — PROGRESS NOTES
Lay Mcadams evaluated through a synchronous (real-time) audio-video encounter. The patient (or guardian if applicable) is aware that this is a billable service, which includes applicable co-pays. This virtual visit was conducted with patient's (and/or legal guardian's) consent. The visit was conducted pursuant to the emergency declaration under the [address] [place] [address] and the [employer] and [employer] General Act. Patient identification was verified, and a caregiver was present when appropriate. The patient was located in a state where the provider was licensed to provide care. 30 minute individual virtual session through [employer]. The SLP continued and completed testing for the pt's cognitive re-evaluation via the [facility]. (RIPA-2). Full report to follow. Ideas for carryover to home were provided. Continue POC.     Syd Erazo M.A., [id number] [place] [address]  Speech Pathologist  11/22/2022    [id number]  therapeutic interventions that focus on cognitive function , initial  15 min  [id number]  therapeutic interventions that focus on cognitive function, each additional 15 min

## 2022-11-28 ENCOUNTER — HOSPITAL ENCOUNTER (OUTPATIENT)
Dept: SPEECH THERAPY | Age: 42
Setting detail: THERAPIES SERIES
Discharge: HOME | End: 2022-11-28
Payer: COMMERCIAL

## 2022-11-28 NOTE — PROGRESS NOTES
[facility]  Outpatient Speech Therapy  Phone: [phone] Fax: [phone]       SPEECH/LANGUAGE PATHOLOGY  SPEECH/LANGUAGE/COGNITIVE RE-EVALUATION         PATIENT NAME:  Isabel Kim  (female)     MRN:  [id number]    :  1980  (40 y.o.)  STATUS:  Outpatient    TODAY'S DATE:  2022  REFERRING PROVIDER:    Dr. Linnea Friedman   SPECIFIC PROVIDER ORDER: SLP cognitive language evaluation  Date of order:  2021  REASON FOR REFERRAL: cognitive communication deficit R41.841; pt is also diagnosed with MS    REFERRING DIAGNOSIS: Cognitive communication deficit [R41.841]    The pt was seen for a cognitive re-evaluation on 11/15/2022 and 2022. The pt is seen for virtual speech therapy once/week through [facility]. Attendance in therapy is consistent. SPEECH THERAPY  PLAN OF CARE   The speech therapy  POC is established based on physician order, speech pathology diagnosis and results of clinical assessment    Speech Pathology intervention is recommended 1 time/week for 6-12 weeks with emphasis on the following:      SPEECH PATHOLOGY DIAGNOSIS:    Moderate-marked cognitive deficit    Conditions Requiring Skilled Therapeutic Intervention for speech, language and/or cognition    Cognitive linguistic impairment  Decreased short term memory  Decreased problem solving skills   Decreased thought organization  Decreased auditory processing    Specific Speech Therapy Interventions to Include:   Compensatory strategy training  Home exercise program  Therapeutic tasks for Cognition    Specific instructions for next treatment:  immediate memory tasks with training in use of compensatory strategies    SHORT/LONG TERM GOALS  LTG: To improve memory, thought organization and problem-solving to complete functional tasks including recall of dr appointments, taking medications, etc 80% of the time. ST.  To improve immediate memory to >80% accuracy using compensatory strategies such as rehearsing, grouping and chunking   To improve temporal orientation (remote memory) to >80% accuracy. To improve reasoning/problem-solving to >80% accuracy    To improve thought organization for daily tasks to >80% accuracy   To improve auditory processing and retention to >80% accuracy. To provide patient education and tasks to increase carryover to home once/week. Stimulus questions were obtained from the RIPA-2. Severity ratings for each subtest were determined as follows:         Subtest Raw Score Percentile Standard Score Severity          Immediate Memory   16 16 7 severe   Recent Memory   30 95 15 Mild-WFL   Temporal Orientation  (Recent Memory) 30 91 14 Mild-WFL   Temporal Orientation  (Remote Memory) 29 84 13 moderate   Spatial Orientation   24 37 9 marked   Orientation to Environment Not tested- for hospital ADL's N/A N/A N/A   Recall of General Information Not tested N/A N/A N/A   Problem Solving & Abstract Reasoning 22 50 10 Marked   Organization   24 75 12 moderate   Auditory Processing & Retention 26 50 10 marked       VARIANT OBSERVATIONS     Present Absent   Error (e) x    Perseveration (p) x    Repeat Instructions/Stimulus (r) x    Denial/Refusal (d)  x   Delayed Response (dl) x    Confabulation (c)  x   Partially Correct (pc) x    Irrelevant (i)  x   Tangential (t) x    Self-corrected (sc) x      Comments: The patient has demonstrated mild improvements in recent memory, temporal orientation (remote memory), spatial orientation and auditory processing. The pt has demonstrated improved ability to implement rehearsing, visualizing and grouping strategies during therapy sessions. It is felt that the pt will benefit from continued cognitive therapy.     Patient stated goals: Agreed with above     Rehabilitation Potential/Prognosis: good                  CLINICAL OBSERVATIONS NOTED DURING THE EVALUATION  Latent responses, Perseveration errors, Cueing was required                  EDUCATION:   The Speech Language Pathologist (SLP) completed education regarding results of evaluation and that intervention is warranted at this time. Learner: Patient  Education: Reviewed results and recommendations of this evaluation  Evaluation of Education:  Mitcheal Po understanding    This plan may be re-evaluated and revised as warranted. Treatment goals discussed with Patient   The Patient understand(s) the diagnosis, prognosis and plan of care     Evaluation Time includes thorough review of current medical information, gathering information on past medical history/social history and prior level of function, completion of standardized testing/informal observation of tasks, assessment of data and education on plan of care and goals. CPT code:    64809  eval speech sound lang comprehension      The admitting diagnosis and active problem list, as listed below have been reviewed prior to initiation of this evaluation. ACTIVE PROBLEM LIST:   Patient Active Problem List   Diagnosis    Patient overweight    Dysuria    Perennial allergic rhinitis    Multiple sclerosis ([facility] [address]    Bipolar 2 disorder, major depressive episode ([facility] [address]    Suicide attempt [facility])       King Quintero M.A., [facility]  Speech Pathologist  11/28/2022     [address]     Phone: [phone]     If you have any questions or concerns, please don't hesitate to call. Thank you for your referral.    Physician/Provider Signature:________________________________Date:__________________  By signing above, the therapists plan is approved by the physician/provider.

## 2022-11-29 ENCOUNTER — HOSPITAL ENCOUNTER (OUTPATIENT)
Dept: SPEECH THERAPY | Age: 42
Setting detail: THERAPIES SERIES
Discharge: HOME | End: 2022-11-29
Payer: COMMERCIAL

## 2022-11-29 PROCEDURE — 97129 THER IVNTJ 1ST 15 MIN: CPT

## 2022-11-29 PROCEDURE — 97130 THER IVNTJ EA ADDL 15 MIN: CPT

## 2022-11-29 NOTE — PROGRESS NOTES
Lay Mcadams evaluated through a synchronous (real-time) audio-video encounter. The patient (or guardian if applicable) is aware that this is a billable service, which includes applicable co-pays. This virtual visit was conducted with patient's (and/or legal guardian's) consent. The visit was conducted pursuant to the emergency declaration under the [address] [place] [address] and the [employer] and [employer] General Act. Patient identification was verified, and a caregiver was present when appropriate. The patient was located in a state where the provider was licensed to provide care. 30 minute individual virtual session through [employer]. The pt was pleasant and attentive and engaged in conversational speech. Pt reports that she is using the rehearsing strategy at home. Pt recalled 3 words and determined which 2 words had the same sound in the initial, medial or final position with 50% accuracy. She determined if appropriate details were given or quality of information was sufficient given simple social scenarios with moderate verbal cues. Ideas for carryover to home were provided. Continue POC.     Meghna Deal M.A., [address] [facility]  Speech Pathologist  11/29/2022    76525  therapeutic interventions that focus on cognitive function , initial  15 min  78793  therapeutic interventions that focus on cognitive function, each additional 15 min

## 2022-12-06 ENCOUNTER — APPOINTMENT (OUTPATIENT)
Dept: SPEECH THERAPY | Age: 42
End: 2022-12-06
Payer: COMMERCIAL

## 2022-12-06 ENCOUNTER — TELEPHONE (OUTPATIENT)
Dept: FAMILY MEDICINE CLINIC | Age: 42
End: 2022-12-06

## 2022-12-13 ENCOUNTER — HOSPITAL ENCOUNTER (OUTPATIENT)
Dept: SPEECH THERAPY | Age: 42
Setting detail: THERAPIES SERIES
Discharge: HOME | End: 2022-12-13
Payer: COMMERCIAL

## 2022-12-13 PROCEDURE — 97129 THER IVNTJ 1ST 15 MIN: CPT

## 2022-12-13 NOTE — PROGRESS NOTES
Lay Mcadams evaluated through a synchronous (real-time) audio-video encounter. The patient (or guardian if applicable) is aware that this is a billable service, which includes applicable co-pays. This virtual visit was conducted with patient's (and/or legal guardian's) consent. The visit was conducted pursuant to the emergency declaration under the [address] [place] [address] and the [employer] and [employer] General Act. Patient identification was verified, and a caregiver was present when appropriate. The patient was located in a state where the provider was licensed to provide care. 17 minute individual virtual session through [employer]. as the pt was a few minutes late logging on. The pt was pleasant and attentive and engaged in conversational speech. Pt reports that she is using the rehearsing strategy at home. Pt recalled 4 words by category exclusion with 50% accuracy. She recalled 3 words by category exclusion with 70% accuracy. Ideas for carryover to home were provided. Continue POC.     Syd Erazo M.A., [address] [facility]  Speech Pathologist  12/13/2022    03455  therapeutic interventions that focus on cognitive function , initial  15 min

## 2022-12-27 ENCOUNTER — HOSPITAL ENCOUNTER (OUTPATIENT)
Dept: SPEECH THERAPY | Age: 42
Setting detail: THERAPIES SERIES
Discharge: HOME | End: 2022-12-27
Payer: COMMERCIAL

## 2022-12-27 PROCEDURE — 97129 THER IVNTJ 1ST 15 MIN: CPT

## 2022-12-27 PROCEDURE — 97130 THER IVNTJ EA ADDL 15 MIN: CPT

## 2022-12-27 NOTE — PROGRESS NOTES
aLy Mcadams evaluated through a synchronous (real-time) audio-video encounter. The patient (or guardian if applicable) is aware that this is a billable service, which includes applicable co-pays. This virtual visit was conducted with patient's (and/or legal guardian's) consent. The visit was conducted pursuant to the emergency declaration under the [address] [place] [address] and the [employer] and [employer] General Act. Patient identification was verified, and a caregiver was present when appropriate. The patient was located in a state where the provider was licensed to provide care. 30 minute individual virtual session through [employer]. The pt was pleasant and attentive and engaged in conversational speech. Pt reports that she is using the rehearsing strategy at home. Pt recalled 3 words by category exclusion with 83% accuracy. She recalled 3 words by placement with >90% accuracy. Ideas for carryover to home were provided. Continue POC.     Ck Nelson M.A.[facility]  Speech Pathologist  12/27/2022    28909  therapeutic interventions that focus on cognitive function , initial  15 min  58750  therapeutic interventions that focus on cognitive function, each additional 15 min

## 2023-01-03 ENCOUNTER — HOSPITAL ENCOUNTER (OUTPATIENT)
Dept: SPEECH THERAPY | Age: 43
Setting detail: THERAPIES SERIES
Discharge: HOME | End: 2023-01-03
Payer: COMMERCIAL

## 2023-01-03 PROCEDURE — 97130 THER IVNTJ EA ADDL 15 MIN: CPT

## 2023-01-03 PROCEDURE — 97129 THER IVNTJ 1ST 15 MIN: CPT

## 2023-01-03 NOTE — PROGRESS NOTES
Lay Mcadams evaluated through a synchronous (real-time) audio-video encounter. The patient (or guardian if applicable) is aware that this is a billable service, which includes applicable co-pays. This virtual visit was conducted with patient's (and/or legal guardian's) consent. The visit was conducted pursuant to the emergency declaration under the [address] [place] [address] authority and the [employer] and [employer] General Act. Patient identification was verified, and a caregiver was present when appropriate. The patient was located in a state where the provider was licensed to provide care. 23 minute individual virtual session through [employer]. as the pt was a few minutes late logging on. The pt was pleasant and attentive and engaged in conversational speech. Pt reports that she is using the rehearsing strategy at home. The pt summarized key details from moderately complex paragraphs read by the SLP x 1 with 90% accuracy. Ideas for carryover to home were provided. Continue POC.     Daysi Cm M.A. [facility]  Speech Pathologist  1/3/2023    85492  therapeutic interventions that focus on cognitive function , initial  15 min  77757  therapeutic interventions that focus on cognitive function, each additional 15 min

## 2023-01-04 NOTE — PROGRESS NOTES
Ganesh Rapp was treated through a synchronous (real-time) audio-video encounter. The patient (or guardian if applicable) is aware that this is a billable service, which includes applicable co-pays. This virtual visit was conducted with patient's (and/or legal guardian's) consent. The visit was conducted pursuant to the emergency declaration under the [facility], [address] [place] [address] authority and the [employer] and [employer] General Act. Patient identification was verified, and a caregiver was present when appropriate. The patient was located in a state where the provider was licensed to provide care. This visit was completed virtually using doxy as we could not connect to [employer]. 30 minute individual session. The pt was attentive and engaged easily in conversational speech. She answered auditory processing questions given a description and given a rhyming word e.g. \"you use it with a hammer and it rhymes with \"pail\" with 40% accuracy. Accuracy improved given repetition. Pt answered other auditory processing questions related to time with 86% accuracy. Pt continues to make progress in therapy. Continue POC.     Ronny Magdaleno M.A.  Speech Pathologist  7/19/2022    67467  therapeutic interventions that focus on cognitive function , initial  15 min  24907  therapeutic interventions that focus on cognitive function, each additional 15 min .

## 2023-01-10 ENCOUNTER — HOSPITAL ENCOUNTER (OUTPATIENT)
Dept: SPEECH THERAPY | Age: 43
Setting detail: THERAPIES SERIES
Discharge: HOME | End: 2023-01-10
Payer: COMMERCIAL

## 2023-01-10 NOTE — PROGRESS NOTES
Lay Mcadams evaluated through a synchronous (real-time) audio-video encounter. The patient (or guardian if applicable) is aware that this is a billable service, which includes applicable co-pays. This virtual visit was conducted with patient's (and/or legal guardian's) consent. The visit was conducted pursuant to the emergency declaration under the [address] [place] [address] and the [employer] and [employer] General Act. Patient identification was verified, and a caregiver was present when appropriate. The patient was located in a state where the provider was licensed to provide care. 23 minute individual virtual session through [employer]. as the pt was a few minutes late logging on. The pt was pleasant and attentive and engaged in conversational speech. Pt reports that she is using the rehearsing strategy at home. The pt summarized key details from moderately complex paragraphs read by the SLP x 1 with 90% accuracy. Ideas for carryover to home were provided. Continue POC.     Evangelista Davidson [facility].A. [facility]  Speech Pathologist  1/10/2023    76315  therapeutic interventions that focus on cognitive function , initial  15 min  28418  therapeutic interventions that focus on cognitive function, each additional 15 min

## 2023-01-13 ENCOUNTER — HOSPITAL ENCOUNTER (OUTPATIENT)
Dept: SPEECH THERAPY | Age: 43
Setting detail: THERAPIES SERIES
Discharge: HOME | End: 2023-01-13
Payer: COMMERCIAL

## 2023-01-13 PROCEDURE — 97129 THER IVNTJ 1ST 15 MIN: CPT

## 2023-01-13 NOTE — PROGRESS NOTES
Lay Mcadams evaluated through a synchronous (real-time) audio-video encounter. The patient (or guardian if applicable) is aware that this is a billable service, which includes applicable co-pays. This virtual visit was conducted with patient's (and/or legal guardian's) consent. The visit was conducted pursuant to the emergency declaration under the [address] [place] [address] and the [place] [employer] and [employer] General Act. Patient identification was verified, and a caregiver was present when appropriate. The patient was located in a state where the provider was licensed to provide care. 15 minute individual virtual session through [employer]. as the pt was a few minutes late logging on. The pt was pleasant and attentive and engaged in conversational speech. Pt reports that she is using the rehearsing strategy at home. The pt recalled key details from moderately complex paragraphs read by the SLP x 1 with 68% accuracy. Ideas for carryover to home were provided. Continue POC.     King Quintero M.A., [facility]  Speech Pathologist  1/13/2023    86912  therapeutic interventions that focus on cognitive function , initial  15 min

## 2023-01-17 ENCOUNTER — HOSPITAL ENCOUNTER (OUTPATIENT)
Dept: SPEECH THERAPY | Age: 43
Setting detail: THERAPIES SERIES
End: 2023-01-17
Payer: COMMERCIAL

## 2023-01-19 ENCOUNTER — HOSPITAL ENCOUNTER (OUTPATIENT)
Dept: SPEECH THERAPY | Age: 43
Setting detail: THERAPIES SERIES
Discharge: HOME | End: 2023-01-19
Payer: COMMERCIAL

## 2023-01-19 PROCEDURE — 97129 THER IVNTJ 1ST 15 MIN: CPT

## 2023-01-19 PROCEDURE — 97130 THER IVNTJ EA ADDL 15 MIN: CPT

## 2023-01-19 NOTE — PROGRESS NOTES
Lay Mcadams evaluated through a synchronous (real-time) audio-video encounter. The patient (or guardian if applicable) is aware that this is a billable service, which includes applicable co-pays. This virtual visit was conducted with patient's (and/or legal guardian's) consent. The visit was conducted pursuant to the emergency declaration under the [address] [place] [address] and the [employer] and [employer] General Act. Patient identification was verified, and a caregiver was present when appropriate. The patient was located in a state where the provider was licensed to provide care. 18 minute individual virtual session through [employer]. as the pt was a few minutes late logging on. The pt was pleasant and attentive and engaged in conversational speech. The pt recalled key details from moderately complex paragraphs read by the SLP x 1 with 70% accuracy. The pt named items in categories with mild cues to target organization and naming. Ideas for carryover to home were provided. Continue POC.     Denice Besnon M.A., [id number] [place] [address]  Speech Pathologist  1/19/2023     CPT CODE:       99417  therapeutic interventions that focus on cognitive function , initial  15 min

## 2023-01-24 ENCOUNTER — HOSPITAL ENCOUNTER (OUTPATIENT)
Dept: SPEECH THERAPY | Age: 43
Setting detail: THERAPIES SERIES
Discharge: HOME | End: 2023-01-24
Payer: COMMERCIAL

## 2023-01-24 NOTE — PROGRESS NOTES
The SLP called the pt as she was not logged on for her virtual visit. Pt reported that she was watching a movie and would need to reschedule. Pt will call back to reschedule. Continue POC.     Saul Sosa M.A., [facility]  Speech Pathologist  1/24/2023

## 2023-01-26 ENCOUNTER — HOSPITAL ENCOUNTER (OUTPATIENT)
Dept: SPEECH THERAPY | Age: 43
Setting detail: THERAPIES SERIES
Discharge: HOME | End: 2023-01-26
Payer: COMMERCIAL

## 2023-01-26 PROCEDURE — 97129 THER IVNTJ 1ST 15 MIN: CPT

## 2023-01-26 PROCEDURE — 97130 THER IVNTJ EA ADDL 15 MIN: CPT

## 2023-01-26 NOTE — PROGRESS NOTES
Lay Mcadams evaluated through a synchronous (real-time) audio-video encounter. The patient (or guardian if applicable) is aware that this is a billable service, which includes applicable co-pays. This virtual visit was conducted with patient's (and/or legal guardian's) consent. The visit was conducted pursuant to the emergency declaration under the [address] [place] [address] and the [employer] and [employer] General Act. Patient identification was verified, and a caregiver was present when appropriate. The patient was located in a state where the provider was licensed to provide care. 30 minute individual virtual session through [employer]. The pt was pleasant and attentive and engaged in conversational speech. The pt recalled key details from moderately complex paragraphs read by the SLP x 1 with 60% accuracy. She recalled 4 words by grouping into 2 categories with 60% accuracy. Pt used rehearsing and associations given mild verbal cues. Ideas for carryover to home were provided. Continue POC.     Trina Morris M.A., [id number] [place] [address]  Speech Pathologist  1/26/2023    CPT CODE:       16280  therapeutic interventions that focus on cognitive function , initial  15 min  66032  therapeutic interventions that focus on cognitive function, each additional 15 min

## 2023-01-31 ENCOUNTER — HOSPITAL ENCOUNTER (OUTPATIENT)
Dept: SPEECH THERAPY | Age: 43
Setting detail: THERAPIES SERIES
Discharge: HOME | End: 2023-01-31
Payer: COMMERCIAL

## 2023-01-31 PROCEDURE — 97130 THER IVNTJ EA ADDL 15 MIN: CPT

## 2023-01-31 PROCEDURE — 97129 THER IVNTJ 1ST 15 MIN: CPT

## 2023-01-31 NOTE — PROGRESS NOTES
Lay Mcadams evaluated through a synchronous (real-time) audio-video encounter. The patient (or guardian if applicable) is aware that this is a billable service, which includes applicable co-pays. This virtual visit was conducted with patient's (and/or legal guardian's) consent. The visit was conducted pursuant to the emergency declaration under the [address] [place] [address] and the [place] [employer] and [employer] General Act. Patient identification was verified, and a caregiver was present when appropriate. The patient was located in a state where the provider was licensed to provide care. 30 minute individual virtual session through [facility]. The pt was pleasant and attentive and engaged in conversational speech. She recalled 4 words by grouping into 2 categories with 70% accuracy. Pt used rehearsing and grouping strategies given mild verbal cues. The recalled details from complex sentences with 75% accuracy if sentences were read x 2 by the SLP. Ideas for carryover to home were provided. Continue POC.     Natalia Sam M.A.  Speech Pathologist  1/31/2023    CPT CODE:       60901  therapeutic interventions that focus on cognitive function , initial  15 min  74301  therapeutic interventions that focus on cognitive function, each additional 15 min

## 2023-02-07 ENCOUNTER — HOSPITAL ENCOUNTER (OUTPATIENT)
Dept: SPEECH THERAPY | Age: 43
Setting detail: THERAPIES SERIES
Discharge: HOME | End: 2023-02-07
Payer: COMMERCIAL

## 2023-02-07 PROCEDURE — 97129 THER IVNTJ 1ST 15 MIN: CPT

## 2023-02-07 NOTE — PROGRESS NOTES
Lay Mcadams evaluated through a synchronous (real-time) audio-video encounter. The patient (or guardian if applicable) is aware that this is a billable service, which includes applicable co-pays. This virtual visit was conducted with patient's (and/or legal guardian's) consent. The visit was conducted pursuant to the emergency declaration under the [address] [place] [address] and the [place] [employer] and [employer] General Act. Patient identification was verified, and a caregiver was present when appropriate. The patient was located in a state where the provider was licensed to provide care. 17 minute individual virtual session through [employer]. Pt logged on a few minutes late. The pt was pleasant and attentive and engaged in conversational speech. She recalled 4 words by grouping into 2 categories with 75% accuracy. Pt used rehearsing and grouping strategies given mild verbal cues. The recalled details from moderately complex paragraphs with 73% accuracy read x 1 by the SLP. Ideas for carryover to home were provided. Continue POC.     Gabriela Goyal M.A., [id number] [place] [address]  Speech Pathologist  2/7/2023    CPT CODE:       67755  therapeutic interventions that focus on cognitive function , initial  15 min

## 2023-02-14 ENCOUNTER — HOSPITAL ENCOUNTER (OUTPATIENT)
Dept: SPEECH THERAPY | Age: 43
Setting detail: THERAPIES SERIES
Discharge: HOME | End: 2023-02-14
Payer: COMMERCIAL

## 2023-02-14 PROCEDURE — 97129 THER IVNTJ 1ST 15 MIN: CPT

## 2023-02-14 PROCEDURE — 97130 THER IVNTJ EA ADDL 15 MIN: CPT

## 2023-02-14 NOTE — PROGRESS NOTES
Lay Mcadams evaluated through a synchronous (real-time) audio-video encounter. The patient (or guardian if applicable) is aware that this is a billable service, which includes applicable co-pays. This virtual visit was conducted with patient's (and/or legal guardian's) consent. The visit was conducted pursuant to the emergency declaration under the [address] [place] [address] and the [employer] and [employer] General Act. Patient identification was verified, and a caregiver was present when appropriate. The patient was located in a state where the provider was licensed to provide care. 25 minute individual virtual session through [employer]. Pt logged on a few minutes late. The pt was pleasant and attentive and engaged in conversational speech. She recalled 5 words using rehearsing and visualizing strategies with 84% accuracy. Pt used linking strategies using simple stories to recall words given mild verbal cues. The recalled details from moderately complex paragraphs with 75% accuracy read x 1 by the SLP. Ideas for carryover to home were provided. Continue POC.     Saul Sosa M.A., [address] [facility]  Speech Pathologist  2/14/2023    41555  therapeutic interventions that focus on cognitive function , initial  15 min  68972  therapeutic interventions that focus on cognitive function, each additional 15 min

## 2023-02-21 ENCOUNTER — HOSPITAL ENCOUNTER (OUTPATIENT)
Dept: SPEECH THERAPY | Age: 43
Setting detail: THERAPIES SERIES
Discharge: HOME | End: 2023-02-21
Payer: COMMERCIAL

## 2023-02-21 PROCEDURE — 97129 THER IVNTJ 1ST 15 MIN: CPT

## 2023-02-21 PROCEDURE — 97130 THER IVNTJ EA ADDL 15 MIN: CPT

## 2023-02-21 NOTE — PROGRESS NOTES
Lay Mcadams evaluated through a synchronous (real-time) audio-video encounter.  The patient (or guardian if applicable) is aware that this is a billable service, which includes applicable co-pays.  This virtual visit was conducted with patient's (and/or legal guardian's) consent.  The visit was conducted pursuant to the emergency declaration under the [employer] Act and the National Emergencies Act, 1135 waiver authority and the Coronavirus Preparedness and Response Supplemental Appropriations Act.  Patient identification was verified, and a caregiver was present when appropriate.  The patient was located in a state where the provider was licensed to provide care.     25 minute individual virtual session through [employer]. Pt logged on a few minutes late. The pt was pleasant and attentive and engaged in conversational speech. The SLP started the pt's cognitive re-evaluation via the RIPA-2.  Will continue testing next week.      Yamilet Ortiz M.A., CCC-SLP  Speech Pathologist  2/21/2023    03001  therapeutic interventions that focus on cognitive function , initial  15 min  97850  therapeutic interventions that focus on cognitive function, each additional 15 min

## 2023-02-28 ENCOUNTER — HOSPITAL ENCOUNTER (OUTPATIENT)
Dept: SPEECH THERAPY | Age: 43
Setting detail: THERAPIES SERIES
Discharge: HOME | End: 2023-02-28
Payer: COMMERCIAL

## 2023-02-28 PROCEDURE — 97129 THER IVNTJ 1ST 15 MIN: CPT

## 2023-02-28 PROCEDURE — 97130 THER IVNTJ EA ADDL 15 MIN: CPT

## 2023-02-28 NOTE — PROGRESS NOTES
Lay Mcadams evaluated through a synchronous (real-time) audio-video encounter. The patient (or guardian if applicable) is aware that this is a billable service, which includes applicable co-pays. This virtual visit was conducted with patient's (and/or legal guardian's) consent. The visit was conducted pursuant to the emergency declaration under the [address] [place] [address] and the [employer] and [employer] General Act. Patient identification was verified, and a caregiver was present when appropriate. The patient was located in a state where the provider was licensed to provide care. 23 minute individual virtual session through [employer]. Pt logged on a few minutes late and was on the phone with a pharmacy. The pt was pleasant and attentive and engaged in conversational speech. The SLP continued the pt's cognitive re-evaluation via the RIPA-2. Continue POC.     Dat Olivier M.A.  Speech Pathologist  2/28/2023    83091  therapeutic interventions that focus on cognitive function , initial  15 min  45473  therapeutic interventions that focus on cognitive function, each additional 15 min

## 2023-03-07 ENCOUNTER — HOSPITAL ENCOUNTER (OUTPATIENT)
Dept: SPEECH THERAPY | Age: 43
Setting detail: THERAPIES SERIES
Discharge: HOME | End: 2023-03-07
Payer: COMMERCIAL

## 2023-03-07 PROCEDURE — 97130 THER IVNTJ EA ADDL 15 MIN: CPT

## 2023-03-07 PROCEDURE — 97129 THER IVNTJ 1ST 15 MIN: CPT

## 2023-03-07 NOTE — PROGRESS NOTES
Lay Mcadams evaluated through a synchronous (real-time) audio-video encounter. The patient (or guardian if applicable) is aware that this is a billable service, which includes applicable co-pays. This virtual visit was conducted with patient's (and/or legal guardian's) consent. The visit was conducted pursuant to the emergency declaration under the [address] [place] [address] and the [employer] and [employer] General Act. Patient identification was verified, and a caregiver was present when appropriate. The patient was located in a state where the provider was licensed to provide care. 23 minute individual virtual session through [employer]. Pt logged on a few minutes late and was having difficulty connecting to her phone and then switched to the computer. The pt was pleasant and attentive and engaged in conversational speech. The SLP continued and completed the pt's cognitive re-evaluation via the RIPA-2. Discussed results with the pt. Full report with discharge summary to follow.      Nayeli Gavin M.A., [facility]  Speech Pathologist  3/7/2023    03858  therapeutic interventions that focus on cognitive function , initial  15 min  24202  therapeutic interventions that focus on cognitive function, each additional 15 min

## 2023-03-08 NOTE — PROGRESS NOTES
[address] [facility]  Outpatient Speech Therapy  Phone: [phone] Fax: [phone]       SPEECH/LANGUAGE PATHOLOGY  SPEECH/LANGUAGE/COGNITIVE RE-EVALUATION  AND DISCHARGE SUMMARY         PATIENT NAME:  Pako Pena  (female)     MRN:  [id number]    :  1980  (44 y.o.)  STATUS:  Outpatient    TODAY'S DATE:  3/8/2023  REFERRING PROVIDER:    Dr. Ry Rodriges; pt now seen by Vir[facility] as Dr. Romana Urena retired  [address]: SLP cognitive language evaluation  Date of order:  2021  REASON FOR REFERRAL: cognitive communication deficit R41.841; pt is also diagnosed with MS    REFERRING DIAGNOSIS: Cognitive communication deficit [R41.841]    The pt was seen for a cognitive re-evaluation on 2023 and 3/7/2023. Her initial evaluation was completed on 2021. The pt has been seen for virtual speech therapy once/week through [facility]. Attendance in therapy has been consistent. The patient was previously seen for cognitive therapy from 2020 until 10/5/2021. SPEECH THERAPY  PLAN OF CARE   The speech therapy  POC is established based on physician order, speech pathology diagnosis and results of clinical assessment    SPEECH PATHOLOGY DIAGNOSIS:    Moderate-marked cognitive deficit    Stimulus questions were obtained from the RIPA-2.  Severity ratings for each subtest were determined as follows:         Subtest Raw Score Percentile Standard Score Severity          Immediate Memory   16 16 7 severe   Recent Memory   30 95 15 Mild-WFL   Temporal Orientation  (Recent Memory) 30 91 14 Mild-WFL   Temporal Orientation  (Remote Memory) 27 63 11 moderate   Spatial Orientation   22 25 8 severe   Orientation to Environment Not tested- for hospital ADL's N/A N/A N/A   Recall of General Information Not tested N/A N/A N/A   Problem Solving & Abstract Reasoning 20 37 9 Marked   Organization   23 63 11 moderate   Auditory Processing & Retention 22 25 8 severe       VARIANT OBSERVATIONS     Present Absent   Error (e) x    Perseveration (p) x    Repeat Instructions/Stimulus (r) x    Denial/Refusal (d)  x   Delayed Response (dl) x    Confabulation (c)  x   Partially Correct (pc) x    Irrelevant (i)  x   Tangential (t) x    Self-corrected (sc) x      Comments: The patient has demonstrated scores which either remained the same or were lower when compared with previous testing. The pt has demonstrated ability to implement rehearsing, visualizing and grouping strategies during therapy sessions given mild-moderate verbal cues. This plan may be re-evaluated and revised as warranted. Treatment goals discussed with Patient   The Patient understand(s) the diagnosis, prognosis and plan of care     RECOMMENDATIONS:   The pt is discharged from outpatient speech therapy at this time. Pt's re-evaluation results did not demonstrate improvements in cognition since previous testing was completed. This was discussed and reviewed with the pt. If additional speech therapy is recommended, a new script would be needed. If you have any questions or concerns, please don't hesitate to call.   Thank you for your referral.    Nayeli Gavin M.A., [facility]-SLP  Speech Pathologist  3/8/2023     [place] [address].     Phone: [phone]

## 2023-03-21 ENCOUNTER — APPOINTMENT (OUTPATIENT)
Dept: SPEECH THERAPY | Age: 43
End: 2023-03-21
Payer: COMMERCIAL

## 2023-03-28 ENCOUNTER — APPOINTMENT (OUTPATIENT)
Dept: SPEECH THERAPY | Age: 43
End: 2023-03-28
Payer: COMMERCIAL

## 2023-05-01 RX ORDER — DIMETHYL FUMARATE 240 MG/1
CAPSULE ORAL
Qty: 60 CAPSULE | OUTPATIENT
Start: 2023-05-01

## 2023-08-29 ENCOUNTER — NURSE TRIAGE (OUTPATIENT)
Dept: OTHER | Facility: CLINIC | Age: 43
End: 2023-08-29

## 2023-08-29 NOTE — TELEPHONE ENCOUNTER
Location of patient: [place]    Received call from Nolan Felton at [facility]; Patient with Red Flag Complaint     Subjective: Caller states \"Having headaches over the weekend, throbbing, she has MS and has not seen her MD for years, and is off meds, having night sweats\"     Current Symptoms: Moderate headache. Onset: 3 days ago; gradual, worsening    Associated Symptoms: NA    Pain Severity: 5/10; aching; constant    Temperature: Denies     What has been tried: Tylenol    Recommended disposition: See in Office Today or Tomorrow    Care advice provided, patient verbalizes understanding; denies any other questions or concerns; instructed to call back for any new or worsening symptoms. Patient/Caller agrees with recommended disposition; writer provided warm transfer to [facility] [employer] for appointment scheduling    Attention Provider: Thank you for allowing me to participate in the care of your patient. The patient was connected to triage in response to information provided to the ECC. Please do not respond through this encounter as the response is not directed to a shared pool.   Reason for Disposition   Unexplained headache that is present > 24 hours    Protocols used: Headache-ADULT-[place]

## 2023-09-01 ENCOUNTER — TELEPHONE (OUTPATIENT)
Dept: PHYSICAL MEDICINE AND REHAB | Age: 43
End: 2023-09-01

## 2023-09-01 NOTE — TELEPHONE ENCOUNTER
Patient called for wanting an appointment as soon as possible. She is complaining of a headache that has been bothering her for a couple days. I suggested that she go to ED or her PCP. She was seen by Neuro in the past and I also suggested that she make an appointment with them for a follow up. But I suggested several times not to let her symptoms go untreated. She said she was going to go to Urgent care this weekend. Her last office visit with this office was 3 years ago.  She is scheduled 12/5/23     Krzysztof Mills MA

## 2023-09-19 ENCOUNTER — TELEPHONE (OUTPATIENT)
Dept: PHYSICAL MEDICINE AND REHAB | Age: 43
End: 2023-09-19

## 2023-09-19 NOTE — TELEPHONE ENCOUNTER
Patient mother called in august and 9/18/23. She left nasty messages including foul language and screaming that Dr Lisa Ken will not refill medication for MS. I spoke with the patient previously and made an appointment for rehab related issues. September 19th I spoke with both the mother and patient and explained that an appointment was already scheduled for 12/5/23. The mother wants the 'blue pill\" I asked which provider prescribed this, Temo Martinez stated the doctor that ran away from everybody\"  I suggested that she contact Neuro dept to get her medication.

## 2024-06-26 ENCOUNTER — TELEPHONE (OUTPATIENT)
Dept: PRIMARY CARE CLINIC | Age: 44
End: 2024-06-26

## 2024-06-26 ENCOUNTER — OFFICE VISIT (OUTPATIENT)
Dept: PRIMARY CARE CLINIC | Age: 44
End: 2024-06-26
Payer: COMMERCIAL

## 2024-06-26 VITALS
HEIGHT: 67 IN | SYSTOLIC BLOOD PRESSURE: 120 MMHG | HEART RATE: 62 BPM | TEMPERATURE: 98 F | BODY MASS INDEX: 24.17 KG/M2 | WEIGHT: 154 LBS | DIASTOLIC BLOOD PRESSURE: 70 MMHG | OXYGEN SATURATION: 97 % | RESPIRATION RATE: 16 BRPM

## 2024-06-26 DIAGNOSIS — G43.E09 CHRONIC MIGRAINE WITH AURA WITHOUT STATUS MIGRAINOSUS, NOT INTRACTABLE: ICD-10-CM

## 2024-06-26 DIAGNOSIS — Z12.31 ENCOUNTER FOR SCREENING MAMMOGRAM FOR MALIGNANT NEOPLASM OF BREAST: ICD-10-CM

## 2024-06-26 DIAGNOSIS — G35 MULTIPLE SCLEROSIS (HCC): Primary | ICD-10-CM

## 2024-06-26 LAB
ALBUMIN: 4.5 G/DL (ref 3.5–5.2)
ALP BLD-CCNC: 52 U/L (ref 35–104)
ALT SERPL-CCNC: <5 U/L (ref 0–32)
ANION GAP SERPL CALCULATED.3IONS-SCNC: 14 MMOL/L (ref 7–16)
AST SERPL-CCNC: 16 U/L (ref 0–31)
BASOPHILS ABSOLUTE: 0.07 K/UL (ref 0–0.2)
BASOPHILS RELATIVE PERCENT: 1 % (ref 0–2)
BILIRUB SERPL-MCNC: 0.3 MG/DL (ref 0–1.2)
BUN BLDV-MCNC: 14 MG/DL (ref 6–20)
CALCIUM SERPL-MCNC: 9.1 MG/DL (ref 8.6–10.2)
CHLORIDE BLD-SCNC: 102 MMOL/L (ref 98–107)
CHOLESTEROL, TOTAL: 211 MG/DL
CO2: 21 MMOL/L (ref 22–29)
CREAT SERPL-MCNC: 0.7 MG/DL (ref 0.5–1)
EOSINOPHILS ABSOLUTE: 0.12 K/UL (ref 0.05–0.5)
EOSINOPHILS RELATIVE PERCENT: 2 % (ref 0–6)
GFR, ESTIMATED: >90 ML/MIN/1.73M2
GLUCOSE BLD-MCNC: 77 MG/DL (ref 74–99)
HCT VFR BLD CALC: 43.4 % (ref 34–48)
HDLC SERPL-MCNC: 97 MG/DL
HEMOGLOBIN: 13.6 G/DL (ref 11.5–15.5)
IMMATURE GRANULOCYTES %: 0 % (ref 0–5)
IMMATURE GRANULOCYTES ABSOLUTE: <0.03 K/UL (ref 0–0.58)
LDL CHOLESTEROL: 105 MG/DL
LYMPHOCYTES ABSOLUTE: 1.35 K/UL (ref 1.5–4)
LYMPHOCYTES RELATIVE PERCENT: 18 % (ref 20–42)
MCH RBC QN AUTO: 31.8 PG (ref 26–35)
MCHC RBC AUTO-ENTMCNC: 31.3 G/DL (ref 32–34.5)
MCV RBC AUTO: 101.4 FL (ref 80–99.9)
MONOCYTES ABSOLUTE: 0.59 K/UL (ref 0.1–0.95)
MONOCYTES RELATIVE PERCENT: 8 % (ref 2–12)
NEUTROPHILS ABSOLUTE: 5.56 K/UL (ref 1.8–7.3)
NEUTROPHILS RELATIVE PERCENT: 72 % (ref 43–80)
PDW BLD-RTO: 13.2 % (ref 11.5–15)
PLATELET # BLD: 462 K/UL (ref 130–450)
PMV BLD AUTO: 9.1 FL (ref 7–12)
POTASSIUM SERPL-SCNC: 4.4 MMOL/L (ref 3.5–5)
RBC # BLD: 4.28 M/UL (ref 3.5–5.5)
SODIUM BLD-SCNC: 137 MMOL/L (ref 132–146)
TOTAL PROTEIN: 7.6 G/DL (ref 6.4–8.3)
TRIGL SERPL-MCNC: 43 MG/DL
TSH SERPL DL<=0.05 MIU/L-ACNC: 0.56 UIU/ML (ref 0.27–4.2)
VLDLC SERPL CALC-MCNC: 9 MG/DL
WBC # BLD: 7.7 K/UL (ref 4.5–11.5)

## 2024-06-26 PROCEDURE — G8420 CALC BMI NORM PARAMETERS: HCPCS | Performed by: INTERNAL MEDICINE

## 2024-06-26 PROCEDURE — G8427 DOCREV CUR MEDS BY ELIG CLIN: HCPCS | Performed by: INTERNAL MEDICINE

## 2024-06-26 PROCEDURE — 36415 COLL VENOUS BLD VENIPUNCTURE: CPT | Performed by: INTERNAL MEDICINE

## 2024-06-26 PROCEDURE — 99204 OFFICE O/P NEW MOD 45 MIN: CPT | Performed by: INTERNAL MEDICINE

## 2024-06-26 PROCEDURE — 1036F TOBACCO NON-USER: CPT | Performed by: INTERNAL MEDICINE

## 2024-06-26 RX ORDER — TOPIRAMATE 50 MG/1
50 TABLET, FILM COATED ORAL 2 TIMES DAILY
Qty: 60 TABLET | Refills: 0 | Status: SHIPPED
Start: 2024-06-26 | End: 2024-06-26 | Stop reason: SDUPTHER

## 2024-06-26 SDOH — ECONOMIC STABILITY: HOUSING INSECURITY
IN THE LAST 12 MONTHS, WAS THERE A TIME WHEN YOU DID NOT HAVE A STEADY PLACE TO SLEEP OR SLEPT IN A SHELTER (INCLUDING NOW)?: NO

## 2024-06-26 SDOH — ECONOMIC STABILITY: FOOD INSECURITY: WITHIN THE PAST 12 MONTHS, THE FOOD YOU BOUGHT JUST DIDN'T LAST AND YOU DIDN'T HAVE MONEY TO GET MORE.: NEVER TRUE

## 2024-06-26 SDOH — ECONOMIC STABILITY: INCOME INSECURITY: HOW HARD IS IT FOR YOU TO PAY FOR THE VERY BASICS LIKE FOOD, HOUSING, MEDICAL CARE, AND HEATING?: NOT HARD AT ALL

## 2024-06-26 SDOH — ECONOMIC STABILITY: FOOD INSECURITY: WITHIN THE PAST 12 MONTHS, YOU WORRIED THAT YOUR FOOD WOULD RUN OUT BEFORE YOU GOT MONEY TO BUY MORE.: NEVER TRUE

## 2024-06-26 ASSESSMENT — ENCOUNTER SYMPTOMS
NAUSEA: 0
VOMITING: 0
COUGH: 0
ABDOMINAL PAIN: 0
DIARRHEA: 0
SHORTNESS OF BREATH: 0

## 2024-06-26 ASSESSMENT — PATIENT HEALTH QUESTIONNAIRE - PHQ9
5. POOR APPETITE OR OVEREATING: NOT AT ALL
4. FEELING TIRED OR HAVING LITTLE ENERGY: NOT AT ALL
SUM OF ALL RESPONSES TO PHQ QUESTIONS 1-9: 0
9. THOUGHTS THAT YOU WOULD BE BETTER OFF DEAD, OR OF HURTING YOURSELF: NOT AT ALL
1. LITTLE INTEREST OR PLEASURE IN DOING THINGS: NOT AT ALL
3. TROUBLE FALLING OR STAYING ASLEEP: NOT AT ALL
7. TROUBLE CONCENTRATING ON THINGS, SUCH AS READING THE NEWSPAPER OR WATCHING TELEVISION: NOT AT ALL
SUM OF ALL RESPONSES TO PHQ9 QUESTIONS 1 & 2: 0
8. MOVING OR SPEAKING SO SLOWLY THAT OTHER PEOPLE COULD HAVE NOTICED. OR THE OPPOSITE, BEING SO FIGETY OR RESTLESS THAT YOU HAVE BEEN MOVING AROUND A LOT MORE THAN USUAL: NOT AT ALL
10. IF YOU CHECKED OFF ANY PROBLEMS, HOW DIFFICULT HAVE THESE PROBLEMS MADE IT FOR YOU TO DO YOUR WORK, TAKE CARE OF THINGS AT HOME, OR GET ALONG WITH OTHER PEOPLE: NOT DIFFICULT AT ALL
SUM OF ALL RESPONSES TO PHQ QUESTIONS 1-9: 0
6. FEELING BAD ABOUT YOURSELF - OR THAT YOU ARE A FAILURE OR HAVE LET YOURSELF OR YOUR FAMILY DOWN: NOT AT ALL
2. FEELING DOWN, DEPRESSED OR HOPELESS: NOT AT ALL
SUM OF ALL RESPONSES TO PHQ QUESTIONS 1-9: 0
SUM OF ALL RESPONSES TO PHQ QUESTIONS 1-9: 0

## 2024-06-26 NOTE — TELEPHONE ENCOUNTER
Patient requesting the prescription for her  topiramate (TOPAMAX) 50 MG tablet be changed as it is going to cost $400 at [facility] in [place].

## 2024-06-26 NOTE — PROGRESS NOTES
SUBJECTIVE  Lay Pressley is a 44 y.o. female new  was seen In the office  for evaluation.    HPI/Chief C/O:  Chief Complaint   Patient presents with    New Patient     Patient is here for migraines    Having daily headaches  takes tylenol not much help   Has Hx of MS   No Known Allergies    ROS:  Review of Systems   Respiratory:  Negative for cough and shortness of breath.         Negative for Hemoptysis   Cardiovascular:  Negative for chest pain.   Gastrointestinal:  Negative for abdominal pain, diarrhea, nausea and vomiting.   Endocrine: Negative for polydipsia, polyphagia and polyuria.   Genitourinary:  Negative for dysuria and hematuria.   Skin:  Negative for rash.   Neurological:  Negative for tremors and seizures.        Past Medical/Surgical Hx;  Reviewed with patient      Diagnosis Date    Chronic back pain     Delayed growth and development     Heel spur     Right    Multiple sclerosis (HCC) 07/26/2022    STD (female)     unknown as per pt; at Dr Ochoa     Past Surgical History:   Procedure Laterality Date    FOOT SURGERY      bone spur, Dr Sotelo       Past Family Hx:  Reviewed with patient      Problem Relation Age of Onset    Diabetes Mother     Hypertension Mother     High Cholesterol Mother     Diabetes Maternal Grandmother     Diabetes Maternal Grandfather     Cancer Maternal Grandfather         lymphoma     Hypertension Brother     High Cholesterol Brother     Cancer Maternal Aunt         breast cancer diagnosed 39yo       Social Hx:  Reviewed with patient  Social History     Tobacco Use    Smoking status: Never    Smokeless tobacco: Never   Substance Use Topics    Alcohol use: No       OBJECTIVE  /70   Pulse 62   Temp 98 °F (36.7 °C)   Resp 16   Ht 1.702 m (5' 7\")   Wt 69.9 kg (154 lb)   LMP 06/18/2024   SpO2 97%   BMI 24.12 kg/m²     Problem List:  Lay does not have any pertinent problems on file.    PHYS EX:  Physical Exam  Eyes:      Extraocular Movements: Extraocular

## 2024-06-26 NOTE — TELEPHONE ENCOUNTER
Patient requesting her prescription for her  topiramate (TOPAMAX) 50 MG tablet be sent to [facility] in [place] [facility] and it would cost her $205 for her prescription. Thank you.

## 2024-06-28 RX ORDER — TOPIRAMATE 50 MG/1
50 TABLET, FILM COATED ORAL 2 TIMES DAILY
Qty: 60 TABLET | Refills: 0 | Status: SHIPPED | OUTPATIENT
Start: 2024-06-28

## 2024-07-10 ENCOUNTER — TELEPHONE (OUTPATIENT)
Dept: PRIMARY CARE CLINIC | Age: 44
End: 2024-07-10

## 2024-07-10 NOTE — TELEPHONE ENCOUNTER
----- Message from Alessandra Friedman sent at 7/10/2024 11:55 AM EDT -----  Regarding: ECC Referral Request  ECC Referral Request    Reason for referral request: Other     Specialist/Lab/Test patient is requesting (if known): Oj Oliveira Mounir - PCP    Specialist Phone Number (if applicable):    Additional Information: Patient request to have referral for MRI  --------------------------------------------------------------------------------------------------------------------------    Relationship to Patient: Self     Call Back Information: OK to respond with electronic message via [employer] portal (only for patients who have registered [employer] account)    Preferred Call Back Number: Phone [phone]

## 2024-08-06 RX ORDER — TOPIRAMATE 50 MG/1
50 TABLET, FILM COATED ORAL 2 TIMES DAILY
Qty: 180 TABLET | Refills: 0 | Status: SHIPPED | OUTPATIENT
Start: 2024-08-06

## 2024-08-13 ENCOUNTER — OFFICE VISIT (OUTPATIENT)
Dept: FAMILY MEDICINE CLINIC | Age: 44
End: 2024-08-13
Payer: COMMERCIAL

## 2024-08-13 VITALS
WEIGHT: 150 LBS | DIASTOLIC BLOOD PRESSURE: 61 MMHG | TEMPERATURE: 97.8 F | SYSTOLIC BLOOD PRESSURE: 92 MMHG | HEIGHT: 67 IN | OXYGEN SATURATION: 100 % | BODY MASS INDEX: 23.54 KG/M2 | HEART RATE: 67 BPM | RESPIRATION RATE: 16 BRPM

## 2024-08-13 DIAGNOSIS — G35 MULTIPLE SCLEROSIS (HCC): Primary | ICD-10-CM

## 2024-08-13 DIAGNOSIS — F32.1 CURRENT MODERATE EPISODE OF MAJOR DEPRESSIVE DISORDER WITHOUT PRIOR EPISODE (HCC): ICD-10-CM

## 2024-08-13 PROBLEM — T14.91XA SUICIDE ATTEMPT (HCC): Status: RESOLVED | Noted: 2022-07-27 | Resolved: 2024-08-13

## 2024-08-13 PROCEDURE — G8428 CUR MEDS NOT DOCUMENT: HCPCS

## 2024-08-13 PROCEDURE — 1036F TOBACCO NON-USER: CPT

## 2024-08-13 PROCEDURE — 99214 OFFICE O/P EST MOD 30 MIN: CPT

## 2024-08-13 PROCEDURE — G8420 CALC BMI NORM PARAMETERS: HCPCS

## 2024-08-13 RX ORDER — SERTRALINE HYDROCHLORIDE 25 MG/1
25 TABLET, FILM COATED ORAL DAILY
Qty: 30 TABLET | Refills: 0 | Status: SHIPPED | OUTPATIENT
Start: 2024-08-13

## 2024-08-13 NOTE — PROGRESS NOTES
Attending Physician Statement    S:   Chief Complaint   Patient presents with    New Patient    Fall     Frequent falls  not on her MS medications      History of relapsing remitting MS, depression     Has not seen a neurologist since 2022. Last MRI showed multiple lesions in 2022. She stopped her treatment in 2022. Falls a lot. Blurry vision and intermittent numbness.     Previously on zoloft.   O: Blood pressure 92/61, pulse 67, temperature 97.8 °F (36.6 °C), temperature source Temporal, resp. rate 16, height 1.702 m (5' 7\"), weight 68 kg (150 lb), last menstrual period 07/17/2024, SpO2 100%, not currently breastfeeding.   Exam:   Heart - RRR   Lungs - clear     A: MS , depression   P:  Neurology referral    Possible depression referral    Check MRI brain \res  Restart zoloft    Follow-up as ordered    Attending Attestation   I have discussed the case, including pertinent history and exam findings with the resident.  I also have personally seen and examined the patient.  I agree with the documented assessment and plan.         
back pain     Delayed growth and development     Heel spur     Right    Multiple sclerosis (HCC) 07/26/2022    STD (female)     unknown as per pt; at Dr Ochoa       Allergies:   No Known Allergies    Medications:    Current Outpatient Medications   Medication Sig Dispense Refill    vitamin D (CHOLECALCIFEROL) 25 MCG (1000 UT) TABS tablet Take 1 tablet by mouth daily 90 tablet 1    sertraline (ZOLOFT) 25 MG tablet Take 1 tablet by mouth daily 30 tablet 0     No current facility-administered medications for this visit.      ______________________________________________________________________    Physical Exam:    Blood pressure 92/61, pulse 67, temperature 97.8 °F (36.6 °C), temperature source Temporal, resp. rate 16, height 1.702 m (5' 7\"), weight 68 kg (150 lb), last menstrual period 07/17/2024, SpO2 100%, not currently breastfeeding.    Physical Exam  Constitutional:       Appearance: Normal appearance.   HENT:      Head: Normocephalic and atraumatic.      Right Ear: Tympanic membrane, ear canal and external ear normal.      Left Ear: Tympanic membrane, ear canal and external ear normal.      Nose: Nose normal.      Mouth/Throat:      Mouth: Mucous membranes are moist.      Pharynx: Oropharynx is clear.   Eyes:      Conjunctiva/sclera: Conjunctivae normal.      Pupils: Pupils are equal, round, and reactive to light.   Cardiovascular:      Rate and Rhythm: Normal rate and regular rhythm.      Pulses: Normal pulses.      Heart sounds: Normal heart sounds.   Pulmonary:      Effort: Pulmonary effort is normal.      Breath sounds: Normal breath sounds.   Abdominal:      General: Bowel sounds are normal.      Palpations: Abdomen is soft.      Tenderness: There is no abdominal tenderness.   Musculoskeletal:      Cervical back: Normal range of motion and neck supple.      Right lower leg: No edema.      Left lower leg: No edema.   Skin:     General: Skin is warm and dry.   Neurological:      General: No focal deficit

## 2024-08-21 ENCOUNTER — TELEPHONE (OUTPATIENT)
Dept: FAMILY MEDICINE CLINIC | Age: 44
End: 2024-08-21

## 2024-08-21 DIAGNOSIS — G35 MULTIPLE SCLEROSIS (HCC): Primary | ICD-10-CM

## 2024-08-21 NOTE — TELEPHONE ENCOUNTER
Pt called for new referral to neurologist on [place] - Dr Jani Rosenberg  [address] [facility]  [phone]    Previous neurologist office next available appt is to far states the pt.

## 2024-08-22 ENCOUNTER — TELEPHONE (OUTPATIENT)
Dept: FAMILY MEDICINE CLINIC | Age: 44
End: 2024-08-22

## 2024-08-22 NOTE — TELEPHONE ENCOUNTER
Patient call requesting that DR Noguera call her regarding her MS. Her number  is [phone].

## 2024-08-23 ENCOUNTER — TELEPHONE (OUTPATIENT)
Dept: FAMILY MEDICINE CLINIC | Age: 44
End: 2024-08-23

## 2024-08-23 DIAGNOSIS — G35 MULTIPLE SCLEROSIS (HCC): Primary | ICD-10-CM

## 2024-08-23 RX ORDER — DIMETHYL FUMARATE 120 MG/1
120 CAPSULE ORAL 2 TIMES DAILY
Qty: 14 CAPSULE | Refills: 0 | Status: SHIPPED | OUTPATIENT
Start: 2024-08-23 | End: 2024-08-30

## 2024-08-23 RX ORDER — DIMETHYL FUMARATE 240 MG/1
240 CAPSULE ORAL 2 TIMES DAILY
Qty: 60 CAPSULE | Refills: 0 | Status: SHIPPED | OUTPATIENT
Start: 2024-08-31 | End: 2024-09-30

## 2024-08-23 NOTE — TELEPHONE ENCOUNTER
Called and spoke with patient. Advised she keep appt w/ Dr. Tejeda 9/30/24. ED precautions advised, pt verbalized understanding. Pt also requested refill of her Ticfidera. Will reach out to neuro to determine if this is advisable.

## 2024-08-23 NOTE — TELEPHONE ENCOUNTER
Called and spoke with patient. Advised that after discussion w/ neurology, will start on Tecfidera at this time. Patient's recent CBC/CMP were WNL. Will recheck at f/u appt 9/16/24. Patient advised of adverse effects to monitor, as well as appropriate way to take medication. Patient verbalized understanding and is agreeable to plan

## 2024-08-27 ENCOUNTER — HOSPITAL ENCOUNTER (OUTPATIENT)
Dept: MRI IMAGING | Age: 44
Discharge: HOME | End: 2024-08-29
Payer: COMMERCIAL

## 2024-08-27 DIAGNOSIS — G35 MULTIPLE SCLEROSIS (HCC): ICD-10-CM

## 2024-08-27 PROCEDURE — A9579 GAD-BASE MR CONTRAST NOS,1ML: HCPCS | Performed by: RADIOLOGY

## 2024-08-27 PROCEDURE — 70553 MRI BRAIN STEM W/O & W/DYE: CPT

## 2024-08-27 PROCEDURE — 6360000004 HC RX CONTRAST MEDICATION: Performed by: RADIOLOGY

## 2024-08-27 RX ADMIN — GADOTERIDOL 15 ML: 279.3 INJECTION, SOLUTION INTRAVENOUS at 19:16

## 2024-08-30 ENCOUNTER — TELEPHONE (OUTPATIENT)
Dept: FAMILY MEDICINE CLINIC | Age: 44
End: 2024-08-30

## 2024-08-30 DIAGNOSIS — G35 MULTIPLE SCLEROSIS (HCC): Primary | ICD-10-CM

## 2024-08-30 NOTE — TELEPHONE ENCOUNTER
Lay called  in and she is asking if you could please call her back, she states that it is urgent.    Thank you

## 2024-08-30 NOTE — TELEPHONE ENCOUNTER
Called and informed patient of MRI results. She said she felt flushed on occasion and had one episode of diarrhea that has since resolved. Otherwise no new neurologic abnormalities. Patient advised  that should she experience eye pain, lightheadedness/dizziness, blurry vision, worsening weakness/numbness to go immediately to the emergency department. She is advised to continue taking her Tecfidera and come to her appt next month with me and keep her neuro appt next month.

## 2024-09-16 ENCOUNTER — OFFICE VISIT (OUTPATIENT)
Dept: FAMILY MEDICINE CLINIC | Age: 44
End: 2024-09-16
Payer: COMMERCIAL

## 2024-09-16 VITALS
WEIGHT: 151 LBS | DIASTOLIC BLOOD PRESSURE: 62 MMHG | HEIGHT: 67 IN | SYSTOLIC BLOOD PRESSURE: 113 MMHG | BODY MASS INDEX: 23.7 KG/M2 | RESPIRATION RATE: 16 BRPM

## 2024-09-16 DIAGNOSIS — F32.1 CURRENT MODERATE EPISODE OF MAJOR DEPRESSIVE DISORDER WITHOUT PRIOR EPISODE (HCC): ICD-10-CM

## 2024-09-16 DIAGNOSIS — Z51.81 MEDICATION MONITORING ENCOUNTER: Primary | ICD-10-CM

## 2024-09-16 DIAGNOSIS — G35 MULTIPLE SCLEROSIS (HCC): ICD-10-CM

## 2024-09-16 LAB
ALBUMIN: 4.3 G/DL (ref 3.5–5.2)
ALP BLD-CCNC: 63 U/L (ref 35–104)
ALT SERPL-CCNC: 16 U/L (ref 0–32)
ANION GAP SERPL CALCULATED.3IONS-SCNC: 13 MMOL/L (ref 7–16)
AST SERPL-CCNC: 21 U/L (ref 0–31)
BASOPHILS ABSOLUTE: 0.07 K/UL (ref 0–0.2)
BASOPHILS RELATIVE PERCENT: 1 % (ref 0–2)
BILIRUB SERPL-MCNC: 0.2 MG/DL (ref 0–1.2)
BUN BLDV-MCNC: 14 MG/DL (ref 6–20)
CALCIUM SERPL-MCNC: 9 MG/DL (ref 8.6–10.2)
CHLORIDE BLD-SCNC: 101 MMOL/L (ref 98–107)
CO2: 23 MMOL/L (ref 22–29)
CREAT SERPL-MCNC: 0.7 MG/DL (ref 0.5–1)
EOSINOPHILS ABSOLUTE: 0.23 K/UL (ref 0.05–0.5)
EOSINOPHILS RELATIVE PERCENT: 3 % (ref 0–6)
GFR, ESTIMATED: >90 ML/MIN/1.73M2
GLUCOSE BLD-MCNC: 80 MG/DL (ref 74–99)
HCT VFR BLD CALC: 40.9 % (ref 34–48)
HEMOGLOBIN: 13.2 G/DL (ref 11.5–15.5)
IMMATURE GRANULOCYTES %: 0 % (ref 0–5)
IMMATURE GRANULOCYTES ABSOLUTE: <0.03 K/UL (ref 0–0.58)
LYMPHOCYTES ABSOLUTE: 1.61 K/UL (ref 1.5–4)
LYMPHOCYTES RELATIVE PERCENT: 23 % (ref 20–42)
MCH RBC QN AUTO: 32.7 PG (ref 26–35)
MCHC RBC AUTO-ENTMCNC: 32.3 G/DL (ref 32–34.5)
MCV RBC AUTO: 101.2 FL (ref 80–99.9)
MONOCYTES ABSOLUTE: 0.68 K/UL (ref 0.1–0.95)
MONOCYTES RELATIVE PERCENT: 10 % (ref 2–12)
NEUTROPHILS ABSOLUTE: 4.35 K/UL (ref 1.8–7.3)
NEUTROPHILS RELATIVE PERCENT: 63 % (ref 43–80)
PDW BLD-RTO: 12.9 % (ref 11.5–15)
PLATELET # BLD: 485 K/UL (ref 130–450)
PMV BLD AUTO: 9.6 FL (ref 7–12)
POTASSIUM SERPL-SCNC: 4.3 MMOL/L (ref 3.5–5)
RBC # BLD: 4.04 M/UL (ref 3.5–5.5)
SODIUM BLD-SCNC: 137 MMOL/L (ref 132–146)
TOTAL PROTEIN: 7.3 G/DL (ref 6.4–8.3)
WBC # BLD: 7 K/UL (ref 4.5–11.5)

## 2024-09-16 PROCEDURE — 99213 OFFICE O/P EST LOW 20 MIN: CPT

## 2024-09-16 RX ORDER — DIMETHYL FUMARATE 240 MG/1
240 CAPSULE ORAL 2 TIMES DAILY
Qty: 60 CAPSULE | Refills: 0 | Status: SHIPPED | OUTPATIENT
Start: 2024-09-16 | End: 2024-10-16

## 2024-09-16 RX ORDER — SERTRALINE HYDROCHLORIDE 25 MG/1
25 TABLET, FILM COATED ORAL DAILY
Qty: 30 TABLET | Refills: 0 | Status: SHIPPED | OUTPATIENT
Start: 2024-09-16

## 2024-09-17 ENCOUNTER — TELEPHONE (OUTPATIENT)
Dept: FAMILY MEDICINE CLINIC | Age: 44
End: 2024-09-17

## 2024-09-17 DIAGNOSIS — D75.839 THROMBOCYTOSIS: Primary | ICD-10-CM

## 2024-09-17 DIAGNOSIS — D75.89 MACROCYTOSIS: ICD-10-CM

## 2024-09-26 ENCOUNTER — HOSPITAL ENCOUNTER (OUTPATIENT)
Age: 44
Discharge: HOME | End: 2024-09-26
Payer: COMMERCIAL

## 2024-09-26 DIAGNOSIS — D75.89 MACROCYTOSIS: ICD-10-CM

## 2024-09-26 DIAGNOSIS — D75.839 THROMBOCYTOSIS: ICD-10-CM

## 2024-09-26 LAB
FOLATE SERPL-MCNC: >20 NG/ML (ref 4.8–24.2)
VIT B12 SERPL-MCNC: 516 PG/ML (ref 211–946)

## 2024-09-26 PROCEDURE — 82607 VITAMIN B-12: CPT

## 2024-09-26 PROCEDURE — 82746 ASSAY OF FOLIC ACID SERUM: CPT

## 2024-09-27 LAB — PATH REV BLD -IMP: NORMAL

## 2024-10-08 ENCOUNTER — HOSPITAL ENCOUNTER (OUTPATIENT)
Age: 44
Discharge: HOME | End: 2024-10-08
Payer: COMMERCIAL

## 2024-10-08 DIAGNOSIS — E55.9 VITAMIN D DEFICIENCY, UNSPECIFIED: ICD-10-CM

## 2024-10-08 DIAGNOSIS — G35 MULTIPLE SCLEROSIS (HCC): ICD-10-CM

## 2024-10-08 LAB — 25(OH)D3 SERPL-MCNC: 29.1 NG/ML (ref 30–100)

## 2024-10-08 PROCEDURE — 36415 COLL VENOUS BLD VENIPUNCTURE: CPT

## 2024-10-08 PROCEDURE — 82306 VITAMIN D 25 HYDROXY: CPT

## 2024-10-24 ENCOUNTER — TELEPHONE (OUTPATIENT)
Dept: FAMILY MEDICINE CLINIC | Age: 44
End: 2024-10-24

## 2024-10-24 DIAGNOSIS — G35 MULTIPLE SCLEROSIS (HCC): ICD-10-CM

## 2024-10-24 DIAGNOSIS — F32.1 CURRENT MODERATE EPISODE OF MAJOR DEPRESSIVE DISORDER WITHOUT PRIOR EPISODE (HCC): ICD-10-CM

## 2024-10-24 RX ORDER — SERTRALINE HYDROCHLORIDE 25 MG/1
25 TABLET, FILM COATED ORAL DAILY
Qty: 30 TABLET | Refills: 0 | Status: SHIPPED | OUTPATIENT
Start: 2024-10-24

## 2024-10-24 RX ORDER — DIMETHYL FUMARATE 240 MG/1
240 CAPSULE ORAL 2 TIMES DAILY
Qty: 180 CAPSULE | Refills: 0 | Status: SHIPPED | OUTPATIENT
Start: 2024-10-24 | End: 2025-01-22

## 2024-10-24 NOTE — TELEPHONE ENCOUNTER
Pt request refills on Zoloft and Dimethyl Fumarate.  Pt sent [employer] message to schedule follow up appt.

## 2024-10-28 ENCOUNTER — OFFICE VISIT (OUTPATIENT)
Dept: FAMILY MEDICINE CLINIC | Age: 44
End: 2024-10-28

## 2024-10-28 VITALS
BODY MASS INDEX: 24.17 KG/M2 | OXYGEN SATURATION: 100 % | WEIGHT: 154 LBS | TEMPERATURE: 98.1 F | HEIGHT: 67 IN | RESPIRATION RATE: 16 BRPM | DIASTOLIC BLOOD PRESSURE: 62 MMHG | SYSTOLIC BLOOD PRESSURE: 100 MMHG | HEART RATE: 75 BPM

## 2024-10-28 DIAGNOSIS — F32.1 CURRENT MODERATE EPISODE OF MAJOR DEPRESSIVE DISORDER WITHOUT PRIOR EPISODE (HCC): ICD-10-CM

## 2024-10-28 DIAGNOSIS — G35 MULTIPLE SCLEROSIS (HCC): ICD-10-CM

## 2024-10-28 DIAGNOSIS — Z23 FLU VACCINE NEED: Primary | ICD-10-CM

## 2024-10-28 RX ORDER — SERTRALINE HYDROCHLORIDE 25 MG/1
25 TABLET, FILM COATED ORAL DAILY
Qty: 30 TABLET | Refills: 0 | Status: SHIPPED | OUTPATIENT
Start: 2024-10-28

## 2024-10-28 NOTE — PROGRESS NOTES
[facility]  FAMILY MEDICINE RESIDENCY PROGRAM  DATE OF VISIT : 10/29/2024    Patient : Lay Pressley   Age : 44 y.o.    : 1980   MRN : [id number]   ______________________________________________________________________    Chief Complaint:   Chief Complaint   Patient presents with    Medication Refill    Multiple Sclerosis     Follow-up    Depression       HPI:   Lay Pressley is a 44 y.o. female who presents for f/u MS. The patient was started on Tecfidera Aug 2024 after being off meds for MS since . Pt saw neuro  who recommended continuation of Tecfidera w/ repeat imaging q6 months and escalation to Ocrevus if there is evidence of disease advancement. Neuro also ordered MRI c-spine/T-spine which have not yet been obtained. She denies development of new symptoms.  Reports persistent numbness across her abdomen but states that it has not progressed.    Patient is also taking Zoloft 25 mg for depression.  She states that is working well for her.  No SI/HI.  States that her mood feels more stable.    Past Medical History:  Past Medical History:   Diagnosis Date    Chronic back pain     Delayed growth and development     Dysuria 10/30/2012    Heel spur     Right    Multiple sclerosis (HCC) 2022    Patient overweight 2011    Updating deleted diagnoses      STD (female)     unknown as per pt; at Dr Ochoa    Suicide attempt ([facility]) 2022       Allergies:   No Known Allergies    Medications:    Current Outpatient Medications   Medication Sig Dispense Refill    vitamin D (CHOLECALCIFEROL) 25 MCG (1000 UT) TABS tablet Take 1 tablet by mouth daily 90 tablet 1    sertraline (ZOLOFT) 25 MG tablet Take 1 tablet by mouth daily 30 tablet 0    dimethyl fumarate (TECFIDERA) 240 MG delayed release capsule Take 1 capsule by mouth 2 times daily 180 capsule 0     No current facility-administered medications for this visit.

## 2024-10-28 NOTE — PROGRESS NOTES
S: 44 y.o. female here for MS. Restarted tecfidera. MRI cspine pending. No progression of vision issues since last OV.   MDD. Zoloft. Controlled.     O: VS: /62   Pulse 75   Temp 98.1 °F (36.7 °C) (Temporal)   Resp 16   Ht 1.702 m (5' 7\")   Wt 69.9 kg (154 lb)   SpO2 100%   BMI 24.12 kg/m²    General: NAD, alert and interacting appropriately.    CV:  RRR, no gallops, rubs, or murmurs    Resp: CTAB   Abd:  Soft, nontender. Periumbilical numbness.    Ext:  No edema. 5/5 diffuse motor.     Impression: MS. MDD.   Plan:   CPM. MRI cspine  Cont zoloft.       Attending Physician Statement  I have discussed the case, including pertinent history and exam findings with the resident.  I agree with the documented assessment and plan.

## 2024-11-27 DIAGNOSIS — F32.1 CURRENT MODERATE EPISODE OF MAJOR DEPRESSIVE DISORDER WITHOUT PRIOR EPISODE (HCC): ICD-10-CM

## 2024-11-27 RX ORDER — SERTRALINE HYDROCHLORIDE 25 MG/1
25 TABLET, FILM COATED ORAL DAILY
Qty: 90 TABLET | Refills: 0 | Status: SHIPPED | OUTPATIENT
Start: 2024-11-27 | End: 2025-02-25

## 2024-12-10 ENCOUNTER — HOSPITAL ENCOUNTER (OUTPATIENT)
Dept: GENERAL RADIOLOGY | Age: 44
Discharge: HOME | End: 2024-12-12
Attending: PSYCHIATRY & NEUROLOGY
Payer: COMMERCIAL

## 2024-12-10 ENCOUNTER — HOSPITAL ENCOUNTER (OUTPATIENT)
Dept: MRI IMAGING | Age: 44
Discharge: HOME | End: 2024-12-12
Attending: PSYCHIATRY & NEUROLOGY
Payer: COMMERCIAL

## 2024-12-10 ENCOUNTER — HOSPITAL ENCOUNTER (OUTPATIENT)
Age: 44
Discharge: HOME | End: 2024-12-12
Attending: PSYCHIATRY & NEUROLOGY
Payer: COMMERCIAL

## 2024-12-10 ENCOUNTER — HOSPITAL ENCOUNTER (OUTPATIENT)
Age: 44
Discharge: HOME | End: 2024-12-10
Attending: PSYCHIATRY & NEUROLOGY
Payer: COMMERCIAL

## 2024-12-10 DIAGNOSIS — G35 MULTIPLE SCLEROSIS (HCC): ICD-10-CM

## 2024-12-10 DIAGNOSIS — M79.672 LEFT FOOT PAIN: ICD-10-CM

## 2024-12-10 LAB — 25(OH)D3 SERPL-MCNC: 36.7 NG/ML (ref 30–100)

## 2024-12-10 PROCEDURE — 72146 MRI CHEST SPINE W/O DYE: CPT

## 2024-12-10 PROCEDURE — 73630 X-RAY EXAM OF FOOT: CPT

## 2024-12-10 PROCEDURE — 72141 MRI NECK SPINE W/O DYE: CPT

## 2024-12-10 PROCEDURE — 36415 COLL VENOUS BLD VENIPUNCTURE: CPT

## 2024-12-10 PROCEDURE — 82306 VITAMIN D 25 HYDROXY: CPT

## 2025-01-27 SDOH — ECONOMIC STABILITY: TRANSPORTATION INSECURITY
IN THE PAST 12 MONTHS, HAS THE LACK OF TRANSPORTATION KEPT YOU FROM MEDICAL APPOINTMENTS OR FROM GETTING MEDICATIONS?: NO

## 2025-01-27 SDOH — ECONOMIC STABILITY: TRANSPORTATION INSECURITY
IN THE PAST 12 MONTHS, HAS LACK OF TRANSPORTATION KEPT YOU FROM MEETINGS, WORK, OR FROM GETTING THINGS NEEDED FOR DAILY LIVING?: NO

## 2025-01-27 SDOH — ECONOMIC STABILITY: FOOD INSECURITY: WITHIN THE PAST 12 MONTHS, YOU WORRIED THAT YOUR FOOD WOULD RUN OUT BEFORE YOU GOT MONEY TO BUY MORE.: NEVER TRUE

## 2025-01-27 SDOH — ECONOMIC STABILITY: INCOME INSECURITY: IN THE LAST 12 MONTHS, WAS THERE A TIME WHEN YOU WERE NOT ABLE TO PAY THE MORTGAGE OR RENT ON TIME?: NO

## 2025-01-27 SDOH — ECONOMIC STABILITY: FOOD INSECURITY: WITHIN THE PAST 12 MONTHS, THE FOOD YOU BOUGHT JUST DIDN'T LAST AND YOU DIDN'T HAVE MONEY TO GET MORE.: NEVER TRUE

## 2025-01-27 ASSESSMENT — PATIENT HEALTH QUESTIONNAIRE - PHQ9
9. THOUGHTS THAT YOU WOULD BE BETTER OFF DEAD, OR OF HURTING YOURSELF: NOT AT ALL
SUM OF ALL RESPONSES TO PHQ9 QUESTIONS 1 & 2: 2
SUM OF ALL RESPONSES TO PHQ QUESTIONS 1-9: 5
9. THOUGHTS THAT YOU WOULD BE BETTER OFF DEAD, OR OF HURTING YOURSELF: NOT AT ALL
10. IF YOU CHECKED OFF ANY PROBLEMS, HOW DIFFICULT HAVE THESE PROBLEMS MADE IT FOR YOU TO DO YOUR WORK, TAKE CARE OF THINGS AT HOME, OR GET ALONG WITH OTHER PEOPLE: NOT DIFFICULT AT ALL
7. TROUBLE CONCENTRATING ON THINGS, SUCH AS READING THE NEWSPAPER OR WATCHING TELEVISION: NOT AT ALL
6. FEELING BAD ABOUT YOURSELF - OR THAT YOU ARE A FAILURE OR HAVE LET YOURSELF OR YOUR FAMILY DOWN: NOT AT ALL
10. IF YOU CHECKED OFF ANY PROBLEMS, HOW DIFFICULT HAVE THESE PROBLEMS MADE IT FOR YOU TO DO YOUR WORK, TAKE CARE OF THINGS AT HOME, OR GET ALONG WITH OTHER PEOPLE: NOT DIFFICULT AT ALL
6. FEELING BAD ABOUT YOURSELF - OR THAT YOU ARE A FAILURE OR HAVE LET YOURSELF OR YOUR FAMILY DOWN: NOT AT ALL
4. FEELING TIRED OR HAVING LITTLE ENERGY: NEARLY EVERY DAY
5. POOR APPETITE OR OVEREATING: NOT AT ALL
2. FEELING DOWN, DEPRESSED OR HOPELESS: NOT AT ALL
3. TROUBLE FALLING OR STAYING ASLEEP: NOT AT ALL
7. TROUBLE CONCENTRATING ON THINGS, SUCH AS READING THE NEWSPAPER OR WATCHING TELEVISION: NOT AT ALL
SUM OF ALL RESPONSES TO PHQ QUESTIONS 1-9: 5
1. LITTLE INTEREST OR PLEASURE IN DOING THINGS: MORE THAN HALF THE DAYS
SUM OF ALL RESPONSES TO PHQ QUESTIONS 1-9: 5
4. FEELING TIRED OR HAVING LITTLE ENERGY: NEARLY EVERY DAY
8. MOVING OR SPEAKING SO SLOWLY THAT OTHER PEOPLE COULD HAVE NOTICED. OR THE OPPOSITE, BEING SO FIGETY OR RESTLESS THAT YOU HAVE BEEN MOVING AROUND A LOT MORE THAN USUAL: NOT AT ALL
1. LITTLE INTEREST OR PLEASURE IN DOING THINGS: MORE THAN HALF THE DAYS
2. FEELING DOWN, DEPRESSED OR HOPELESS: NOT AT ALL
5. POOR APPETITE OR OVEREATING: NOT AT ALL
8. MOVING OR SPEAKING SO SLOWLY THAT OTHER PEOPLE COULD HAVE NOTICED. OR THE OPPOSITE - BEING SO FIDGETY OR RESTLESS THAT YOU HAVE BEEN MOVING AROUND A LOT MORE THAN USUAL: NOT AT ALL
3. TROUBLE FALLING OR STAYING ASLEEP: NOT AT ALL

## 2025-01-28 ENCOUNTER — TELEPHONE (OUTPATIENT)
Dept: FAMILY MEDICINE CLINIC | Age: 45
End: 2025-01-28

## 2025-01-28 DIAGNOSIS — G35 MULTIPLE SCLEROSIS (HCC): ICD-10-CM

## 2025-01-28 RX ORDER — DIMETHYL FUMARATE 240 MG/1
240 CAPSULE ORAL 2 TIMES DAILY
Qty: 180 CAPSULE | Refills: 0 | Status: SHIPPED | OUTPATIENT
Start: 2025-01-28 | End: 2025-04-28

## 2025-01-30 ENCOUNTER — OFFICE VISIT (OUTPATIENT)
Dept: FAMILY MEDICINE CLINIC | Age: 45
End: 2025-01-30

## 2025-01-30 VITALS
DIASTOLIC BLOOD PRESSURE: 70 MMHG | SYSTOLIC BLOOD PRESSURE: 108 MMHG | WEIGHT: 158 LBS | TEMPERATURE: 97.7 F | OXYGEN SATURATION: 99 % | HEART RATE: 88 BPM | BODY MASS INDEX: 24.8 KG/M2 | RESPIRATION RATE: 18 BRPM | HEIGHT: 67 IN

## 2025-01-30 DIAGNOSIS — G35 MULTIPLE SCLEROSIS (HCC): Primary | ICD-10-CM

## 2025-01-30 DIAGNOSIS — G35 MULTIPLE SCLEROSIS (HCC): ICD-10-CM

## 2025-01-30 DIAGNOSIS — F32.1 CURRENT MODERATE EPISODE OF MAJOR DEPRESSIVE DISORDER WITHOUT PRIOR EPISODE (HCC): ICD-10-CM

## 2025-01-30 LAB
ALBUMIN: 4.2 G/DL (ref 3.5–5.2)
ALP BLD-CCNC: 57 U/L (ref 35–104)
ALT SERPL-CCNC: 12 U/L (ref 0–32)
ANION GAP SERPL CALCULATED.3IONS-SCNC: 11 MMOL/L (ref 7–16)
AST SERPL-CCNC: 22 U/L (ref 0–31)
BASOPHILS ABSOLUTE: 0.07 K/UL (ref 0–0.2)
BASOPHILS RELATIVE PERCENT: 1 % (ref 0–2)
BILIRUB SERPL-MCNC: 0.2 MG/DL (ref 0–1.2)
BUN BLDV-MCNC: 12 MG/DL (ref 6–20)
CALCIUM SERPL-MCNC: 8.8 MG/DL (ref 8.6–10.2)
CHLORIDE BLD-SCNC: 100 MMOL/L (ref 98–107)
CO2: 25 MMOL/L (ref 22–29)
CREAT SERPL-MCNC: 0.7 MG/DL (ref 0.5–1)
EOSINOPHILS ABSOLUTE: 0.11 K/UL (ref 0.05–0.5)
EOSINOPHILS RELATIVE PERCENT: 1 % (ref 0–6)
GFR, ESTIMATED: >90 ML/MIN/1.73M2
GLUCOSE BLD-MCNC: 66 MG/DL (ref 74–99)
HCT VFR BLD CALC: 38.9 % (ref 34–48)
HEMOGLOBIN: 12.6 G/DL (ref 11.5–15.5)
IMMATURE GRANULOCYTES %: 0 % (ref 0–5)
IMMATURE GRANULOCYTES ABSOLUTE: 0.03 K/UL (ref 0–0.58)
LYMPHOCYTES ABSOLUTE: 1.17 K/UL (ref 1.5–4)
LYMPHOCYTES RELATIVE PERCENT: 14 % (ref 20–42)
MCH RBC QN AUTO: 32.5 PG (ref 26–35)
MCHC RBC AUTO-ENTMCNC: 32.4 G/DL (ref 32–34.5)
MCV RBC AUTO: 100.3 FL (ref 80–99.9)
MONOCYTES ABSOLUTE: 0.68 K/UL (ref 0.1–0.95)
MONOCYTES RELATIVE PERCENT: 8 % (ref 2–12)
NEUTROPHILS ABSOLUTE: 6.45 K/UL (ref 1.8–7.3)
NEUTROPHILS RELATIVE PERCENT: 76 % (ref 43–80)
PDW BLD-RTO: 13.2 % (ref 11.5–15)
PLATELET # BLD: 398 K/UL (ref 130–450)
PMV BLD AUTO: 9.6 FL (ref 7–12)
POTASSIUM SERPL-SCNC: 3.6 MMOL/L (ref 3.5–5)
RBC # BLD: 3.88 M/UL (ref 3.5–5.5)
SODIUM BLD-SCNC: 136 MMOL/L (ref 132–146)
TOTAL PROTEIN: 7 G/DL (ref 6.4–8.3)
WBC # BLD: 8.5 K/UL (ref 4.5–11.5)

## 2025-01-30 RX ORDER — DIMETHYL FUMARATE 240 MG/1
240 CAPSULE ORAL 2 TIMES DAILY
Qty: 180 CAPSULE | Refills: 0 | OUTPATIENT
Start: 2025-01-30

## 2025-01-30 RX ORDER — SERTRALINE HYDROCHLORIDE 25 MG/1
25 TABLET, FILM COATED ORAL DAILY
Qty: 90 TABLET | Refills: 1 | Status: SHIPPED | OUTPATIENT
Start: 2025-01-30 | End: 2025-07-29

## 2025-01-30 SDOH — ECONOMIC STABILITY: FOOD INSECURITY: WITHIN THE PAST 12 MONTHS, YOU WORRIED THAT YOUR FOOD WOULD RUN OUT BEFORE YOU GOT MONEY TO BUY MORE.: NEVER TRUE

## 2025-01-30 SDOH — ECONOMIC STABILITY: FOOD INSECURITY: WITHIN THE PAST 12 MONTHS, THE FOOD YOU BOUGHT JUST DIDN'T LAST AND YOU DIDN'T HAVE MONEY TO GET MORE.: NEVER TRUE

## 2025-01-30 NOTE — PROGRESS NOTES
[facility]  FAMILY MEDICINE RESIDENCY PROGRAM  DATE OF VISIT : 2025    Patient : Lay Pressley   Age : 44 y.o.    : 1980   MRN : [id number]   ______________________________________________________________________    Chief Complaint:   Chief Complaint   Patient presents with    3 Month Follow-Up     MS no falls        HPI:   Lay Pressley is a 44 y.o. female who presents for f/u multiple sclerosis.     MS- established with neurology, last seen 2024. Currently on tecfidera, tx which worked well for her in the past. Had prev been off meds since . Recommended by neuro for imaging q6 mos and if there is progression of disease to escalate to Ocrevus. Reported worsening sxs across her abdomen at her last appt 10/28/2024. She was previously having frequent falls, but she states that she has been improved since being restarted on her Tecfidera. She states that she has worsening fatigue but otherwise she has no worsening weakness. Reports blurry vision but states this was corrected with glasses.     Major depressive disorder- on zoloft 25 mg.   PHQ-9 Total Score: 5 (2025  8:59 AM)  Thoughts that you would be better off dead, or of hurting yourself in some way: 0 (2025  8:59 AM)       Past Medical History:  Past Medical History:   Diagnosis Date    Chronic back pain     Delayed growth and development     Dysuria 10/30/2012    Heel spur     Right    Multiple sclerosis (HCC) 2022    Patient overweight 2011    Updating deleted diagnoses      STD (female)     unknown as per pt; at Dr Ochoa    Suicide attempt ([facility]) 2022       Allergies:   No Known Allergies    Medications:    Current Outpatient Medications   Medication Sig Dispense Refill    sertraline (ZOLOFT) 25 MG tablet Take 1 tablet by mouth daily 90 tablet 1    dimethyl fumarate (TECFIDERA) 240 MG delayed release capsule Take 1 capsule by mouth 2 times daily 180 capsule 0    aspirin 81 MG EC tablet

## 2025-01-30 NOTE — TELEPHONE ENCOUNTER
Name of Medication(s) Requested:  Requested Prescriptions     Pending Prescriptions Disp Refills    dimethyl fumarate (TECFIDERA) 240 MG delayed release capsule [Pharmacy Med Name: DIMETHYL FUMARATE 240MG DR CAPSULES] 180 capsule 0     Sig: TAKE 1 CAPSULE BY MOUTH TWICE DAILY       Medication is on current medication list Yes    Dosage and directions were verified? Yes    Quantity verified: 90 day supply     Pharmacy Verified?  Yes    Last Appointment:  1/30/2025    Future appts:  Future Appointments   Date Time Provider Department Center   3/24/2025 11:00 AM Wil Tejeda DO [facility] Neu[facility] -   4/30/2025  2:45 PM Cuate Noguera MD [facility]        (If no appt send self scheduling link. .REFILLAPPT)  Scheduling request sent?     [] Yes  [x] No    Does patient need updated?  [] Yes  [x] No

## 2025-01-30 NOTE — PROGRESS NOTES
Patient is a 44-year-old female with a past medical history of multiple sclerosis and major depressive disorder who comes today for follow-up of these conditions.    MS-established with neurology, last seen 9/30/2024.  Patient is currently on Tecfidera, treatment which is worked well for her in the past.  She was previously off of her medication since 2022, restarted late 2024.  Recommended by neuro to obtain imaging every 6 months and if there is progression would to escalate to Ocrevus.  The patient states that her symptoms are stable.  Prior to starting the Tecfidera, she had been reporting worsening falls and numbness across her abdomen.  She states that her falls have improved and the numbness on her abdomen has not progressed in any way.  Denies worsening weakness, lightheadedness, dizziness, blurry vision, bowel or bladder incontinence, and saddle anesthesia.    Major depressive disorder-PHQ-9 is 5, denies SI/HI.  Patient is on Zoloft 25 mg.  Tolerating it well requesting refill today.    Blood pressure 108/70, pulse 88, temperature 97.7 °F (36.5 °C), resp. rate 18, height 1.702 m (5' 7\"), weight 71.7 kg (158 lb), SpO2 99%, not currently breastfeeding.    HEENT WNL     Heart regular    Lungs clear    abd non-tender      No edema    Pulses intact neurologic exam showed no focal deficit    MS-refill Tecfidera, check CBC/CMP for medication monitoring, advised follow-up with neurology in March as scheduled.  Major depressive disorder-continue present management    Attending Physician Statement  I have discussed the case, including pertinent history and exam findings with the resident. I agree with the documented assessment and plan.

## 2025-01-31 DIAGNOSIS — D75.89 MACROCYTOSIS: Primary | ICD-10-CM

## 2025-01-31 RX ORDER — LANOLIN ALCOHOL/MO/W.PET/CERES
1000 CREAM (GRAM) TOPICAL DAILY
Qty: 30 TABLET | Refills: 3 | Status: SHIPPED | OUTPATIENT
Start: 2025-01-31

## 2025-01-31 RX ORDER — FOLIC ACID 1 MG/1
1 TABLET ORAL DAILY
Qty: 90 TABLET | Refills: 1 | Status: SHIPPED | OUTPATIENT
Start: 2025-01-31

## 2025-03-26 ENCOUNTER — TELEPHONE (OUTPATIENT)
Dept: FAMILY MEDICINE CLINIC | Age: 45
End: 2025-03-26

## 2025-03-26 NOTE — TELEPHONE ENCOUNTER
Lay called in and she is having trouble getting in touch with Neurology.  She states it happens a lot.  She would like for you to recommend and refer her over to a new neurology office.    Please advise      Thank you

## 2025-03-26 NOTE — TELEPHONE ENCOUNTER
Called to discuss with patient. Advised that next closest neurologist is in [place]. Patient declines to drive that far, states she will stay at current clinic. Also stated that she is no longer taking her B12, folate, and vitamin D. I encouraged compliance of these meds and encouraged her to keep her appt with neuro 3/28/25. Patient verbalized understanding and is agreeable to plan.

## 2025-04-08 DIAGNOSIS — G35 MULTIPLE SCLEROSIS (HCC): ICD-10-CM

## 2025-04-08 RX ORDER — DIMETHYL FUMARATE 240 MG/1
240 CAPSULE ORAL 2 TIMES DAILY
Qty: 180 CAPSULE | Refills: 0 | Status: SHIPPED | OUTPATIENT
Start: 2025-04-08 | End: 2025-07-07

## 2025-04-10 ENCOUNTER — HOSPITAL ENCOUNTER (OUTPATIENT)
Dept: MAMMOGRAPHY | Age: 45
Discharge: HOME | End: 2025-04-12
Attending: INTERNAL MEDICINE

## 2025-04-10 DIAGNOSIS — Z12.31 ENCOUNTER FOR SCREENING MAMMOGRAM FOR MALIGNANT NEOPLASM OF BREAST: ICD-10-CM
